# Patient Record
Sex: MALE | Race: BLACK OR AFRICAN AMERICAN | NOT HISPANIC OR LATINO | Employment: FULL TIME | URBAN - METROPOLITAN AREA
[De-identification: names, ages, dates, MRNs, and addresses within clinical notes are randomized per-mention and may not be internally consistent; named-entity substitution may affect disease eponyms.]

---

## 2014-11-20 LAB — CRC RECOMMENDATION EXT: NORMAL

## 2017-02-06 ENCOUNTER — OFFICE VISIT (OUTPATIENT)
Dept: OPHTHALMOLOGY | Facility: CLINIC | Age: 57
End: 2017-02-06
Payer: COMMERCIAL

## 2017-02-06 DIAGNOSIS — L02.01 FACIAL ABSCESS: ICD-10-CM

## 2017-02-06 PROCEDURE — 87077 CULTURE AEROBIC IDENTIFY: CPT

## 2017-02-06 PROCEDURE — 10060 I&D ABSCESS SIMPLE/SINGLE: CPT | Mod: S$GLB,,, | Performed by: OPHTHALMOLOGY

## 2017-02-06 PROCEDURE — 87070 CULTURE OTHR SPECIMN AEROBIC: CPT

## 2017-02-06 PROCEDURE — 87186 SC STD MICRODIL/AGAR DIL: CPT

## 2017-02-06 PROCEDURE — 92012 INTRM OPH EXAM EST PATIENT: CPT | Mod: 25,S$GLB,, | Performed by: OPHTHALMOLOGY

## 2017-02-06 PROCEDURE — 99999 PR PBB SHADOW E&M-EST. PATIENT-LVL I: CPT | Mod: PBBFAC,,, | Performed by: OPHTHALMOLOGY

## 2017-02-06 RX ORDER — SULFAMETHOXAZOLE AND TRIMETHOPRIM 800; 160 MG/1; MG/1
1 TABLET ORAL 2 TIMES DAILY
Qty: 20 TABLET | Refills: 2 | Status: SHIPPED | OUTPATIENT
Start: 2017-02-06 | End: 2017-02-16

## 2017-02-06 RX ORDER — RIFAMPIN 300 MG/1
300 CAPSULE ORAL EVERY 12 HOURS
Qty: 20 CAPSULE | Refills: 3 | Status: SHIPPED | OUTPATIENT
Start: 2017-02-06 | End: 2017-02-16

## 2017-02-06 NOTE — PROGRESS NOTES
"SUBJECTIVE:   Bradley Petty Jr. is a 56 y.o. male   Uncorrected distance visual acuity was 20/30 -1 in the right eye and 20/30 in the left eye.   Chief Complaint   Patient presents with    Eye Problem        HPI:  HPI     OD temporal area red, swollen,  since 2-4-17, had tried to pop a pimple.  Pain Scale 7    [facility]    1. COAG Suspect (?FHx vs suspect mother) variable IOP's 18-25  Borderline cups and GOCT- very long dscussion and pt opts to wait  and observe and do SLT if any changes  2. Myopic Astigmatism  ___________________________________________  Last visit 2/18/13 with Dr. Tobin, but canceled SLT  COAG  History of non compliance  /505 (+3)  Gonio OU normal  VF 10/9/12 OD bjerrum, OS "ok"  Tmax 25/26 post serial tonometry  Increased c:d .7/.8  +family history of glaucoma  Primary Sjogren's syndrome        Last edited by Nish Tobin MD on 2/6/2017  5:27 PM.     Assessment /Plan :  1. Facial abscess   PROCEDURE NOTE: INCISION OF Dacrocystitis Abscess     56 y.o.  symptomatic patient with non-resolving abscess of right malar eminence    PROCEDURE: After a thorough explanation of the risks, benefits and alternatives to  Incision and drainage facial abscess including scarring, pain, infection, change in vision, and likelihood of need for additional surgery with frequent recurrences the patient signed the consent. Ak-taine solution was placed in both eyes and EMLA applied to area. A 30 gauge needle on a Tb syringe was used to inject 5 cc of lidocaine with epi. An #11 blade was used to I and D the abscess with seropurulent discharge presenting and sample collect with a culturette. A curette was used to express the discharge along with digital compression. Pt tolerated the procedure well.    IMPRESSION: Incision and Drainage of Facial abscess  well tolerated    PLAN:  1. Continue oral antibiotics bactrim ds and rifampin  2. Warm compresses and antibiotic oint   3. RTC prn  4. " Culture sent

## 2017-02-09 LAB — BACTERIA SPEC AEROBE CULT: NORMAL

## 2017-02-20 ENCOUNTER — NURSE TRIAGE (OUTPATIENT)
Dept: ADMINISTRATIVE | Facility: CLINIC | Age: 57
End: 2017-02-20

## 2017-02-20 ENCOUNTER — TELEPHONE (OUTPATIENT)
Dept: OPHTHALMOLOGY | Facility: CLINIC | Age: 57
End: 2017-02-20

## 2017-02-20 NOTE — TELEPHONE ENCOUNTER
----- Message from Gema Bullard sent at 2/20/2017  7:18 AM CST -----  Contact: Patient  Patient called to speak with the nurse; he stated he was on antibiotics. He did take them all but he went and got the refill. He wants to know if he was supposed to take another round of the antibiotics. Please call him at [phone].    Thanks,  Gema

## 2017-02-20 NOTE — TELEPHONE ENCOUNTER
Called pt and told him Dr. Tobin said that when he feels he is done with it, take it for 3 more days.

## 2017-02-20 NOTE — TELEPHONE ENCOUNTER
Reason for Disposition   Caller has NON-URGENT medication question about med that PCP prescribed and triager unable to answer question    Protocols used: ST MEDICATION QUESTION CALL-A-AH    Bradley is calling to report he finished first round of antibiotics and did refill them.  Bradley is not sure if he was supposed to refill and continue to take them.  Also reports some nausea with antibiotics.  Please contact Bradley to advise.

## 2017-02-21 ENCOUNTER — OFFICE VISIT (OUTPATIENT)
Dept: INTERNAL MEDICINE | Facility: CLINIC | Age: 57
End: 2017-02-21
Payer: COMMERCIAL

## 2017-02-21 ENCOUNTER — HOSPITAL ENCOUNTER (OUTPATIENT)
Dept: RADIOLOGY | Facility: HOSPITAL | Age: 57
Discharge: HOME | End: 2017-02-21
Attending: INTERNAL MEDICINE
Payer: COMMERCIAL

## 2017-02-21 ENCOUNTER — TELEPHONE (OUTPATIENT)
Dept: INTERNAL MEDICINE | Facility: CLINIC | Age: 57
End: 2017-02-21

## 2017-02-21 VITALS
HEIGHT: 72 IN | DIASTOLIC BLOOD PRESSURE: 80 MMHG | HEART RATE: 97 BPM | WEIGHT: 217.63 LBS | BODY MASS INDEX: 29.48 KG/M2 | TEMPERATURE: 97 F | OXYGEN SATURATION: 98 % | SYSTOLIC BLOOD PRESSURE: 112 MMHG

## 2017-02-21 DIAGNOSIS — D70.9 SEVERE NEUTROPENIA: Primary | ICD-10-CM

## 2017-02-21 DIAGNOSIS — R53.83 FATIGUE, UNSPECIFIED TYPE: ICD-10-CM

## 2017-02-21 DIAGNOSIS — L02.01 FACIAL ABSCESS: ICD-10-CM

## 2017-02-21 DIAGNOSIS — R06.00 DYSPNEA, UNSPECIFIED TYPE: ICD-10-CM

## 2017-02-21 DIAGNOSIS — R11.0 NAUSEA: Primary | ICD-10-CM

## 2017-02-21 PROCEDURE — 99214 OFFICE O/P EST MOD 30 MIN: CPT | Mod: S$GLB,,, | Performed by: INTERNAL MEDICINE

## 2017-02-21 PROCEDURE — 71020 XR CHEST PA AND LATERAL: CPT | Mod: TC,PO

## 2017-02-21 PROCEDURE — 3074F SYST BP LT 130 MM HG: CPT | Mod: S$GLB,,, | Performed by: INTERNAL MEDICINE

## 2017-02-21 PROCEDURE — 3079F DIAST BP 80-89 MM HG: CPT | Mod: S$GLB,,, | Performed by: INTERNAL MEDICINE

## 2017-02-21 PROCEDURE — 1160F RVW MEDS BY RX/DR IN RCRD: CPT | Mod: S$GLB,,, | Performed by: INTERNAL MEDICINE

## 2017-02-21 PROCEDURE — 99999 PR PBB SHADOW E&M-EST. PATIENT-LVL IV: CPT | Mod: PBBFAC,,, | Performed by: INTERNAL MEDICINE

## 2017-02-21 PROCEDURE — 71020 XR CHEST PA AND LATERAL: CPT | Mod: 26,,, | Performed by: RADIOLOGY

## 2017-02-21 RX ORDER — ONDANSETRON 8 MG/1
8 TABLET, ORALLY DISINTEGRATING ORAL EVERY 8 HOURS
Qty: 30 TABLET | Refills: 0 | Status: SHIPPED | OUTPATIENT
Start: 2017-02-21 | End: 2018-06-26

## 2017-02-21 NOTE — MR AVS SNAPSHOT
[facility]  [address] [place] [address]  Phone: [phone]  Fax: [phone]                  Bradley Petty JrYue   2017 10:40 AM   Office Visit    Description:  Male : 1960   Provider:  Jesse Barnes MD   Department:  [facility] Medicine           Reason for Visit     Weakness     Chills           Diagnoses this Visit        Comments    Nausea    -  Primary     Fatigue, unspecified type         Dyspnea, unspecified type         Facial abscess                To Do List           Future Appointments        Provider Department Dept Phone    2017 12:00 PM LAB, SAME DAY [facility] [phone]    2017 12:30 PM [facility] XR2 [facility] [phone]    2017 11:00 AM Jesse Barnes MD [facility] [phone]      Goals (5 Years of Data)     None      Follow-Up and Disposition     Return in about 3 months (around 2017), or if symptoms worsen or fail to improve.       These Medications        Disp Refills Start End    ondansetron (ZOFRAN-ODT) 8 MG TbDL 30 tablet 0 2017     Take 1 tablet (8 mg total) by mouth every 8 (eight) hours. Prn n/v - Oral    Pharmacy: [facility]/pharmacy #[id number] - [place] - [address] Ph #: [phone]         [facility]     [facility] On [facility] Nurse Care Line -  Assistance  Registered nurses in the [facility] Center provide clinical advisement, health education, appointment booking, and other advisory services.  Call for this free service at [phone].             Medications           Message regarding Medications     Verify the changes and/or additions to your medication regime listed below are the same as discussed with your clinician today.  If any of these changes or additions are incorrect, please notify your healthcare provider.        START taking these NEW medications        Refills     ondansetron (ZOFRAN-ODT) 8 MG TbDL 0    Sig: Take 1 tablet (8 mg total) by mouth every 8 (eight) hours. Prn n/v    Class: Normal    Route: Oral      STOP taking these medications     doxycycline (MONODOX) 100 MG capsule TAKE 1 CAPSULE ONCE DAILY WITH FOOD, MAY CAUSE UPSET STOMACH           Verify that the below list of medications is an accurate representation of the medications you are currently taking.  If none reported, the list may be blank. If incorrect, please contact your healthcare provider. Carry this list with you in case of emergency.           Current Medications     acetaminophen (TYLENOL) 500 mg Cap Take 1,000 mg by mouth once daily.    adapalene-benzoyl peroxide (EPIDUO) 0.1-2.5 % topical gel Apply topically.    b complex vitamins (VITAMIN B COMPLEX) tablet Take 1 tablet by mouth Daily.    benzoyl peroxide (BENZAC W WASH) 10 % external wash Use once daily after shaving.    cholecalciferol, vitamin D3, 5,000 unit capsule Take 5,000 Units by mouth Daily.    CIALIS 20 mg Tab TAKE 1 TABLET BY MOUTH ONCE DAILY AS NEEDED    clobetasol 0.05% (TEMOVATE) 0.05 % Oint AAA hands bid prn    fish oil-omega-3 fatty acids 300-1,000 mg capsule Take 1 g by mouth once daily.    fluticasone (FLONASE) 50 mcg/actuation nasal spray 1 spray by Nasal route once daily.     HERBAL DRUGS (COLON HERBAL CLEANSER ORAL) Take by mouth once daily.     hydroquinone, bulk, 98 % Powd Please compound Hydroquinone 8% cream. Apply to affected area twice a day x 3 months then skip a month    multivitamin capsule Take 1 capsule by mouth Daily.    MYRBETRIQ 25 mg Tb24 25 mg once daily.     ondansetron (ZOFRAN-ODT) 8 MG TbDL Take 1 tablet (8 mg total) by mouth every 8 (eight) hours. Prn n/v    pantoprazole (PROTONIX) 40 MG tablet Take 40 mg by mouth.     vitamin E 1000 UNIT capsule Take 1,000 Units by mouth once daily.           Clinical Reference Information           Your Vitals Were     BP Pulse Temp Height Weight SpO2    112/80 (BP  Location: Right arm, Patient Position: Sitting) 97 97.2 °F (36.2 °C) (Tympanic) 6' (1.829 m) 98.7 kg (217 lb 9.5 oz) 98%    BMI                29.51 kg/m2          Blood Pressure          Most Recent Value    BP  112/80      Allergies as of 2/21/2017     No Known Drug Allergies      Immunizations Administered on Date of Encounter - 2/21/2017     None      Orders Placed During Today's Visit     Future Labs/Procedures Expected by Expires    CBC auto differential  2/21/2017 4/22/2018    Comprehensive metabolic panel  2/21/2017 2/21/2018    HIV-1 and HIV-2 antibodies  2/21/2017 4/22/2018    TSH  2/21/2017 4/22/2018    X-Ray Chest PA And Lateral  2/21/2017 2/21/2018      Language Assistance Services     ATTENTION: Language assistance services are available, free of charge. Please call [phone].      ATENCIÓN: Si habla español, tiene a sanders disposición servicios gratuitos de asistencia lingüística. Llame al [phone].     CHÚ Ý: N?u b?n nói Ti?ng Vi?t, có các d?ch v? h? tr? ngôn ng? mi?n phí dành cho b?n. G?i s? [phone].         [facility] - Internal Medicine complies with applicable Federal civil rights laws and does not discriminate on the basis of race, color, national origin, age, disability, or sex.

## 2017-02-22 ENCOUNTER — INITIAL CONSULT (OUTPATIENT)
Dept: HEMATOLOGY/ONCOLOGY | Facility: CLINIC | Age: 57
End: 2017-02-22
Payer: COMMERCIAL

## 2017-02-22 VITALS
HEART RATE: 100 BPM | OXYGEN SATURATION: 98 % | WEIGHT: 215.63 LBS | BODY MASS INDEX: 29.21 KG/M2 | SYSTOLIC BLOOD PRESSURE: 102 MMHG | HEIGHT: 72 IN | DIASTOLIC BLOOD PRESSURE: 70 MMHG | TEMPERATURE: 99 F

## 2017-02-22 DIAGNOSIS — D72.819 LEUKOCYTOPENIA, UNSPECIFIED: Primary | ICD-10-CM

## 2017-02-22 PROCEDURE — 99244 OFF/OP CNSLTJ NEW/EST MOD 40: CPT | Mod: S$GLB,,, | Performed by: INTERNAL MEDICINE

## 2017-02-22 PROCEDURE — 99999 PR PBB SHADOW E&M-EST. PATIENT-LVL III: CPT | Mod: PBBFAC,,, | Performed by: INTERNAL MEDICINE

## 2017-02-22 NOTE — TELEPHONE ENCOUNTER
I spoke to patient at approximately 7:20 PM on 2/21/17..  Explained finding of severe neutropenia to patient and potential for recent antibiotics to be the culprit.  Patient reported that he is feeling okay and thinks he is getting better.  Nausea medicine helped.  He again denied any fever, diarrhea, pain.  I explained to him that if he has any worsening of condition or any rising temperature above 99.5 or 100.0 he should present to the emergency room immediately for further evaluation.  Please contact the patient this morning with a hematology appointment for February 22, 2017.  Referral has been placed.  Please let me know if you have any difficulty scheduling same day appointment for patient.

## 2017-02-22 NOTE — TELEPHONE ENCOUNTER
Spoke to Dr. Frey's nurse, Gianna, she stated pt can be worked in today at 1 pm. Notified pt that Dr. Frey has an opening today at 1 pm and he is on the 3rd floor at [facility]. Pt verbalized understanding and stated he will be there.

## 2017-02-22 NOTE — LETTER
February 24, 2017      Jesse Barnes MD  [address] [place] [address]           [facility]ot[facility]  [address] [place] [address]  Phone: [phone]  Fax: [phone]          Patient: Bradley Petty Jr.   MR Number: [id number]   YOB: 1960   Date of Visit: 2/22/2017       Dear Dr. Jesse Barnes:    Thank you for referring Bradely Petty to me for evaluation. Attached you will find relevant portions of my assessment and plan of care.    If you have questions, please do not hesitate to call me. I look forward to following Bradley Petty along with you.    Sincerely,    Myron Frey MD    Enclosure  CC:  No Recipients    If you would like to receive this communication electronically, please contact [email or web address] or [phone] to request more information on [employer] Link access.    For providers and/or their staff who would like to refer a patient to [facility], please contact us through our one-stop-shop provider referral line, [facility], at [phone].    If you feel you have received this communication in error or would no longer like to receive these types of communications, please e-mail [email or web address]

## 2017-02-24 NOTE — PROGRESS NOTES
Provider requesting consultation: Dr. Jesse Barnes with internal medicine  Reason for Consult: Leukopenia    HPI:   The patient is a 56-year-old  male who presents to the hematology oncology clinic today in consultation to discuss further evaluation and management recommendations for leukopenia.  The patient is been referred to me by Dr. Jesse Barnes with internal medicine.  I have reviewed all of the patient's clinical history available in the medical record and have utilized this in my evaluation and management recommendations today.  Today the patient reports that he has recently recovered from a right facial abscess for which he underwent I&D by Dr. Tobin.  He reports that he was treated with Bactrim DS and rifampin for this.  This was stopped just a few days ago.  Today the patient reports that overall he feels that his energy levels are slowly improving.  He had been having a lot of fatigue when he had his infection.  He denies any chest pain or shortness of breath.  He denies any melena, hematochezia, hemoptysis, hemoptysis or hematuria.  He denies any prior history of recurrent infections or hospitalizations for IV antibiotics to treat infections.  He denies any nausea, vomiting or abdominal pain.    PAST MEDICAL HISTORY:   1.  MRSA facial abscess  2.  Hypertension  3.  Dyslipidemia  4.  GERD    SURGICAL HISTORY:   1.  Back surgery    FAMILY HISTORY: He reports that his maternal grandmother had cancer.  He denies any other immediate family members with cancer or bleeding/clotting disorders.    SOCIAL HISTORY: He denies smoking cigarettes.  He does not think alcohol.  He has never used any recreational drugs.  He works as a  at [employer].  He lives in [place].    ALLERGIES: [NKDA]    MEDICATIONS: [Medcard has been reviewed and/or reconciled.]    REVIEW OF SYSTEMS:   GENERAL: [No fevers, chills or sweats. Reports fatigue. Denies weight loss or loss of  appetite.]  HEENT: [No blurred vision, tinnitus, nasal discharge, sorethroat or dysphagia.]  HEART: [No chest pain, palpitations or shortness of breath.]    LUNGS: [No cough, hemoptysis or breathing problems.]  ABDOMEN: [No abdominal pain, nausea, vomiting, diarrhea, constipation or melena.]  GENITOURINARY: [No dysuria, bleeding or malodorous discharge.]  NEURO: [No headache, dizziness or vertigo.]  HEMATOLOGY: [No easy bruising, spontaneous bleeding or blood clots in the past].  MUSCULOSKELETAL: [No arthralgias, myalgias or bone pains.]  SKIN: [No rashes or skin lesions.]  PSYCHIATRY: [No depression or anxiety.]    PHYSICAL EXAMINATION:   VS: Reviewed on nurse's notes.  APPEARANCE: The patient is a well-developed, well-nourished and well-groomed -American male who appears in no acute distress.  He was accompanied to this clinic visit by his mother.  HEENT: No scleral icterus. Both external auditory canals clear. No oral ulcers, lesions. Throat clear  HEAD: No sinus tenderness.  NECK: Supple. No palpable lymphadenopathy. Thyroid non-tender, no palpable masses  CHEST: Breath sounds clear bilaterally. No rales. No rhonchi. Unlabored respirations.  CARDIOVASCULAR: Normal S1, S2. Normal rate. Regular rhythm.  ABDOMEN: Bowel sounds normal. No tenderness. No abdominal distention. No hepatomegaly. No splenomegaly.  LYMPHATIC: No palpable supraclavicular, axillary nodes  EXTREMITIES: No clubbing, cyanosis, edema  SKIN: No lesions. No petechiae. No ecchymoses. No induration or nodules  NEUROLOGIC: No focal findings. Alert & Oriented x 3. Mood appropriate to affect    LABS:   Reviewed    IMAGING:  Reviewed    IMPRESSION:  1.  Acute leukopenia    PLAN:  1.  I had a detailed discussion with the patient today with regard to his current clinical situation.  Specifically we discussed about the differential diagnosis for his acute leukopenia.  In the patient's case the most likely etiology appears to be his recent treatment  with Bactrim/rifampin for his MRSA facial abscess/cellulitis.  I have discussed with the patient that I would expect this to slowly resolve over the next 1-2 weeks as he has completed/stopped taking these antibiotics at this time with resolution of his infection.  We did discuss that the differential diagnoses included benign versus malignant causes.  2.  I have discussed with him that I will go ahead and perform a bone marrow aspiration and biopsy if his WBC count does not return to baseline as expected in the next 1-2 weeks.  The patient will follow-up with me in about 10 days with repeat labs and I will proceed with additional evaluation as needed based on the results.  3.  Neutropenic precautions were discussed in detail and he expressed understanding.  He knows to seek immediate medical attention for any signs/symptoms of infection.    Follow-up as above.  He knows to call sooner as above if any new problems or questions.    Myron Frey MD

## 2017-03-02 ENCOUNTER — LAB VISIT (OUTPATIENT)
Dept: LAB | Facility: HOSPITAL | Age: 57
End: 2017-03-02
Attending: INTERNAL MEDICINE
Payer: COMMERCIAL

## 2017-03-02 ENCOUNTER — OFFICE VISIT (OUTPATIENT)
Dept: HEMATOLOGY/ONCOLOGY | Facility: CLINIC | Age: 57
End: 2017-03-02
Payer: COMMERCIAL

## 2017-03-02 VITALS
BODY MASS INDEX: 29.44 KG/M2 | SYSTOLIC BLOOD PRESSURE: 124 MMHG | WEIGHT: 217.38 LBS | OXYGEN SATURATION: 98 % | DIASTOLIC BLOOD PRESSURE: 80 MMHG | TEMPERATURE: 98 F | HEIGHT: 72 IN | HEART RATE: 80 BPM

## 2017-03-02 DIAGNOSIS — Z22.322 MRSA COLONIZATION: ICD-10-CM

## 2017-03-02 DIAGNOSIS — D72.819 LEUKOPENIA, UNSPECIFIED TYPE: Primary | ICD-10-CM

## 2017-03-02 DIAGNOSIS — D72.819 LEUKOCYTOPENIA, UNSPECIFIED: ICD-10-CM

## 2017-03-02 LAB
ALBUMIN SERPL BCP-MCNC: 3.4 G/DL
ALP SERPL-CCNC: 75 U/L
ALT SERPL W/O P-5'-P-CCNC: 39 U/L
ANION GAP SERPL CALC-SCNC: 6 MMOL/L
AST SERPL-CCNC: 29 U/L
BASOPHILS # BLD AUTO: 0.02 K/UL
BASOPHILS NFR BLD: 0.5 %
BILIRUB SERPL-MCNC: 0.3 MG/DL
BUN SERPL-MCNC: 18 MG/DL
CALCIUM SERPL-MCNC: 8.8 MG/DL
CHLORIDE SERPL-SCNC: 106 MMOL/L
CO2 SERPL-SCNC: 27 MMOL/L
CREAT SERPL-MCNC: 1.3 MG/DL
DIFFERENTIAL METHOD: ABNORMAL
EOSINOPHIL # BLD AUTO: 0.1 K/UL
EOSINOPHIL NFR BLD: 2.1 %
ERYTHROCYTE [DISTWIDTH] IN BLOOD BY AUTOMATED COUNT: 12.5 %
EST. GFR  (AFRICAN AMERICAN): >60 ML/MIN/1.73 M^2
EST. GFR  (NON AFRICAN AMERICAN): >60 ML/MIN/1.73 M^2
GLUCOSE SERPL-MCNC: 97 MG/DL
HCT VFR BLD AUTO: 40.6 %
HGB BLD-MCNC: 13.3 G/DL
LYMPHOCYTES # BLD AUTO: 2 K/UL
LYMPHOCYTES NFR BLD: 52.6 %
MCH RBC QN AUTO: 30 PG
MCHC RBC AUTO-ENTMCNC: 32.8 %
MCV RBC AUTO: 91 FL
MONOCYTES # BLD AUTO: 0.6 K/UL
MONOCYTES NFR BLD: 15.9 %
NEUTROPHILS # BLD AUTO: 1.1 K/UL
NEUTROPHILS NFR BLD: 28.9 %
PLATELET # BLD AUTO: 321 K/UL
PMV BLD AUTO: 8.3 FL
POTASSIUM SERPL-SCNC: 4.5 MMOL/L
PROT SERPL-MCNC: 7.4 G/DL
RBC # BLD AUTO: 4.44 M/UL
SODIUM SERPL-SCNC: 139 MMOL/L
WBC # BLD AUTO: 3.84 K/UL

## 2017-03-02 PROCEDURE — 3079F DIAST BP 80-89 MM HG: CPT | Mod: S$GLB,,, | Performed by: INTERNAL MEDICINE

## 2017-03-02 PROCEDURE — 1160F RVW MEDS BY RX/DR IN RCRD: CPT | Mod: S$GLB,,, | Performed by: INTERNAL MEDICINE

## 2017-03-02 PROCEDURE — 3074F SYST BP LT 130 MM HG: CPT | Mod: S$GLB,,, | Performed by: INTERNAL MEDICINE

## 2017-03-02 PROCEDURE — 80053 COMPREHEN METABOLIC PANEL: CPT

## 2017-03-02 PROCEDURE — 36415 COLL VENOUS BLD VENIPUNCTURE: CPT

## 2017-03-02 PROCEDURE — 99214 OFFICE O/P EST MOD 30 MIN: CPT | Mod: S$GLB,,, | Performed by: INTERNAL MEDICINE

## 2017-03-02 PROCEDURE — 99999 PR PBB SHADOW E&M-EST. PATIENT-LVL III: CPT | Mod: PBBFAC,,, | Performed by: INTERNAL MEDICINE

## 2017-03-02 PROCEDURE — 85025 COMPLETE CBC W/AUTO DIFF WBC: CPT

## 2017-03-02 RX ORDER — CHLORHEXIDINE GLUCONATE 40 MG/ML
SOLUTION TOPICAL DAILY PRN
Qty: 946 ML | Refills: 0 | Status: SHIPPED | OUTPATIENT
Start: 2017-03-02 | End: 2018-02-08

## 2017-03-02 NOTE — PROGRESS NOTES
Reason for visit: Leukopenia    HPI:   The patient is a 56-year-old  male who presents to the hematology oncology clinic today to discuss further evaluation and management recommendations for leukopenia.  I have reviewed all of the patient's clinical history available in the medical record and have utilized this in my evaluation and management recommendations today.  Today the patient reports that he has recently recovered from a right facial abscess for which he underwent I&D by Dr. Tobin.  He reports that he was treated with Bactrim DS and rifampin for this.  This was stopped after leukopenia was noted.   Today the patient reports that overall he feels that his energy levels are back to normal.  He denies any chest pain or shortness of breath.  He denies any melena, hematochezia, hemoptysis, hemoptysis or hematuria.  He denies any prior history of recurrent infections or hospitalizations for IV antibiotics to treat infections.  He denies any nausea, vomiting or abdominal pain.    PAST MEDICAL HISTORY:   1.  MRSA facial abscess  2.  Hypertension  3.  Dyslipidemia  4.  GERD    SURGICAL HISTORY:   1.  Back surgery    FAMILY HISTORY: He reports that his maternal grandmother had cancer.  He denies any other immediate family members with cancer or bleeding/clotting disorders.    SOCIAL HISTORY: He denies smoking cigarettes.  He does not think alcohol.  He has never used any recreational drugs.  He works as a  at [employer].  He lives in [place].    ALLERGIES: [NKDA]    MEDICATIONS: [Medcard has been reviewed and/or reconciled.]    REVIEW OF SYSTEMS:   GENERAL: [No fevers, chills or sweats. Reports fatigue. Denies weight loss or loss of appetite.]  HEENT: [No blurred vision, tinnitus, nasal discharge, sorethroat or dysphagia.]  HEART: [No chest pain, palpitations or shortness of breath.]    LUNGS: [No cough, hemoptysis or breathing problems.]  ABDOMEN: [No abdominal  pain, nausea, vomiting, diarrhea, constipation or melena.]  GENITOURINARY: [No dysuria, bleeding or malodorous discharge.]  NEURO: [No headache, dizziness or vertigo.]  HEMATOLOGY: [No easy bruising, spontaneous bleeding or blood clots in the past].  MUSCULOSKELETAL: [No arthralgias, myalgias or bone pains.]  SKIN: [No rashes or skin lesions.]  PSYCHIATRY: [No depression or anxiety.]    PHYSICAL EXAMINATION:   VS: Reviewed on nurse's notes.  APPEARANCE: The patient is a well-developed, well-nourished and well-groomed -American male who appears in no acute distress.  He was accompanied to this clinic visit by his mother.  HEENT: No scleral icterus. Both external auditory canals clear. No oral ulcers, lesions. Throat clear  HEAD: No sinus tenderness.  NECK: Supple. No palpable lymphadenopathy. Thyroid non-tender, no palpable masses  CHEST: Breath sounds clear bilaterally. No rales. No rhonchi. Unlabored respirations.  CARDIOVASCULAR: Normal S1, S2. Normal rate. Regular rhythm.  ABDOMEN: Bowel sounds normal. No tenderness. No abdominal distention. No hepatomegaly. No splenomegaly.  LYMPHATIC: No palpable supraclavicular, axillary nodes  EXTREMITIES: No clubbing, cyanosis, edema  SKIN: No lesions. No petechiae. No ecchymoses. No induration or nodules  NEUROLOGIC: No focal findings. Alert & Oriented x 3. Mood appropriate to affect    LABS:   Reviewed    IMAGING:  Reviewed    IMPRESSION:  1.  Acute leukopenia  2.  MRSA colonization    PLAN:  1.  I had a detailed discussion with the patient today with regard to his current clinical situation.  CBC from today shows return to baseline values for all his blood count. He does have relative lymphocytosis which appears to be chronic.  2.  I have discussed with him that I will go ahead and perform a bone marrow aspiration and biopsy in the near future if indicated based on continued follow up of his blood counts.  3.  Neutropenic precautions were discussed in detail and he  expressed understanding.  He knows to seek immediate medical attention for any signs/symptoms of infection.    Follow-up in 1 month with CBC.  He knows to call sooner as above if any new problems or questions.    Myron Frey MD

## 2017-03-02 NOTE — LETTER
March 2, 2017      Jesse Barnes MD  [address] [address]           [facility]  [address] [place] [address]  Phone: [phone]  Fax: [phone]          Patient: Bradley Petty Jr.   MR Number: [id number]   YOB: 1960   Date of Visit: 3/2/2017       Dear Dr. Jesse Barnes:    Thank you for referring Bradley Petty to me for evaluation. Attached you will find relevant portions of my assessment and plan of care.    If you have questions, please do not hesitate to call me. I look forward to following Bradley Petty along with you.    Sincerely,    Myron Frey MD    Enclosure  CC:  No Recipients    If you would like to receive this communication electronically, please contact [email or web address] or [phone] to request more information on [employer] Link access.    For providers and/or their staff who would like to refer a patient to [facility], please contact us through our one-stop-shop provider referral line, [facility], at [phone].    If you feel you have received this communication in error or would no longer like to receive these types of communications, please e-mail [email or web address]

## 2017-07-06 ENCOUNTER — LAB VISIT (OUTPATIENT)
Dept: LAB | Facility: HOSPITAL | Age: 57
End: 2017-07-06
Attending: INTERNAL MEDICINE
Payer: COMMERCIAL

## 2017-07-06 ENCOUNTER — OFFICE VISIT (OUTPATIENT)
Dept: INTERNAL MEDICINE | Facility: CLINIC | Age: 57
End: 2017-07-06
Payer: COMMERCIAL

## 2017-07-06 VITALS
HEIGHT: 72 IN | HEART RATE: 84 BPM | DIASTOLIC BLOOD PRESSURE: 90 MMHG | OXYGEN SATURATION: 98 % | WEIGHT: 233 LBS | BODY MASS INDEX: 31.56 KG/M2 | SYSTOLIC BLOOD PRESSURE: 130 MMHG | TEMPERATURE: 97 F

## 2017-07-06 DIAGNOSIS — N40.0 BENIGN PROSTATIC HYPERPLASIA, PRESENCE OF LOWER URINARY TRACT SYMPTOMS UNSPECIFIED, UNSPECIFIED MORPHOLOGY: ICD-10-CM

## 2017-07-06 DIAGNOSIS — N52.9 ERECTILE DYSFUNCTION, UNSPECIFIED ERECTILE DYSFUNCTION TYPE: ICD-10-CM

## 2017-07-06 DIAGNOSIS — G47.33 OSA (OBSTRUCTIVE SLEEP APNEA): ICD-10-CM

## 2017-07-06 DIAGNOSIS — Z00.00 ROUTINE GENERAL MEDICAL EXAMINATION AT A HEALTH CARE FACILITY: ICD-10-CM

## 2017-07-06 DIAGNOSIS — M35.00 PRIMARY SJOGREN'S SYNDROME: Chronic | ICD-10-CM

## 2017-07-06 DIAGNOSIS — Z00.00 ROUTINE GENERAL MEDICAL EXAMINATION AT A HEALTH CARE FACILITY: Primary | ICD-10-CM

## 2017-07-06 DIAGNOSIS — I10 ESSENTIAL HYPERTENSION: ICD-10-CM

## 2017-07-06 PROBLEM — L02.01 FACIAL ABSCESS: Status: RESOLVED | Noted: 2017-02-06 | Resolved: 2017-07-06

## 2017-07-06 LAB
25(OH)D3+25(OH)D2 SERPL-MCNC: 59 NG/ML
ALBUMIN SERPL BCP-MCNC: 3.7 G/DL
ALP SERPL-CCNC: 67 U/L
ALT SERPL W/O P-5'-P-CCNC: 19 U/L
ANION GAP SERPL CALC-SCNC: 7 MMOL/L
AST SERPL-CCNC: 23 U/L
BASOPHILS # BLD AUTO: 0.02 K/UL
BASOPHILS NFR BLD: 0.5 %
BILIRUB SERPL-MCNC: 0.5 MG/DL
BUN SERPL-MCNC: 18 MG/DL
CALCIUM SERPL-MCNC: 9.3 MG/DL
CHLORIDE SERPL-SCNC: 105 MMOL/L
CHOLEST/HDLC SERPL: 3.7 {RATIO}
CO2 SERPL-SCNC: 29 MMOL/L
COMPLEXED PSA SERPL-MCNC: 2.1 NG/ML
CREAT SERPL-MCNC: 1.3 MG/DL
DIFFERENTIAL METHOD: ABNORMAL
EOSINOPHIL # BLD AUTO: 0.1 K/UL
EOSINOPHIL NFR BLD: 1.8 %
ERYTHROCYTE [DISTWIDTH] IN BLOOD BY AUTOMATED COUNT: 12.8 %
EST. GFR  (AFRICAN AMERICAN): >60 ML/MIN/1.73 M^2
EST. GFR  (NON AFRICAN AMERICAN): >60 ML/MIN/1.73 M^2
GLUCOSE SERPL-MCNC: 94 MG/DL
HCT VFR BLD AUTO: 42.2 %
HDL/CHOLESTEROL RATIO: 26.7 %
HDLC SERPL-MCNC: 217 MG/DL
HDLC SERPL-MCNC: 58 MG/DL
HGB BLD-MCNC: 13.9 G/DL
LDLC SERPL CALC-MCNC: 146.8 MG/DL
LYMPHOCYTES # BLD AUTO: 1.4 K/UL
LYMPHOCYTES NFR BLD: 38 %
MCH RBC QN AUTO: 30.6 PG
MCHC RBC AUTO-ENTMCNC: 32.9 %
MCV RBC AUTO: 93 FL
MONOCYTES # BLD AUTO: 0.3 K/UL
MONOCYTES NFR BLD: 6.9 %
NEUTROPHILS # BLD AUTO: 2 K/UL
NEUTROPHILS NFR BLD: 52.8 %
NONHDLC SERPL-MCNC: 159 MG/DL
PLATELET # BLD AUTO: 212 K/UL
PMV BLD AUTO: 9.7 FL
POTASSIUM SERPL-SCNC: 4.3 MMOL/L
PROT SERPL-MCNC: 7.8 G/DL
RBC # BLD AUTO: 4.54 M/UL
SODIUM SERPL-SCNC: 141 MMOL/L
TRIGL SERPL-MCNC: 61 MG/DL
TSH SERPL DL<=0.005 MIU/L-ACNC: 2.52 UIU/ML
WBC # BLD AUTO: 3.79 K/UL

## 2017-07-06 PROCEDURE — 84443 ASSAY THYROID STIM HORMONE: CPT

## 2017-07-06 PROCEDURE — 85025 COMPLETE CBC W/AUTO DIFF WBC: CPT

## 2017-07-06 PROCEDURE — 99999 PR PBB SHADOW E&M-EST. PATIENT-LVL IV: CPT | Mod: PBBFAC,,, | Performed by: INTERNAL MEDICINE

## 2017-07-06 PROCEDURE — 80061 LIPID PANEL: CPT

## 2017-07-06 PROCEDURE — 80053 COMPREHEN METABOLIC PANEL: CPT

## 2017-07-06 PROCEDURE — 82306 VITAMIN D 25 HYDROXY: CPT

## 2017-07-06 PROCEDURE — 84153 ASSAY OF PSA TOTAL: CPT

## 2017-07-06 PROCEDURE — 99396 PREV VISIT EST AGE 40-64: CPT | Mod: S$GLB,,, | Performed by: INTERNAL MEDICINE

## 2017-07-06 PROCEDURE — 83036 HEMOGLOBIN GLYCOSYLATED A1C: CPT

## 2017-07-06 PROCEDURE — 36415 COLL VENOUS BLD VENIPUNCTURE: CPT | Mod: PO

## 2017-07-06 RX ORDER — TAMSULOSIN HYDROCHLORIDE 0.4 MG/1
0.4 CAPSULE ORAL DAILY
Qty: 30 CAPSULE | Refills: 0 | Status: SHIPPED | OUTPATIENT
Start: 2017-07-06 | End: 2017-08-10 | Stop reason: SDUPTHER

## 2017-07-06 NOTE — PROGRESS NOTES
Subjective:      Patient ID: Bradley Petty Jr. is a 56 y.o. male.    Chief Complaint: Follow-up    57 yo with Patient Active Problem List:     Leukocytopenia, unspecified     Lumbago     Unspecified polyarthropathy or polyarthritis, site unspecified     Primary Sjogren's syndrome     Fibromyalgia     Depressed     HTN (hypertension)     Pain in left shoulder     Cervical radiculitis     Primary open-angle glaucoma     Family history of glaucoma     Refractive error    Here today for annual exam. Never smoked. Pt reports nocturia x 6 -7 x years.  Has some urgency. Decreased stream. Rare interruption of stream. Has seen urology told he had bph. and placed on myrbetriq and flomax.  Has not taken in years. Thinks may have been too expensive. Also interested in .  He reports diagnosis with sleep apnea years ago.  Improved with weight loss but still gets tired in the afternoons.  Admits to snoring.  Also reports erectile dysfunction for years.  No significant worsening.  Cialis worked in the past but was too expensive.  Denies decreased libido.      Review of Systems   Constitutional: Negative for chills and fever.   HENT: Negative for ear pain and sore throat.    Respiratory: Negative for cough.    Cardiovascular: Negative for chest pain.   Gastrointestinal: Negative for abdominal pain and blood in stool.   Genitourinary: Negative for dysuria and hematuria.   Neurological: Negative for seizures and syncope.     Objective:   BP (!) 130/90 (BP Location: Right arm, Patient Position: Sitting)   Pulse 84   Temp 96.5 °F (35.8 °C) (Tympanic)   Ht 6' (1.829 m)   Wt 105.7 kg (233 lb 0.4 oz)   SpO2 98%   BMI 31.60 kg/m²     Physical Exam   Constitutional: He is oriented to person, place, and time. He appears well-developed and well-nourished. No distress.   HENT:   Head: Normocephalic and atraumatic.   Mouth/Throat: Oropharynx is clear and moist.   Eyes: EOM are normal. Pupils are equal, round, and reactive to light.    Neck: Neck supple. No thyromegaly present.   Cardiovascular: Normal rate and regular rhythm.    Pulmonary/Chest: Breath sounds normal. He has no wheezes. He has no rales.   Abdominal: Soft. Bowel sounds are normal. There is no tenderness.   Musculoskeletal: He exhibits no edema.   Lymphadenopathy:     He has no cervical adenopathy.   Neurological: He is alert and oriented to person, place, and time.   Skin: Skin is warm and dry.   Psychiatric: He has a normal mood and affect. His behavior is normal.       Assessment:     1. Routine general medical examination at a health care facility    2. Primary Sjogren's syndrome    3. Essential hypertension    4. Benign prostatic hyperplasia, presence of lower urinary tract symptoms unspecified, unspecified morphology    5. DA (obstructive sleep apnea)    6. Erectile dysfunction, unspecified erectile dysfunction type      Plan:   Routine general medical examination at a health care facility  -     CBC auto differential; Future; Expected date: 07/06/2017  -     Comprehensive metabolic panel; Future; Expected date: 07/06/2017  -     Hemoglobin A1c; Future; Expected date: 07/06/2017  -     TSH; Future; Expected date: 07/06/2017  -     PSA, Screening; Future; Expected date: 07/06/2017  -     Lipid panel; Future; Expected date: 07/06/2017  -     Vitamin D; Future; Expected date: 07/06/2017    Primary Sjogren's syndrome  Comments:  Asymptomatic    Essential hypertension  Comments:  Improved after weight loss, consider medications if continue elevated    Benign prostatic hyperplasia, presence of lower urinary tract symptoms unspecified, unspecified morphology  Comments:  notify me of response to flomax after one month.   Orders:  -     tamsulosin (FLOMAX) 0.4 mg Cp24; Take 1 capsule (0.4 mg total) by mouth once daily. Every evening  Dispense: 30 capsule; Refill: 0    DA (obstructive sleep apnea)  -     Ambulatory referral to Pulmonology    Erectile dysfunction, unspecified  erectile dysfunction type    Check with your insurance company regarding cost of cialis, viagra, levitra, staxyn, stendra      Need bp check nurse visit in 2-3weeks.  Needs MD appt if BP over 140/90.      Return if symptoms worsen or fail to improve.

## 2017-07-07 LAB
ESTIMATED AVG GLUCOSE: 111 MG/DL
HBA1C MFR BLD HPLC: 5.5 %

## 2017-07-20 ENCOUNTER — OFFICE VISIT (OUTPATIENT)
Dept: PULMONOLOGY | Facility: CLINIC | Age: 57
End: 2017-07-20
Payer: COMMERCIAL

## 2017-07-20 ENCOUNTER — CLINICAL SUPPORT (OUTPATIENT)
Dept: INTERNAL MEDICINE | Facility: CLINIC | Age: 57
End: 2017-07-20
Payer: COMMERCIAL

## 2017-07-20 VITALS
HEIGHT: 72 IN | RESPIRATION RATE: 20 BRPM | SYSTOLIC BLOOD PRESSURE: 120 MMHG | DIASTOLIC BLOOD PRESSURE: 78 MMHG | HEART RATE: 94 BPM | BODY MASS INDEX: 31.3 KG/M2 | OXYGEN SATURATION: 98 % | WEIGHT: 231.06 LBS

## 2017-07-20 VITALS — SYSTOLIC BLOOD PRESSURE: 122 MMHG | HEART RATE: 84 BPM | DIASTOLIC BLOOD PRESSURE: 84 MMHG

## 2017-07-20 DIAGNOSIS — E66.9 OBESITY (BMI 30-39.9): Primary | ICD-10-CM

## 2017-07-20 DIAGNOSIS — G47.33 OBSTRUCTIVE SLEEP APNEA SYNDROME: ICD-10-CM

## 2017-07-20 PROCEDURE — 99999 PR PBB SHADOW E&M-EST. PATIENT-LVL IV: CPT | Mod: PBBFAC,,, | Performed by: NURSE PRACTITIONER

## 2017-07-20 PROCEDURE — 99204 OFFICE O/P NEW MOD 45 MIN: CPT | Mod: S$GLB,,, | Performed by: NURSE PRACTITIONER

## 2017-07-20 PROCEDURE — 99999 PR PBB SHADOW E&M-EST. PATIENT-LVL III: CPT | Mod: PBBFAC,,,
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
## 2017-07-20 NOTE — PATIENT INSTRUCTIONS
Monitoring Your Sleep: Home Sleep Study  A home sleep study tracks and records body functions while youre asleep in your own bed. The results of the study will help diagnose your sleep problem and plan your treatment.    How a home sleep study works  During a sleep study, sensors attached to your body measure your breathing, oxygen level, and other body functions. You will be shown how to attach the sensors to your body. You may also have help from a technician. At bedtime you plug the sensors into a small computer and turn it on. In the morning, you will remove the sensors and return the computer so the results can be studied.  Tips  Youll be given instructions for how to set up the sensors and the computer. Doing so will be simple. For best results:  · Go through the instructions during the day so youll be ready to use the equipment at bedtime.  · Stick to your normal routine. Ask your healthcare provider if you should do anything differently the night of the study.  · If you get up during the night, reconnect the sensors to the computer or to yourself correctly.  · Get as many hours of sleep as you can.  Getting the results  The results of your sleep study need to be scored and interpreted. Once this is done, your healthcare provider will discuss the findings with you. The sleep study results will show whether you have apnea. This is when your breathing stops temporarily many times during the night, awakening you briefly.  It can also tell how severe the apnea is. The findings help your healthcare provider know which treatment or treatments may be the right ones for you.  Date Last Reviewed: 8/9/2015  © 2506-7232 [employer]. [address] [place] [address], [place] [address]. All rights reserved. This information is not intended as a substitute for professional medical care. Always follow your healthcare professional's instructions.        Continuous Positive Air Pressure (CPAP)  Continuous positive  air pressure (CPAP) uses gentle air pressure to hold the airway open. CPAP is often the most effective treatment for sleep apnea and severe snoring. It works very well for many people. But keep in mind that it can take several adjustments before the setup is right for you.    How CPAP works  The CPAPmachine  is a small portable pump beside the bed. The pump sends air through a hose, which is held over your nose and mouth by a mask. Mild air pressure is gently pushed through your airway. The air pressure nudges sagging tissues aside. This widens the airway so you can breathe better. CPAP may be combined with other kinds of therapy for sleep apnea.     A mask over the nose gently directs air into the throat to keep the airway open.   Types of air pressure treatments  There are different types of CPAP. Your doctor or CPAP technician will help you decide which type is best for you:  · Basic CPAP keeps the pressure constant all night long.  · A bilevel device (BiPAP) provides more pressure when you breathe in and less when you breathe out. A BiPAP machine also may be set to provide automatic breaths to maintain breathing if you stop breathing while sleeping.  · An autoCPAP device automatically adjusts pressure throughout the night and in response to changes such as body position, sleep stage, and snoring.  Date Last Reviewed: 8/10/2015  © 2953-1856 [employer]. [address] [place] [address], [place] [address]. All rights reserved. This information is not intended as a substitute for professional medical care. Always follow your healthcare professional's instructions.

## 2017-07-20 NOTE — LETTER
July 20, 2017      Jesse Barnes MD  [address] [facility] [place] [address]           [facility]  [address] [place] [address]  Phone: [phone]  Fax: [phone]          Patient: Bradley Petty Jr.   MR Number: [id number]   YOB: 1960   Date of Visit: 7/20/2017       Dear Dr. Jesse Barnes:    Thank you for referring Bradley Petty to me for evaluation. Attached you will find relevant portions of my assessment and plan of care.    If you have questions, please do not hesitate to call me. I look forward to following Bradley Petty along with you.    Sincerely,    Dahlia Almeida, NP    Enclosure  CC:  No Recipients    If you would like to receive this communication electronically, please contact [email or web address] or [phone] to request more information on [employer] access.    For providers and/or their staff who would like to refer a patient to [facility], please contact us through our one-stop-shop provider referral line, [facility], at [phone].    If you feel you have received this communication in error or would no longer like to receive these types of communications, please e-mail [email or web address]

## 2017-07-20 NOTE — ASSESSMENT & PLAN NOTE
Patient has gained 30 lbs since August 2016.  Has goal for weight loss. Has lost a 100 lbs in past years to normal weight.  Encouraged calorie reduction and 30 minutes of exercise daily. Discussed impact of obesity on general health and sleep apnea.

## 2017-07-27 ENCOUNTER — TELEPHONE (OUTPATIENT)
Dept: PULMONOLOGY | Facility: CLINIC | Age: 57
End: 2017-07-27

## 2017-07-28 NOTE — TELEPHONE ENCOUNTER
----- Message from Nicola Tavares sent at 7/27/2017  2:47 PM CDT -----  Contact: Jesus  I've contacted Mr Petty regarding his deductible/copay for his AutoPap and he declined set-up. Any questions, please call me @ [phone]. Thanks!

## 2017-08-10 DIAGNOSIS — N40.0 BENIGN PROSTATIC HYPERPLASIA: ICD-10-CM

## 2017-08-11 RX ORDER — TAMSULOSIN HYDROCHLORIDE 0.4 MG/1
CAPSULE ORAL
Qty: 30 CAPSULE | Refills: 0 | Status: SHIPPED | OUTPATIENT
Start: 2017-08-11 | End: 2017-09-20 | Stop reason: SDUPTHER

## 2017-08-18 ENCOUNTER — DOCUMENTATION ONLY (OUTPATIENT)
Dept: PULMONOLOGY | Facility: CLINIC | Age: 57
End: 2017-08-18

## 2017-08-18 DIAGNOSIS — G47.33 SEVERE OBSTRUCTIVE SLEEP APNEA: Primary | ICD-10-CM
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
## 2017-09-20 DIAGNOSIS — N40.0 BENIGN PROSTATIC HYPERPLASIA: ICD-10-CM

## 2017-09-20 RX ORDER — TAMSULOSIN HYDROCHLORIDE 0.4 MG/1
CAPSULE ORAL
Qty: 90 CAPSULE | Refills: 0 | Status: SHIPPED | OUTPATIENT
Start: 2017-09-20 | End: 2018-06-26

## 2017-12-13 NOTE — TELEPHONE ENCOUNTER
On call physician:  Was contacted by lab in regards to very low WBC. I attempted to call patient but no answer. I left voicemail informing him to call Dr. Barnes tomorrow to follow up but if he feels bad overnight he should report to the ED.    Complex Repair And Double M Plasty Text: The defect edges were debeveled with a #15 scalpel blade.  The primary defect was closed partially with a complex linear closure.  Given the location of the remaining defect, shape of the defect and the proximity to free margins a double M plasty was deemed most appropriate for complete closure of the defect.  Using a sterile surgical marker, an appropriate advancement flap was drawn incorporating the defect and placing the expected incisions within the relaxed skin tension lines where possible.    The area thus outlined was incised deep to adipose tissue with a #15 scalpel blade.  The skin margins were undermined to an appropriate distance in all directions utilizing iris scissors.

## 2018-02-08 ENCOUNTER — OFFICE VISIT (OUTPATIENT)
Dept: INTERNAL MEDICINE | Facility: CLINIC | Age: 58
End: 2018-02-08
Payer: COMMERCIAL

## 2018-02-08 VITALS
TEMPERATURE: 102 F | SYSTOLIC BLOOD PRESSURE: 144 MMHG | DIASTOLIC BLOOD PRESSURE: 92 MMHG | OXYGEN SATURATION: 98 % | BODY MASS INDEX: 32.49 KG/M2 | HEART RATE: 104 BPM | HEIGHT: 72 IN | WEIGHT: 239.88 LBS

## 2018-02-08 DIAGNOSIS — R50.9 FEVER, UNSPECIFIED FEVER CAUSE: Primary | ICD-10-CM

## 2018-02-08 DIAGNOSIS — B35.3 TINEA PEDIS OF BOTH FEET: ICD-10-CM

## 2018-02-08 DIAGNOSIS — B35.1 ONYCHOMYCOSIS: ICD-10-CM

## 2018-02-08 DIAGNOSIS — R05.9 COUGH: ICD-10-CM

## 2018-02-08 LAB
FLUAV AG SPEC QL IA: NEGATIVE
FLUBV AG SPEC QL IA: NEGATIVE
SPECIMEN SOURCE: NORMAL

## 2018-02-08 PROCEDURE — 99214 OFFICE O/P EST MOD 30 MIN: CPT | Mod: PO | Performed by: INTERNAL MEDICINE

## 2018-02-08 PROCEDURE — 87400 INFLUENZA A/B EACH AG IA: CPT | Mod: PO

## 2018-02-08 PROCEDURE — 99999 PR PBB SHADOW E&M-EST. PATIENT-LVL IV: CPT | Mod: PBBFAC,,, | Performed by: INTERNAL MEDICINE

## 2018-02-08 PROCEDURE — 3008F BODY MASS INDEX DOCD: CPT | Mod: S$GLB,,, | Performed by: INTERNAL MEDICINE

## 2018-02-08 PROCEDURE — 99214 OFFICE O/P EST MOD 30 MIN: CPT | Mod: S$GLB,,, | Performed by: INTERNAL MEDICINE

## 2018-02-08 RX ORDER — MAGNESIUM HYDROXIDE 400 MG/5ML
1 SUSPENSION, ORAL (FINAL DOSE FORM) ORAL DAILY
COMMUNITY

## 2018-02-08 RX ORDER — OSELTAMIVIR PHOSPHATE 75 MG/1
75 CAPSULE ORAL 2 TIMES DAILY
Qty: 10 CAPSULE | Refills: 0 | Status: SHIPPED | OUTPATIENT
Start: 2018-02-08 | End: 2018-02-13

## 2018-02-08 RX ORDER — FLUTICASONE PROPIONATE 50 MCG
2 SPRAY, SUSPENSION (ML) NASAL DAILY
Qty: 16 G | Refills: 1 | Status: SHIPPED | OUTPATIENT
Start: 2018-02-08 | End: 2018-03-10

## 2018-02-08 RX ORDER — METHYLPREDNISOLONE 4 MG/1
TABLET ORAL
Qty: 1 PACKAGE | Refills: 0 | Status: SHIPPED | OUTPATIENT
Start: 2018-02-08 | End: 2018-06-26

## 2018-02-08 RX ORDER — BENZONATATE 200 MG/1
200 CAPSULE ORAL 3 TIMES DAILY PRN
Qty: 30 CAPSULE | Refills: 0 | Status: SHIPPED | OUTPATIENT
Start: 2018-02-08 | End: 2018-02-18

## 2018-02-08 RX ORDER — PROMETHAZINE HYDROCHLORIDE AND DEXTROMETHORPHAN HYDROBROMIDE 6.25; 15 MG/5ML; MG/5ML
5 SYRUP ORAL NIGHTLY PRN
Qty: 180 ML | Refills: 0 | Status: SHIPPED | OUTPATIENT
Start: 2018-02-08 | End: 2018-06-26

## 2018-02-08 RX ORDER — CLOTRIMAZOLE 1 %
CREAM (GRAM) TOPICAL 2 TIMES DAILY
Qty: 60 G | Refills: 1 | Status: SHIPPED | OUTPATIENT
Start: 2018-02-08 | End: 2018-06-26

## 2018-02-08 RX ORDER — IBUPROFEN 800 MG/1
800 TABLET ORAL EVERY 8 HOURS
Qty: 30 TABLET | Refills: 0 | Status: SHIPPED | OUTPATIENT
Start: 2018-02-08 | End: 2018-02-18

## 2018-02-08 NOTE — PATIENT INSTRUCTIONS
Delsym as needed for cough    phenylephrine for nasal congestion    Allegra or zyrtec for allergies, post nasal drip, runny nose, watery eyes.    cepacol throat lozenges for sore throat    mucinex for chest congestion.     Alternate Tylenol 500-1000 mg every 8 hours as needed for fever and ibuprofen 800 mg every 8 hours as needed for fever.

## 2018-02-08 NOTE — PROGRESS NOTES
Subjective:      Patient ID: Bradley Petty Jr. is a 57 y.o. male.    Chief Complaint: Flu-like symptoms (fever, chills, bodyaches, diarrhea x 1 week; worse x 1 day)    58 yo with with Patient Active Problem List:     Leukocytopenia, unspecified     Lumbago     Unspecified polyarthropathy or polyarthritis, site unspecified     Primary Sjogren's syndrome     Fibromyalgia     Depressed     Pain in left shoulder     Cervical radiculitis     Primary open-angle glaucoma(365.11)     Family history of glaucoma     Refractive error     Obstructive sleep apnea syndrome     Obesity (BMI 30-39.9)    Past Medical History:  No date: Acid reflux  No date: Arthralgia  No date: Glaucoma  No date: Hypertension  No date: Sjogren's syndrome    Here today c/o cough. Chills/fever starting yesterday. Exposed to flu by coworker 2 to 4 days ago. Pt also reports thicken discolored toenails for years. Has tried no treatment. No improvement. No pain.       Cough   This is a new problem. The current episode started in the past 7 days. The problem has been rapidly worsening. The problem occurs every few minutes. The cough is non-productive. Associated symptoms include chest pain (with cough), chills, a fever, nasal congestion, postnasal drip and rhinorrhea. Pertinent negatives include no ear congestion, ear pain, headaches, hemoptysis, myalgias, sore throat, shortness of breath, sweats or wheezing. Nothing aggravates the symptoms. He has tried nothing for the symptoms. The treatment provided no relief. There is no history of asthma or COPD.     Review of Systems   Constitutional: Positive for chills and fever.   HENT: Positive for postnasal drip and rhinorrhea. Negative for ear pain and sore throat.    Respiratory: Positive for cough. Negative for hemoptysis, shortness of breath and wheezing.    Cardiovascular: Positive for chest pain (with cough).   Genitourinary: Negative for dysuria.   Musculoskeletal: Negative for myalgias.   Skin:  Negative for wound.   Neurological: Negative for weakness and headaches.     Objective:   BP (!) 144/92 (BP Location: Right arm, Patient Position: Sitting)   Pulse 104   Temp (!) 102.4 °F (39.1 °C) (Tympanic)   Ht 6' (1.829 m)   Wt 108.8 kg (239 lb 13.8 oz)   SpO2 98%   BMI 32.53 kg/m²     Physical Exam   Constitutional: He appears well-developed and well-nourished. No distress.   HENT:   Right Ear: Tympanic membrane normal.   Left Ear: Tympanic membrane normal.   Nose: Rhinorrhea present. No mucosal edema. Right sinus exhibits no maxillary sinus tenderness and no frontal sinus tenderness. Left sinus exhibits no maxillary sinus tenderness and no frontal sinus tenderness.   Mouth/Throat: Posterior oropharyngeal erythema present. No oropharyngeal exudate or posterior oropharyngeal edema.   Cardiovascular: Normal rate.    tachy   Pulmonary/Chest: Effort normal and breath sounds normal.   Musculoskeletal: He exhibits no edema.   Lymphadenopathy:     He has no cervical adenopathy.   Skin: Skin is warm and dry.   Thickened yellow toenails. Peeling white rash to web spaces of jemma feet.    Psychiatric: He has a normal mood and affect. His behavior is normal.       Assessment:     1. Fever, unspecified fever cause    2. Cough    3. Tinea pedis of both feet    4. Onychomycosis      Plan:   Fever, unspecified fever cause  -     Influenza antigen Nasopharyngeal Swab  -     oseltamivir (TAMIFLU) 75 MG capsule; Take 1 capsule (75 mg total) by mouth 2 (two) times daily.  Dispense: 10 capsule; Refill: 0    Cough  -     oseltamivir (TAMIFLU) 75 MG capsule; Take 1 capsule (75 mg total) by mouth 2 (two) times daily.  Dispense: 10 capsule; Refill: 0    Tinea pedis of both feet    Onychomycosis  -     Ambulatory referral to Podiatry    Other orders  -     benzonatate (TESSALON) 200 MG capsule; Take 1 capsule (200 mg total) by mouth 3 (three) times daily as needed for Cough.  Dispense: 30 capsule; Refill: 0  -      promethazine-dextromethorphan (PROMETHAZINE-DM) 6.25-15 mg/5 mL Syrp; Take 5 mLs by mouth nightly as needed (cough).  Dispense: 180 mL; Refill: 0  -     fluticasone (FLONASE) 50 mcg/actuation nasal spray; 2 sprays (100 mcg total) by Each Nare route once daily. For nasal congestion or runny nose  Dispense: 16 g; Refill: 1  -     methylPREDNISolone (MEDROL DOSEPACK) 4 mg tablet; use as directed  Dispense: 1 Package; Refill: 0  -     ibuprofen (ADVIL,MOTRIN) 800 MG tablet; Take 1 tablet (800 mg total) by mouth every 8 (eight) hours. Prn temperature above 100.2  Dispense: 30 tablet; Refill: 0  -     clotrimazole (LOTRIMIN) 1 % cream; Apply topically 2 (two) times daily.  Dispense: 60 g; Refill: 1    Delsym as needed for cough    phenylephrine for nasal congestion    Allegra or zyrtec for allergies, post nasal drip, runny nose, watery eyes.    cepacol throat lozenges for sore throat    mucinex for chest congestion.     Alternate Tylenol 500-1000 mg every 8 hours as needed for fever and ibuprofen 800 mg every 8 hours as needed for fever.      Problem List Items Addressed This Visit     None      Visit Diagnoses     Fever, unspecified fever cause    -  Primary    Relevant Medications    oseltamivir (TAMIFLU) 75 MG capsule    Other Relevant Orders    Influenza antigen Nasopharyngeal Swab (Completed)    Cough        Relevant Medications    oseltamivir (TAMIFLU) 75 MG capsule    Tinea pedis of both feet        Onychomycosis        Relevant Orders    Ambulatory referral to Podiatry          Follow-up if symptoms worsen or fail to improve.

## 2018-06-26 ENCOUNTER — OFFICE VISIT (OUTPATIENT)
Dept: PULMONOLOGY | Facility: CLINIC | Age: 58
End: 2018-06-26
Payer: COMMERCIAL

## 2018-06-26 ENCOUNTER — OFFICE VISIT (OUTPATIENT)
Dept: INTERNAL MEDICINE | Facility: CLINIC | Age: 58
End: 2018-06-26
Payer: COMMERCIAL

## 2018-06-26 VITALS
DIASTOLIC BLOOD PRESSURE: 86 MMHG | WEIGHT: 238.75 LBS | HEART RATE: 95 BPM | RESPIRATION RATE: 17 BRPM | OXYGEN SATURATION: 98 % | SYSTOLIC BLOOD PRESSURE: 128 MMHG | BODY MASS INDEX: 32.34 KG/M2 | HEIGHT: 72 IN

## 2018-06-26 VITALS
WEIGHT: 238.31 LBS | TEMPERATURE: 97 F | HEART RATE: 90 BPM | DIASTOLIC BLOOD PRESSURE: 80 MMHG | SYSTOLIC BLOOD PRESSURE: 130 MMHG | BODY MASS INDEX: 32.32 KG/M2 | OXYGEN SATURATION: 98 %

## 2018-06-26 DIAGNOSIS — G47.33 OBSTRUCTIVE SLEEP APNEA: Primary | ICD-10-CM

## 2018-06-26 DIAGNOSIS — G47.33 OBSTRUCTIVE SLEEP APNEA SYNDROME: ICD-10-CM

## 2018-06-26 DIAGNOSIS — E66.9 OBESITY (BMI 30-39.9): ICD-10-CM

## 2018-06-26 DIAGNOSIS — L60.8 DISCOLORED NAILS: ICD-10-CM

## 2018-06-26 DIAGNOSIS — N52.9 ERECTILE DYSFUNCTION, UNSPECIFIED ERECTILE DYSFUNCTION TYPE: ICD-10-CM

## 2018-06-26 DIAGNOSIS — Z00.00 ROUTINE GENERAL MEDICAL EXAMINATION AT A HEALTH CARE FACILITY: Primary | ICD-10-CM

## 2018-06-26 DIAGNOSIS — R68.82 DECREASED LIBIDO: ICD-10-CM

## 2018-06-26 PROCEDURE — 99396 PREV VISIT EST AGE 40-64: CPT | Mod: S$GLB,,, | Performed by: INTERNAL MEDICINE

## 2018-06-26 PROCEDURE — 99999 PR PBB SHADOW E&M-EST. PATIENT-LVL III: CPT | Mod: PBBFAC,,, | Performed by: NURSE PRACTITIONER

## 2018-06-26 PROCEDURE — 99999 PR PBB SHADOW E&M-EST. PATIENT-LVL III: CPT | Mod: PBBFAC,,, | Performed by: INTERNAL MEDICINE

## 2018-06-26 PROCEDURE — 99213 OFFICE O/P EST LOW 20 MIN: CPT | Mod: S$GLB,,, | Performed by: NURSE PRACTITIONER

## 2018-06-26 RX ORDER — SILDENAFIL CITRATE 20 MG/1
TABLET ORAL
Qty: 60 TABLET | Refills: 1 | Status: SHIPPED | OUTPATIENT
Start: 2018-06-26 | End: 2022-01-28
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
## 2018-06-26 NOTE — LETTER
June 26, 2018      Jesse Barnes MD  [address] [facility] [place] [address]           [facility] - Pulmonary Services  [address] [place] [address]  Phone: [phone]  Fax: [phone]          Patient: Bradley Petty Jr.   MR Number: [id number]   YOB: 1960   Date of Visit: 6/26/2018       Dear Dr. Jesse Barnes:    Thank you for referring Bradley Petty to me for evaluation. Attached you will find relevant portions of my assessment and plan of care.    If you have questions, please do not hesitate to call me. I look forward to following Bradley Petty along with you.    Sincerely,    Dahlia Almeida, NP    Enclosure  CC:  No Recipients    If you would like to receive this communication electronically, please contact [email or web address] or [phone] to request more information on [employer] access.    For providers and/or their staff who would like to refer a patient to [facility], please contact us through our one-stop-shop provider referral line, [facility], at [phone].    If you feel you have received this communication in error or would no longer like to receive these types of communications, please e-mail [email or web address]

## 2018-06-26 NOTE — Clinical Note
Fyi, he had declined set in 2017 r/t high copay. Ready to reconsider, new order placed. Thanks for sending him back

## 2018-06-26 NOTE — ASSESSMENT & PLAN NOTE
Never obtained when ordered in July 2017 related to high copay.  Ready to begin CPAP   New order auto CPAP 6-16.  [facility] Date of study 5/28/2010.  Follow up in 7-8 weeks for dnload
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
## 2018-06-26 NOTE — PROGRESS NOTES
Subjective:      Patient ID: Bradley Petty Jr. is a 57 y.o. male.    Chief Complaint: No chief complaint on file.    56 yo with Patient Active Problem List:     Leukocytopenia, unspecified     Lumbago     Unspecified polyarthropathy or polyarthritis, site unspecified     Primary Sjogren's syndrome     Fibromyalgia     Depressed     Pain in left shoulder     Cervical radiculitis     Primary open-angle glaucoma(365.11)     Family history of glaucoma     Refractive error     Obstructive sleep apnea syndrome     Obesity (BMI 30-39.9)    Past Medical History:  No date: Acid reflux  No date: Arthralgia  No date: Glaucoma  No date: Hypertension  No date: Sjogren's syndrome    Here today for annual prev exam.  Feeling well over all. Non smoker. Has upcoming podiatry appt for discolored nails. Denies myofacial pain and arthralgias. Continues with ed. cialis too expensive in past. Some decreased libido. Requests std testing due to girlfriends infidelity. Girlfriend and pt without symptoms.       Review of Systems   Constitutional: Negative for chills and fever.   HENT: Negative for ear pain and sore throat.    Respiratory: Negative for cough, shortness of breath and wheezing.    Cardiovascular: Negative for chest pain.   Gastrointestinal: Negative for abdominal pain and blood in stool.   Genitourinary: Negative for dysuria and hematuria.   Skin: Negative for wound.   Neurological: Negative for seizures and syncope.     Objective:   /80 (BP Location: Right arm, Patient Position: Sitting, BP Method: Medium (Manual))   Pulse 90   Temp 97.1 °F (36.2 °C)   Wt 108.1 kg (238 lb 5.1 oz)   SpO2 98%   BMI 32.32 kg/m²     Physical Exam   Constitutional: He is oriented to person, place, and time. He appears well-developed and well-nourished. No distress.   HENT:   Head: Normocephalic and atraumatic.   Mouth/Throat: Oropharynx is clear and moist.   Eyes: EOM are normal. Pupils are equal, round, and reactive to light.   Neck:  Neck supple. No thyromegaly present.   Cardiovascular: Normal rate and regular rhythm.    Pulmonary/Chest: Breath sounds normal. He has no wheezes. He has no rales.   Abdominal: Soft. Bowel sounds are normal. There is no tenderness.   Musculoskeletal: He exhibits no edema.   Lymphadenopathy:     He has no cervical adenopathy.   Neurological: He is alert and oriented to person, place, and time.   Skin: Skin is warm and dry.   Psychiatric: He has a normal mood and affect. His behavior is normal.   black area to toenail of right great toe.    Assessment:     1. Routine general medical examination at a health care facility    2. Obstructive sleep apnea syndrome    3. Discolored nails    4. Erectile dysfunction, unspecified erectile dysfunction type    5. Decreased libido      Plan:   Routine general medical examination at a health care facility  -     Cancel: Comprehensive metabolic panel; Future; Expected date: 06/26/2018  -     Cancel: CBC auto differential; Future; Expected date: 06/26/2018  -     Cancel: TSH; Future; Expected date: 06/26/2018  -     Cancel: Lipid panel; Future; Expected date: 06/26/2018  -     Cancel: PSA, Screening; Future; Expected date: 06/26/2018  -     Cancel: Hemoglobin A1c; Future; Expected date: 06/26/2018  -     Cancel: Comprehensive metabolic panel; Future; Expected date: 06/26/2018  -     Cancel: CBC auto differential; Future; Expected date: 06/26/2018  -     Cancel: TSH; Future; Expected date: 06/26/2018  -     Cancel: Lipid panel; Future; Expected date: 06/26/2018  -     Cancel: PSA, Screening; Future; Expected date: 06/26/2018  -     Cancel: Hemoglobin A1c; Future; Expected date: 06/26/2018  -     Cancel: Hepatitis panel, acute; Future; Expected date: 06/26/2018  -     Cancel: HIV-1 and HIV-2 antibodies; Future; Expected date: 06/26/2018  -     Cancel: RPR; Future; Expected date: 06/26/2018  -     Cancel: C. trachomatis/N. gonorrhoeae by AMP DNA Urine; Future; Expected date:  06/26/2018  -     Comprehensive metabolic panel; Future; Expected date: 06/26/2018  -     CBC auto differential; Future; Expected date: 06/26/2018  -     TSH; Future; Expected date: 06/26/2018  -     Lipid panel; Future; Expected date: 06/26/2018  -     PSA, Screening; Future; Expected date: 06/26/2018  -     Hemoglobin A1c; Future; Expected date: 06/26/2018  -     Hepatitis panel, acute; Future; Expected date: 06/26/2018  -     HIV-1 and HIV-2 antibodies; Future; Expected date: 06/26/2018  -     RPR; Future; Expected date: 06/26/2018  -     C. trachomatis/N. gonorrhoeae by AMP DNA Urine; Future; Expected date: 06/26/2018    Obstructive sleep apnea syndrome  Comments:  out of cpap since 2016.   Orders:  -     Ambulatory referral to Pulmonology    Discolored nails  Stressed importance of podiatry eval    Erectile dysfunction, unspecified erectile dysfunction type  -     sildenafil (REVATIO) 20 mg Tab; One to 4 tablets daily prn sexual activity. Take 30 to 45 min prior to sexual activity.  Dispense: 60 tablet; Refill: 1    Decreased libido  --     Testosterone; Future; Expected date: 06/26/2018      Check with pharmacy regarding new shingles.     Heart healthy diet and reg exercise   reviewed    Problem List Items Addressed This Visit        Other    Obstructive sleep apnea syndrome    Relevant Orders    Ambulatory referral to Pulmonology      Other Visit Diagnoses     Routine general medical examination at a health care facility    -  Primary    Relevant Orders    Comprehensive metabolic panel    CBC auto differential    TSH    Lipid panel    PSA, Screening    Hemoglobin A1c    Hepatitis panel, acute    HIV-1 and HIV-2 antibodies    RPR    C. trachomatis/N. gonorrhoeae by AMP DNA Urine    Discolored nails        Erectile dysfunction, unspecified erectile dysfunction type        Relevant Medications    sildenafil (REVATIO) 20 mg Tab    Decreased libido        Relevant Orders    Testosterone          Follow-up in  about 1 year (around 6/26/2019).

## 2018-07-05 ENCOUNTER — TELEPHONE (OUTPATIENT)
Dept: INTERNAL MEDICINE | Facility: CLINIC | Age: 58
End: 2018-07-05

## 2018-07-05 NOTE — TELEPHONE ENCOUNTER
Notified pt that lab results came in on Tuesday evening and Dr. Barnes will not be back in the clinic until Monday and will be able to review results then.  Pt verbalized understanding.

## 2018-07-05 NOTE — TELEPHONE ENCOUNTER
----- Message from Jenise France sent at 7/5/2018  3:14 PM CDT -----  6/26 results needed.....[phone] ([place])

## 2018-07-11 ENCOUNTER — OFFICE VISIT (OUTPATIENT)
Dept: PODIATRY | Facility: CLINIC | Age: 58
End: 2018-07-11
Payer: COMMERCIAL

## 2018-07-11 VITALS
SYSTOLIC BLOOD PRESSURE: 119 MMHG | HEIGHT: 72 IN | WEIGHT: 243.81 LBS | DIASTOLIC BLOOD PRESSURE: 86 MMHG | HEART RATE: 94 BPM | BODY MASS INDEX: 33.02 KG/M2

## 2018-07-11 DIAGNOSIS — L60.9 DISEASE OF NAIL: ICD-10-CM

## 2018-07-11 DIAGNOSIS — B35.3 TINEA PEDIS OF BOTH FEET: Primary | ICD-10-CM

## 2018-07-11 PROCEDURE — 99204 OFFICE O/P NEW MOD 45 MIN: CPT | Mod: S$GLB,,, | Performed by: PODIATRIST

## 2018-07-11 PROCEDURE — 88302 TISSUE EXAM BY PATHOLOGIST: CPT | Mod: 26,,, | Performed by: PATHOLOGY

## 2018-07-11 PROCEDURE — 88312 SPECIAL STAINS GROUP 1: CPT | Mod: 26,,, | Performed by: PATHOLOGY

## 2018-07-11 PROCEDURE — 99999 PR PBB SHADOW E&M-EST. PATIENT-LVL IV: CPT | Mod: PBBFAC,,, | Performed by: PODIATRIST

## 2018-07-11 PROCEDURE — 88302 TISSUE EXAM BY PATHOLOGIST: CPT | Performed by: PATHOLOGY

## 2018-07-11 RX ORDER — CLOTRIMAZOLE AND BETAMETHASONE DIPROPIONATE 10; .5 MG/ML; MG/ML
LOTION TOPICAL 2 TIMES DAILY
Qty: 30 ML | Refills: 0 | Status: SHIPPED | OUTPATIENT
Start: 2018-07-11 | End: 2022-01-28

## 2018-07-11 NOTE — LETTER
July 11, 2018      Jesse Barnes MD  [address] [place] [address]           [facility] - Podiatry  [address] [place] [address]  Phone: [phone]  Fax: [phone]          Patient: Bradley Petty Jr.   MR Number: [id number]   YOB: 1960   Date of Visit: 7/11/2018       Dear Dr. Jesse Barnes:    Thank you for referring Bradley Petty to me for evaluation. Attached you will find relevant portions of my assessment and plan of care.    If you have questions, please do not hesitate to call me. I look forward to following Bradley Petty along with you.    Sincerely,    Femi Cain DPM    Enclosure  CC:  No Recipients    If you would like to receive this communication electronically, please contact [email or web address] or [phone] to request more information on [employer] Link access.    For providers and/or their staff who would like to refer a patient to [facility], please contact us through our one-stop-shop provider referral line, [facility], at [phone].    If you feel you have received this communication in error or would no longer like to receive these types of communications, please e-mail [email or web address]

## 2018-07-11 NOTE — PROGRESS NOTES
Subjective:     Patient ID: Bradley Petty Jr. is a 57 y.o. male.    Chief Complaint: Nail Problem (toenail discoloration, possible toenail fungus )    Bradley is a 57 y.o. male who presents to the clinic complaining of thick and discolored toenails on both feet. Bradley is inquiring about treatment options.      Patient Active Problem List   Diagnosis    Leukocytopenia, unspecified    Lumbago    Unspecified polyarthropathy or polyarthritis, site unspecified    Primary Sjogren's syndrome    Fibromyalgia    Depressed    Pain in left shoulder    Cervical radiculitis    Primary open-angle glaucoma(365.11)    Family history of glaucoma    Refractive error    Obstructive sleep apnea syndrome    Obesity (BMI 30-39.9)       Medication List with Changes/Refills   New Medications    CLOTRIMAZOLE-BETAMETHASONE (LOTRISONE) LOTION    Apply topically 2 (two) times daily.   Current Medications    ACETAMINOPHEN (TYLENOL) 500 MG CAP    Take 1,000 mg by mouth once daily.    B COMPLEX VITAMINS (VITAMIN B COMPLEX) TABLET    Take 1 tablet by mouth Daily.    CHOLECALCIFEROL, VITAMIN D3, 5,000 UNIT CAPSULE    Take 5,000 Units by mouth Daily.    CYANOCOBALAMIN, VITAMIN B-12, 5,000 MCG TBDL    Take 1 tablet by mouth once daily.    HERBAL DRUGS (COLON HERBAL CLEANSER ORAL)    Take by mouth once daily.     HYDROQUINONE, BULK, 98 % POWD    Please compound Hydroquinone 8% cream. Apply to affected area twice a day x 3 months then skip a month    MULTIVITAMIN CAPSULE    Take 1 capsule by mouth Daily.    SILDENAFIL (REVATIO) 20 MG TAB    One to 4 tablets daily prn sexual activity. Take 30 to 45 min prior to sexual activity.       Review of patient's allergies indicates:   Allergen Reactions    Bactrim [sulfamethoxazole-trimethoprim]      Neutropenia         Past Surgical History:   Procedure Laterality Date    BACK SURGERY         Family History   Problem Relation Age of Onset    Diabetes Mellitus Maternal Grandmother     Rheum  arthritis Maternal Grandmother     Cancer Maternal Grandmother     Glaucoma Maternal Grandmother     Cancer Mother         kidney left    Melanoma Neg Hx     Psoriasis Neg Hx     Lupus Neg Hx     Acne Neg Hx        Social History     Social History    Marital status: Single     Spouse name: N/A    Number of children: N/A    Years of education: N/A     Occupational History     [employer]     Social History Main Topics    Smoking status: Never Smoker    Smokeless tobacco: Never Used    Alcohol use No    Drug use: No    Sexual activity: Yes     Partners: Female     Other Topics Concern    Not on file     Social History Narrative    No narrative on file       Vitals:    07/11/18 1534 07/11/18 1538   BP: (!) 142/93 119/86   Pulse: 90 94   Weight: 110.6 kg (243 lb 13.3 oz)    Height: 6' (1.829 m)    PainSc: 0-No pain        Hemoglobin A1C   Date Value Ref Range Status   07/06/2017 5.5 4.0 - 5.6 % Final     Comment:     According to ADA guidelines, hemoglobin A1c <7.0% represents  optimal control in non-pregnant diabetic patients. Different  metrics may apply to specific patient populations.   Standards of Medical Care in Diabetes-2016.  For the purpose of screening for the presence of diabetes:  <5.7%     Consistent with the absence of diabetes  5.7-6.4%  Consistent with increasing risk for diabetes   (prediabetes)  >or=6.5%  Consistent with diabetes  Currently, no consensus exists for use of hemoglobin A1c  for diagnosis of diabetes for children.  This Hemoglobin A1c assay has significant interference with fetal   hemoglobin   (HbF). The results are invalid for patients with abnormal amounts of   HbF,   including those with known Hereditary Persistence   of Fetal Hemoglobin. Heterozygous hemoglobin variants (HbAS, HbAC,   HbAD, HbAE, HbA2) do not significantly interfere with this assay;   however, presence of multiple variants in a sample may impact the %   interference.     08/26/2015 5.7 4.5 - 6.2 %  Final       Review of Systems   Constitutional: Negative for chills and fever.   Respiratory: Negative for shortness of breath.    Cardiovascular: Negative for chest pain, palpitations, orthopnea, claudication and leg swelling.   Gastrointestinal: Negative for diarrhea, nausea and vomiting.   Musculoskeletal: Negative for joint pain.   Skin: Negative for rash.   Neurological: Negative for dizziness, tingling, sensory change, focal weakness and weakness.   Psychiatric/Behavioral: Negative.              Objective:      PHYSICAL EXAM: Apperance: Alert and orient in no distress,well developed, and with good attention to grooming and body habits  LOWER EXTREMITY EXAM:  VASCULAR: Dorsalis pedis pulses 2/4 bilateral and Posterior Tibial pulses 2/4 bilateral. Capillary fill time <4 seconds bilateral. No edema observed bilateral. Varicosities absent bilateral. Skin temperature of the lower extremities is warm to warm, proximal to distal. Hair growth WNL bilateral.  DERMATOLOGICAL: No skin rashes, subcutaneous nodules, lesions, or ulcers observed bilateral. Nails 1,2 right and 1,2,3,4,5 left elongated, thickened, and discolored with subungual debris. Webspaces 4 bilateral maceration.  NEUROLOGICAL: Light touch, sharp-dull, proprioception all present and equal bilaterally.  Dry and scaly skin noted plantarly bilateral in moccasin distribution.   MUSCULOSKELETAL: Muscle strength is 5/5 for foot inverters, everters, plantarflexors, and dorsiflexors. Muscle tone is normal. Negative pain on palpation of nails bilateral.            Assessment:       Encounter Diagnoses   Name Primary?    Tinea pedis of both feet Yes    Disease of nail          Plan:   Tinea pedis of both feet  -     clotrimazole-betamethasone (LOTRISONE) lotion; Apply topically 2 (two) times daily.  Dispense: 30 mL; Refill: 0    Disease of nail  -     Tissue Specimen To Pathology, Podiatry      I counseled the patient on his conditions, regarding findings of my  examination, my impressions, and usual treatment plan.   Patient instructed to spray all shoes with Lysol disinfectant spray and let dry before wearing. Patient instructed to wash all socks in hot water and bleach.  Discuss treatment options for nail fungus.  I explained that fungus lives in a warm dark moist environment and therefore patient should make every attempt to keep feet clean and dry.  We discussed drying feet thoroughly after shower particularly between the toes and then applying powder between the toes and in the shoes.    For fungal toenails I prescribed topical medication to be used daily for up to a year.  We also discussed oral Lamisil but I did not recommend it as a first line of treatment since it is an internal medicine that may potentially have side effects, including liver problems. Prescription written for Lotrisone to be applied to the bottom of both feet twice daily.  Patient instructed to apply Betadine(Iodine) with q-tip in between affected webspaces once night and then apply cotton ball in the webspace.   With patient's permission, nail clippings obtained for fungal nail culture.   Patient to return pending nail culture results.                                 Femi Cain DPM  [facility]

## 2018-07-11 NOTE — PATIENT INSTRUCTIONS
Patient instructed to spray all shoes with Lysol disinfectant spray and let dry before wearing.   Patient instructed to wash all socks in hot water and bleach.   Patient instructed to apply Betadine(Iodine) with q-tip in between affected webspaces once night and then apply cotton ball in the webspace.

## 2018-07-12 ENCOUNTER — TELEPHONE (OUTPATIENT)
Dept: INTERNAL MEDICINE | Facility: CLINIC | Age: 58
End: 2018-07-12

## 2018-07-12 NOTE — TELEPHONE ENCOUNTER
Pt requesting prescription for Truvada.  Stated he discussed it with you when he was in clinic today.  Notified pt that message will be sent to doctor for approval.  Pt verbalized understanding.

## 2018-07-12 NOTE — TELEPHONE ENCOUNTER
Spoke with pt, notified him that Dr. Barnes reviewed his labs from [facility] and everything is normal including STD testing. Pt verbalized understanding. Copy will be mailed to patient, address verified.

## 2018-07-12 NOTE — TELEPHONE ENCOUNTER
Please notify pt that I have received his labs from [facility] and they are normal. STD testing is negative. Please provide pt a copy of his labs.

## 2018-07-16 NOTE — TELEPHONE ENCOUNTER
We discussed this medication at his last visit and decided against it after reviewing indications and risk factors. Has something changed since last visit?

## 2018-07-16 NOTE — TELEPHONE ENCOUNTER
Pt stated he did find out more information regarding his partner, but stated that he knew Dr. Barnes had said he would not order Truvada but wanted to try asking again.  Advised pt that if he has any further questions re: Truvada, to schedule an appt with an infectious disease doctor.  Pt verbalized understanding.

## 2018-07-23 ENCOUNTER — OFFICE VISIT (OUTPATIENT)
Dept: INTERNAL MEDICINE | Facility: CLINIC | Age: 58
End: 2018-07-23
Payer: COMMERCIAL

## 2018-07-23 VITALS
HEIGHT: 72 IN | BODY MASS INDEX: 31.2 KG/M2 | TEMPERATURE: 98 F | WEIGHT: 230.38 LBS | OXYGEN SATURATION: 98 % | DIASTOLIC BLOOD PRESSURE: 94 MMHG | SYSTOLIC BLOOD PRESSURE: 142 MMHG | HEART RATE: 109 BPM

## 2018-07-23 DIAGNOSIS — R19.8 CHANGE IN BOWEL MOVEMENT: ICD-10-CM

## 2018-07-23 DIAGNOSIS — R06.02 SOB (SHORTNESS OF BREATH): ICD-10-CM

## 2018-07-23 DIAGNOSIS — R03.0 ELEVATED BP WITHOUT DIAGNOSIS OF HYPERTENSION: ICD-10-CM

## 2018-07-23 DIAGNOSIS — M79.10 MYALGIA: ICD-10-CM

## 2018-07-23 DIAGNOSIS — R11.2 NAUSEA AND VOMITING, INTRACTABILITY OF VOMITING NOT SPECIFIED, UNSPECIFIED VOMITING TYPE: ICD-10-CM

## 2018-07-23 DIAGNOSIS — R53.83 FATIGUE, UNSPECIFIED TYPE: Primary | ICD-10-CM

## 2018-07-23 PROCEDURE — 99999 PR PBB SHADOW E&M-EST. PATIENT-LVL IV: CPT | Mod: PBBFAC,,, | Performed by: PHYSICIAN ASSISTANT

## 2018-07-23 PROCEDURE — 99213 OFFICE O/P EST LOW 20 MIN: CPT | Mod: S$GLB,,, | Performed by: PHYSICIAN ASSISTANT

## 2018-07-23 NOTE — PROGRESS NOTES
Subjective:       Patient ID: Bradley Petty Jr. is a 57 y.o. male.    Chief Complaint: Fatigue    57 year old male c/o fatigue and intermittent nausea X almost 2 weeks. Pt is new to me. He reports developing mylagia and intermittent subj fever with sweats recently. He reports vomiting on 2 separate occasions since last week. He reports having a possible sore throat recently, but that seems to have improved. He reports having rhinorrhea last week, but that resolved. He reports having intermittent SOB and stool has been softer than usual. He reports no watery diarrhea, abd pain, blood in stool, recent foreign travel, ear pain, rash, edema, urinary sxs, or other medical complaints. He has blood work by PCP last month - normal, including TSH and A1C. Pt reports he is UTD with his cardiologist Dr. Loomis and refuses EKG at this time. Pt reports working a lot recently and flying to different locations in the . He feels he may be overworked and need to rest. Pt saw rheum in the past for possible fibromyalgia and Sjogren's - pt reports talking to the rheumatologist and is was determined he did not have these conditions.    PCP: Dr. Barnes    Patient Active Problem List:     Leukocytopenia, unspecified     Lumbago     Unspecified polyarthropathy or polyarthritis, site unspecified     Primary Sjogren's syndrome     Fibromyalgia     Depressed     Pain in left shoulder     Cervical radiculitis     Primary open-angle glaucoma(365.11)     Family history of glaucoma     Refractive error     Obstructive sleep apnea syndrome     Obesity (BMI 30-39.9)      Review of Systems   Constitutional: Positive for fatigue and fever (subj with sweats).   HENT: Positive for rhinorrhea and sore throat. Negative for congestion, ear pain and trouble swallowing.    Eyes: Negative for visual disturbance.   Respiratory: Positive for shortness of breath. Negative for cough.    Cardiovascular: Negative for chest pain, palpitations and leg swelling.    Gastrointestinal: Positive for nausea and vomiting. Negative for abdominal pain, blood in stool and constipation.   Endocrine: Negative for polydipsia and polyuria.   Genitourinary: Negative for difficulty urinating, dysuria, frequency and urgency.   Musculoskeletal: Positive for arthralgias. Negative for joint swelling.   Skin: Negative for rash.   Neurological: Negative for dizziness, weakness, numbness and headaches.   Psychiatric/Behavioral: Negative for confusion.       Objective:       Vitals:    07/23/18 1226   BP: (!) 142/94   BP Location: Right arm   Patient Position: Sitting   BP Method: Large (Manual)   Pulse: 109   Temp: 98.2 °F (36.8 °C)   TempSrc: Tympanic   SpO2: 98%   Weight: 104.5 kg (230 lb 6.1 oz)   Height: 6' (1.829 m)     Physical Exam   Constitutional: He is oriented to person, place, and time. He appears well-developed and well-nourished. No distress.   HENT:   Head: Normocephalic and atraumatic.   Right Ear: Tympanic membrane and ear canal normal.   Left Ear: Tympanic membrane and ear canal normal.   Mouth/Throat: Oropharynx is clear and moist. No oropharyngeal exudate.   Eyes: Conjunctivae and EOM are normal. No scleral icterus.   Neck: Neck supple.   Cardiovascular: Normal rate and regular rhythm.    Pulmonary/Chest: Effort normal and breath sounds normal. No respiratory distress. He has no decreased breath sounds. He has no wheezes. He has no rhonchi. He has no rales.   Abdominal: Soft. Bowel sounds are normal. He exhibits no distension and no mass. There is no tenderness. There is no rigidity, no rebound, no guarding, no CVA tenderness, no tenderness at McBurney's point and negative Lozano's sign.   Musculoskeletal: Normal range of motion. He exhibits no edema.   Lymphadenopathy:        Head (right side): Tonsillar (minimal TTP without swelling) adenopathy present. No submandibular adenopathy present.        Head (left side): Tonsillar (minimal TTP without swelling) adenopathy present.  No submandibular adenopathy present.   Neurological: He is alert and oriented to person, place, and time. No cranial nerve deficit.   Skin: Skin is dry. No rash noted. He is not diaphoretic.   Psychiatric: He has a normal mood and affect. His speech is normal and behavior is normal. Thought content normal.       Assessment:       1. Fatigue, unspecified type    2. Nausea and vomiting, intractability of vomiting not specified, unspecified vomiting type    3. Change in bowel movement    4. Myalgia    5. SOB (shortness of breath)    6. Elevated BP without diagnosis of hypertension        Plan:         Bradley was seen today for fatigue.    Diagnoses and all orders for this visit:    Fatigue, Nausea and vomiting, Change in bowel movement, Myalgia, SOB (shortness of breath)  -     CBC auto differential; Future  -     Comprehensive metabolic panel; Future  -     Sedimentation rate; Future  -     C-reactive protein; Future  -     X-Ray Chest PA And Lateral; Future  Pt to get blood work at [facility] due to insurance coverage. Pt refuses EKG / cardiac workup. Monitor sxs and stay hydrated. Monitor BP. Consider stool studies if sxs persist/worsen. ER if severe sxs or severely elevated BP occur.    Elevated BP without diagnosis of hypertension  Monitor BP.    Follow-up with your PCP as recommended for health management.

## 2018-08-02 ENCOUNTER — TELEPHONE (OUTPATIENT)
Dept: INTERNAL MEDICINE | Facility: CLINIC | Age: 58
End: 2018-08-02

## 2018-08-02 NOTE — TELEPHONE ENCOUNTER
Prior authorization for sildenafil 20 mg denied due to pt not having a diagnosis of pulmonary hypertension.

## 2018-09-05 ENCOUNTER — OFFICE VISIT (OUTPATIENT)
Dept: INTERNAL MEDICINE | Facility: CLINIC | Age: 58
End: 2018-09-05
Payer: COMMERCIAL

## 2018-09-05 ENCOUNTER — HOSPITAL ENCOUNTER (OUTPATIENT)
Dept: RADIOLOGY | Facility: HOSPITAL | Age: 58
Discharge: HOME | End: 2018-09-05
Attending: INTERNAL MEDICINE
Payer: COMMERCIAL

## 2018-09-05 VITALS
HEIGHT: 72 IN | DIASTOLIC BLOOD PRESSURE: 80 MMHG | OXYGEN SATURATION: 99 % | HEART RATE: 80 BPM | WEIGHT: 220.88 LBS | TEMPERATURE: 98 F | SYSTOLIC BLOOD PRESSURE: 118 MMHG | BODY MASS INDEX: 29.92 KG/M2

## 2018-09-05 DIAGNOSIS — M54.40 ACUTE RIGHT-SIDED LOW BACK PAIN WITH SCIATICA, SCIATICA LATERALITY UNSPECIFIED: ICD-10-CM
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
## 2018-09-05 DIAGNOSIS — M54.2 NECK PAIN: ICD-10-CM

## 2018-09-05 DIAGNOSIS — M54.40 ACUTE RIGHT-SIDED LOW BACK PAIN WITH SCIATICA, SCIATICA LATERALITY UNSPECIFIED: Primary | ICD-10-CM

## 2018-09-05 DIAGNOSIS — T14.8XXA MUSCLE STRAIN: ICD-10-CM

## 2018-09-05 PROCEDURE — 72040 X-RAY EXAM NECK SPINE 2-3 VW: CPT | Mod: TC,FY,PO

## 2018-09-05 PROCEDURE — 99214 OFFICE O/P EST MOD 30 MIN: CPT | Mod: S$GLB,,, | Performed by: INTERNAL MEDICINE

## 2018-09-05 PROCEDURE — 99999 PR PBB SHADOW E&M-EST. PATIENT-LVL IV: CPT | Mod: PBBFAC,,, | Performed by: INTERNAL MEDICINE

## 2018-09-05 PROCEDURE — 72100 X-RAY EXAM L-S SPINE 2/3 VWS: CPT | Mod: 26,,, | Performed by: RADIOLOGY

## 2018-09-05 PROCEDURE — 72100 X-RAY EXAM L-S SPINE 2/3 VWS: CPT | Mod: TC,FY,PO

## 2018-09-05 PROCEDURE — 72040 X-RAY EXAM NECK SPINE 2-3 VW: CPT | Mod: 26,,, | Performed by: RADIOLOGY

## 2018-09-05 RX ORDER — CYCLOBENZAPRINE HCL 10 MG
TABLET ORAL
Qty: 30 TABLET | Refills: 0 | Status: SHIPPED | OUTPATIENT
Start: 2018-09-05 | End: 2018-10-06 | Stop reason: SDUPTHER

## 2018-09-05 RX ORDER — METHYLPREDNISOLONE 4 MG/1
TABLET ORAL
Qty: 1 PACKAGE | Refills: 0 | Status: SHIPPED | OUTPATIENT
Start: 2018-09-05 | End: 2018-09-21 | Stop reason: ALTCHOICE

## 2018-09-05 RX ORDER — GABAPENTIN 300 MG/1
300 CAPSULE ORAL NIGHTLY
Qty: 90 CAPSULE | Refills: 0 | Status: SHIPPED | OUTPATIENT
Start: 2018-09-05 | End: 2022-01-28

## 2018-09-05 RX ORDER — EMTRICITABINE AND TENOFOVIR DISOPROXIL FUMARATE 200; 300 MG/1; MG/1
1 TABLET, FILM COATED ORAL DAILY
COMMUNITY
Start: 2018-08-07 | End: 2019-04-16 | Stop reason: SDUPTHER

## 2018-09-05 NOTE — PROGRESS NOTES
Subjective:      Patient ID: Bradley Petty Jr. is a 57 y.o. male.    Chief Complaint: Back Pain (lower, right side down to right leg ) and Neck Pain    56 yo with Patient Active Problem List:     Leukocytopenia, unspecified     Lumbago     Unspecified polyarthropathy or polyarthritis, site unspecified     Primary Sjogren's syndrome     Fibromyalgia     Depressed     Pain in left shoulder     Cervical radiculitis     Primary open-angle glaucoma(365.11)     Family history of glaucoma     Refractive error     Obstructive sleep apnea syndrome     Obesity (BMI 30-39.9)    Past Medical History:  No date: Acid reflux  No date: Arthralgia  No date: Glaucoma  No date: Hypertension  No date: Sjogren's syndrome    Here today c/o lower back pain and neck pain starting after recent fall from a bench approx 6 days ago.        Back Pain   This is a new problem. The current episode started in the past 7 days. The problem occurs constantly. The problem has been waxing and waning since onset. The pain is present in the lumbar spine. Radiates to: right leg > left leg. Pain scale: up to 9/10. The symptoms are aggravated by sitting, bending and twisting. Pertinent negatives include no abdominal pain, bladder incontinence, bowel incontinence, chest pain, dysuria, fever, headaches, numbness, paresis or pelvic pain. Risk factors: recent fall. Treatments tried: ibu 600, 400, 600. The treatment provided moderate relief.   Neck Pain    This is a new problem. The current episode started in the past 7 days. The problem occurs constantly. The problem has been waxing and waning. The pain is associated with a fall. The pain is present in the midline and right side (posterior). The quality of the pain is described as aching (stiffness). Pain scale: 5-7/10. The symptoms are aggravated by twisting. The pain is same all the time. Stiffness is present in the morning. Pertinent negatives include no chest pain, fever, headaches, numbness, pain with  swallowing, paresis, syncope, trouble swallowing or visual change. He has tried NSAIDs for the symptoms. The treatment provided moderate relief.     Review of Systems   Constitutional: Negative for chills and fever.   HENT: Negative for ear pain, sore throat and trouble swallowing.    Respiratory: Negative for cough.    Cardiovascular: Negative for chest pain and syncope.   Gastrointestinal: Negative for abdominal pain, blood in stool and bowel incontinence.   Genitourinary: Negative for bladder incontinence, dysuria, hematuria and pelvic pain.   Musculoskeletal: Positive for back pain and neck pain.   Neurological: Negative for seizures, syncope, numbness and headaches.     Objective:   /80 (BP Location: Right arm, Patient Position: Sitting)   Pulse 80   Temp 97.5 °F (36.4 °C) (Tympanic)   Ht 6' (1.829 m)   Wt 100.2 kg (220 lb 14.4 oz)   SpO2 99%   BMI 29.96 kg/m²     Physical Exam   Constitutional: He is oriented to person, place, and time. He appears well-developed and well-nourished. No distress.   Neck: Spinous process tenderness and muscular tenderness present. No neck rigidity. Normal range of motion present.       Cardiovascular: Normal rate and regular rhythm.   Pulmonary/Chest: Effort normal and breath sounds normal.   Musculoskeletal: He exhibits no edema.        Lumbar back: He exhibits decreased range of motion (due to pulling pain to left lumbar area. ). He exhibits no tenderness, no bony tenderness and no swelling.   Neurological: He is alert and oriented to person, place, and time. He displays normal reflexes. He exhibits normal muscle tone.   Skin: Skin is warm and dry.   Psychiatric: He has a normal mood and affect. His behavior is normal.   slt neg jemma    Assessment:     1. Acute right-sided low back pain with sciatica, sciatica laterality unspecified    2. Neck pain    3. Muscle strain    4. Fall, initial encounter      Plan:   Acute right-sided low back pain with sciatica, sciatica  laterality unspecified  -     methylPREDNISolone (MEDROL, LINA,) 4 mg tablet; use as directed  Dispense: 1 Package; Refill: 0  -     gabapentin (NEURONTIN) 300 MG capsule; Take 1 capsule (300 mg total) by mouth every evening.  Dispense: 90 capsule; Refill: 0  -     Ambulatory Referral to Physiatry  -     X-Ray Lumbar Spine AP And Lateral; Future; Expected date: 09/05/2018    Neck pain  -     cyclobenzaprine (FLEXERIL) 10 MG tablet; 1/2 to one tab po qhs prn muscle spasm  Dispense: 30 tablet; Refill: 0  -     methylPREDNISolone (MEDROL, LINA,) 4 mg tablet; use as directed  Dispense: 1 Package; Refill: 0  -     Ambulatory Referral to Physiatry  -     X-Ray Cervical Spine AP And Lateral; Future; Expected date: 09/05/2018    Muscle strain  -     cyclobenzaprine (FLEXERIL) 10 MG tablet; 1/2 to one tab po qhs prn muscle spasm  Dispense: 30 tablet; Refill: 0  -     Ambulatory Referral to Physiatry    Fall, initial encounter  -     X-Ray Lumbar Spine AP And Lateral; Future; Expected date: 09/05/2018        Lab Frequency Next Occurrence   X-Ray Chest PA And Lateral Once 07/23/2018       Problem List Items Addressed This Visit        Orthopedic    Lumbago - Primary    Relevant Medications    methylPREDNISolone (MEDROL, LINA,) 4 mg tablet    gabapentin (NEURONTIN) 300 MG capsule    Other Relevant Orders    Ambulatory Referral to Physiatry    X-Ray Lumbar Spine AP And Lateral (Completed)      Other Visit Diagnoses     Neck pain        Relevant Medications    cyclobenzaprine (FLEXERIL) 10 MG tablet    methylPREDNISolone (MEDROL, LINA,) 4 mg tablet    Other Relevant Orders    Ambulatory Referral to Physiatry    X-Ray Cervical Spine AP And Lateral (Completed)    Muscle strain        Relevant Medications    cyclobenzaprine (FLEXERIL) 10 MG tablet    Other Relevant Orders    Ambulatory Referral to Physiatry    Fall, initial encounter        Relevant Orders    X-Ray Lumbar Spine AP And Lateral (Completed)          Follow-up if symptoms  worsen or fail to improve.

## 2018-09-21 ENCOUNTER — INITIAL CONSULT (OUTPATIENT)
Dept: PHYSICAL MEDICINE AND REHAB | Facility: CLINIC | Age: 58
End: 2018-09-21
Payer: COMMERCIAL

## 2018-09-21 VITALS
WEIGHT: 220 LBS | DIASTOLIC BLOOD PRESSURE: 83 MMHG | HEART RATE: 83 BPM | HEIGHT: 72 IN | SYSTOLIC BLOOD PRESSURE: 125 MMHG | BODY MASS INDEX: 29.8 KG/M2

## 2018-09-21 DIAGNOSIS — M47.26 OSTEOARTHRITIS OF SPINE WITH RADICULOPATHY, LUMBAR REGION: ICD-10-CM

## 2018-09-21 DIAGNOSIS — M47.812 SPONDYLOSIS OF CERVICAL REGION WITHOUT MYELOPATHY OR RADICULOPATHY: ICD-10-CM

## 2018-09-21 DIAGNOSIS — M51.36 DDD (DEGENERATIVE DISC DISEASE), LUMBAR: ICD-10-CM

## 2018-09-21 DIAGNOSIS — S16.1XXS CERVICAL MUSCLE STRAIN, SEQUELA: ICD-10-CM

## 2018-09-21 DIAGNOSIS — M46.1 SACROILIITIS, NOT ELSEWHERE CLASSIFIED: Primary | ICD-10-CM

## 2018-09-21 PROCEDURE — 99244 OFF/OP CNSLTJ NEW/EST MOD 40: CPT | Mod: S$GLB,,, | Performed by: PHYSICAL MEDICINE & REHABILITATION

## 2018-09-21 PROCEDURE — 99999 PR PBB SHADOW E&M-EST. PATIENT-LVL III: CPT | Mod: PBBFAC,,, | Performed by: PHYSICAL MEDICINE & REHABILITATION

## 2018-09-21 NOTE — LETTER
September 21, 2018      Jesse Barnes MD  [address] [place] [address]           [facility]  [address] [facility] [address] [facility] [place] [address]  Phone: [phone]  Fax: [phone]          Patient: Bradley Petty Jr.   MR Number: [id number]   YOB: 1960   Date of Visit: 9/21/2018       Dear Dr. Jesse Barnes:    Thank you for referring Bradley Petty to me for evaluation. Attached you will find relevant portions of my assessment and plan of care.    If you have questions, please do not hesitate to call me. I look forward to following Bradley Petty along with you.    Sincerely,    Ngozi Cabrera MD    Enclosure  CC:  No Recipients    If you would like to receive this communication electronically, please contact [email or web address] or [phone] to request more information on hereO Link access.    For providers and/or their staff who would like to refer a patient to [facility], please contact us through our one-stop-shop provider referral line, [facility], at [phone].    If you feel you have received this communication in error or would no longer like to receive these types of communications, please e-mail [email or web address]

## 2018-09-21 NOTE — PATIENT INSTRUCTIONS
Understanding Cervical Strain    There are 7 bones (vertebrae) in the neck that are part of the spine. These are called the cervical spine. Cervical strain is a medical term for neck pain. The neck has several layers of muscles. These are connected with tendons to the cervical spine and other bones. Neck pain is often the result of injury to these muscles and tendons.  Causes of cervical strain  Different types of stress on the neck can damage muscles and tendons (soft tissues) and cause cervical strain. Cervical tissues can be damaged by:  · The neck being forced past its normal range of motion, such as in a car accident or sports injury  · Constant, low-level stress, such as from poor posture or a poorly set-up workspace  Symptoms of cervical strain  These may include:  · Neck pain or stiffness  · Pain in the shoulders or upper back  · Muscle spasms  · Headache, often starting at the base of the neck  · Irritability, difficulty concentrating, or sleeplessness  Treatment for cervical strain  This problem often gets better on its own. Treatments aim to reduce pain and inflammation and increase the range of motion of the neck. Possible treatments include:  · Over-the-counter or prescription pain medicine. These help relieve pain and inflammation.  · Stretching exercises to decrease neck stiffness.  · Massage to decrease neck stiffness.  · Cold or heat pack. These help reduce pain and swelling.  Call 911  Call emergency services right away if you have any of these:  · Face drooping or numbness  · Numbness or weakness, especially in the arms or on one side  · Slurred speech or difficulty speaking  · Blurred vision   When to call your healthcare provider  Call your healthcare provider right away if you have any of these:  · Fever of 100.4°F (38°C) or higher, or as directed  · Pain or stiffness that gets worse  · Symptoms that dont get better, or get worse  · Numbness, tingling, weakness or shooting pains into the  arms or legs  · New symptoms  Date Last Reviewed: 3/10/2016  © 1211-6343 [employer]. [address] [place] [address]. All rights reserved. This information is not intended as a substitute for professional medical care. Always follow your healthcare professional's instructions.        Head Tilt / Upper Trapezius Stretch (Flexibility)    1. Sit up straight in a chair with your head and neck in a neutral position, ears in line with shoulders. Hold the edge of your chair seat with your right hand. Tuck your chin in slightly.  2. Tilt your head to the left, while looking straight ahead.  3. Put your left hand on the right side of your head. Gently pull your head to the left. Hold for 30 to 60 seconds. Use gentle pressure to increase the stretch. Dont force your head into position.  4. Return your head and neck to the neutral position.  5. Repeat this exercise 2 times, or as instructed.  6. Switch sides and repeat 2 times, or as instructed.  Challenge yourself  Tuck one end of a towel under your left arm. Then bring the other end over your right shoulder. Pull the towel down on your right shoulder with both hands as you side-bend your head to the left. Repeat with the other side.   Date Last Reviewed: 3/10/2016  © 8531-0410 [employer]. [address] [place] [address]. All rights reserved. This information is not intended as a substitute for professional medical care. Always follow your healthcare professional's instructions.        Neck Clock (Flexibility)    7. Lie on your back on the floor, with your knees bent and your feet flat on the floor. Place a rolled-up towel or neck roll under your neck.  8. Close your eyes and imagine a clock face. With your nose, slowly trace the outer edge of the clock in a clockwise direction. Move your neck smoothly. Dont force your head or neck.  9. Repeat 5 times, or as instructed.  10. Then switch to a counterclockwise direction, and  repeat the exercise 5 times, or as instructed.     Challenge yourself  You can also do this exercise while sitting at your work desk. Sit up straight with your back supported firmly against your chair. You can do the exercise several times throughout your day.   Date Last Reviewed: 3/10/2016  © 9634-2758 [employer]. [address] [place] [address], [place] [address]. All rights reserved. This information is not intended as a substitute for professional medical care. Always follow your healthcare professional's instructions.        Understanding Neck Problems       If you suffer from neck pain, youre not alone. Many people have neck pain at some point in their lives. Problems such as poor posture, injury, and wear and tear can lead to neck pain. Your healthcare provider will work with you to find the treatment thats best for your neck.  Types of neck problems    The following problems can cause pain or injury in your neck:  · Strains and sprains: Strains (stretched or torn muscles) and sprains (stretched or torn ligaments) can cause neck pain. Strains and sprains can occur during an accident, or when you overuse your neck through repetitive motion. They can also cause your muscles and ligaments to become inflamed (swollen and painful).  · Whiplash and other injuries: Whiplash can result when an impact throws your head, forcing your neck too far forward, then too far backward. When combined, the two motions can cause a painful injury to different parts of your neck, such as muscles, ligaments, or joints. The most common cause of whiplash is a car accident. But it can also happen during a fall or sports injury.  · Weakened disks: A simple action, such as a sneeze or a cough, can cause one of your disks to bulge or rupture (herniate). A herniated disk can put pressure on your nerve and cause pain. Over time, disks can also thin out (degenerate). Flattened disks dont cushion vertebrae well and can cause  vertebrae to rub together. Also, there is less space for the nerves. This can pinch nerves and cause pain.  · Weakened joints: Aging and injury can cause joints to slowly degenerate. Thinned joints can also cause vertebrae to rub together. This can cause abnormal growths of bone (bone spurs) to form on vertebrae. Bone spurs put pressure on nerves, causing pain.  Common symptoms  If you have a neck problem, you may have one or more of the following symptoms:  · Muscle tension and spasm: You may not be able to move your neck, arms, or shoulders comfortably if you have muscle tension or stiffness in your neck. If your symptoms arent relieved, you may experience muscle spasms, or knots of contracted tissue (trigger points) in areas of your neck and shoulders.  · Aches and pains: Dull aches in your head or neck, sharp pains, and swelling of the soft tissue of your neck and shoulders are common symptoms. If theres pressure on the nerves in your neck, you may feel pain in your arms or hands.  · Numbness or weakness: If you injure the nerves in your neck, you may have numbness, tingling, or weakness in your shoulders, arms, or hands. These symptoms arise when disks or bone spurs press on the nerves in your neck. Severe disease can also affect your legs.  Date Last Reviewed: 8/23/2015 © 2000-2017 [employer]. [address] [place] [address]. All rights reserved. This information is not intended as a substitute for professional medical care. Always follow your healthcare professional's instructions.        Myofascial Pain Syndrome: Fibrositis  Your pain is caused by a state of chronic muscle tension. This condition is called by various names: myofascial pain, fibrositis and trigger point pain. This can also be due to mechanical stress (such as working at a computer terminal for long periods; or work that requires repetitive motions of the arms or hands) or emotional stress (such as problems on the  job or in your personal life). Sometimes there is no obvious cause. The pain can occur in the area of the muscle spasm or at a site distant to it. For example, spasm of a neck muscle can cause headache. Spasm of the muscle near the shoulder blade can cause pain shooting down the arm.  Home Care:  · Try to identify the factors that may be causing your problem and change them:  ¨ If you feel that emotional stress is a cause of your pain, learn methods to deal more effectively with the stress in your life. These may include regular exercise, muscle relaxation techniques, meditation or simply taking time out for yourself. Consult your doctor or go to a local bookstore and review the many books and tapes available on the subject of stress reduction.  ¨ If you feel that physical stress is a cause for your pain, try to modify any poor work habits.  · You may use acetaminophen (Tylenol) or ibuprofen (Motrin, Advil) to control pain, unless another medicine was prescribed. [NOTE: If you have chronic liver or kidney disease or ever had a stomach ulcer or GI bleeding, talk with your doctor before using these medicines.]  · The use of heat to the muscle (hot compress or heating pad) will be helpful to reduce muscle spasm. Some persons get relief with ice packs. Apply an ice pack (crushed or cubed ice in a plastic bag, wrapped in a towel) for 20 minutes at a time as needed. Use the method that feels best to you.  · Massaging the trigger point and stretching out the muscle are an important parts of prevention and treatment. Trigger point massage can be done by first applying heat to the area to warm and prepare the muscle. Have someone apply steady thumb pressure directly on the knot in the muscle (the most tender point) for 30 seconds. Release the pressure, then massage the surrounding muscle. Repeat the process, applying more pressure to the trigger point each time. Do this up to the limit of pain. With each treatment, the  trigger point should become less tender and the pain should decrease. You can apply local pressure to trigger points in the back by lying on the floor with a tennis ball under the trigger point.  Follow Up  with your doctor as advised or if not improving within the next week. It may be necessary for you to receive physical therapy if you do not respond to home treatment alone.  Get Prompt Medical Attention  if any of the following occur:  · If your trigger point is in the chest muscles, observe for pain that becomes more severe, lasts longer, or spreads into your shoulder/arm, neck or back; you develop trouble breathing, sweating, nausea or vomiting in association with chest pain  · If you develop weakness or numbness in an extremity  · If your pain worsens, regardless of its location  © 0537-8621 [employer]. [address] [place] [address]. All rights reserved. This information is not intended as a substitute for professional medical care. Always follow your healthcare professional's instructions.          Trigger Point Injection  The cause of your muscle pain or spasms may be one or more trigger points. Your health care provider may decide to inject the painful spots to relax the muscle. This can help relieve your pain. Relaxing the muscle can also make movement easier. You may then be able to exercise to strengthen the muscle and help it heal.    What is a trigger point?  A trigger point is a tight, painful knot of muscle fiber. It can form where a muscle is strained or injured. The knot can sometimes be felt under the skin. A trigger point is very tender to the touch. Pain may also spread to other parts of the affected muscle. Muscles around a knee, shoulder blade, or other bones are prone to trigger points. This is because these muscles are more likely to be injured.    About the injections  Any muscle in the body can have one or more trigger points. Several injections may be needed  in each trigger point to best relieve pain. These injections may be given in sessions about 2 weeks apart, depending on the preference of your health care provider. In some cases, you may not feel much change in your symptoms until after the third injection.     © 9726-6130 [employer]. [address] [place] [address]. All rights reserved. This information is not intended as a substitute for professional medical care. Always follow your healthcare professional's instructions.            Your Neck Muscles  The muscles in the neck and shoulders need to be strong to hold the neck and head in place. These muscles also help move the neck and shoulders. Your health care provider can recommend exercises to help stretch and strengthen your neck muscles.    © 4394-4454 [employer]. [address] [place] [address]. All rights reserved. This information is not intended as a substitute for professional medical care. Always follow your healthcare professional's instructions.          Neck Problems: Relieving Your Symptoms  The first goal of treatment is to relieve your symptoms. Your health care provider may recommend self-care treatments. These include resting, applying ice and heat, taking medication, and doing exercises. Your health care provider may also recommend that you see a physical therapist, who can teach you ways to care for and strengthen your neck.    Self-Care Treatments  Pain can end quickly or last awhile. Either way, youll want relief as soon as possible. Your health care provider can tell you which treatments to do at home to help relieve your pain.  · Lying down for a short time takes pressure from the head off the neck.  · Ice and heat can help reduce pain. To bring down swelling, rest an ice pack wrapped in a thin towel on your neck for 15 minutes. To relax sore muscles, apply a warm, wet towel to the area. Or take a warm bath or shower.  · Over-the-counter  medications, such as ibuprofen, naproxen, and aspirin, can help reduce pain and swelling. Acetaminophen can help relieve pain. Use these only as directed.  · Exercises can relax muscles and prevent stiffness. To prepare, drape a warm, wet towel around your neck and shoulders for 5 minutes. Remove the towel. Then do any exercises recommended to you by your health care provider.  Physical Therapy  If self-care treatments arent helping relieve neck pain, your health care provider may suggest one or more sessions of physical therapy. Physical therapy is performed by a specialist trained to treat injuries. Your physical therapist (PT) will teach you how to strengthen muscles, improve the spines alignment, and help you move properly. Treatment methods used in physical therapy may include:  · Heat. A special heating pad called a neck pack may be applied to your neck.  · Exercises. Your PT will teach you exercises to help strengthen your neck and improve its range of motion.  · Joint mobilization. The PT gently moves your vertebrae to help restore motion in your neck joints and reduce neck pain.  · Soft tissue mobilization. The PT massages and stretches the muscles in your neck and shoulders.  · Electrical stimulation. Electrical impulses are sent into your neck. This helps reduce soreness and inflammation.  · Education in body mechanics. The PT shows you ways to position and move your body that protect the neck.  Other Treatments  If physical therapy doesnt relieve your neck pain, your health care provider may suggest other treatments. For example, medications or injections can help relieve pain and swelling. In some cases, surgery may be needed to treat neck problems.  © 7234-0264 [employer]. [address] [place] [address], [place] [address]. All rights reserved. This information is not intended as a substitute for professional medical care. Always follow your healthcare professional's  instructions.          Understanding Neck Problems       If you suffer from neck pain, youre not alone. Many people have neck pain at some point in their lives. Problems such as poor posture, injury, and wear and tear can lead to neck pain. Your health care provider will work with you to find the treatment thats best for your neck.  Types of Neck Problems  The following problems can cause pain or injury in your neck:  · Strains and sprains: Strains (stretched or torn muscles) and sprains (stretched or torn ligaments) can cause neck pain. Strains and sprains can occur during an accident, or when you overuse your neck through repetitive motion. They can also cause your muscles and ligaments to become inflamed (swollen and painful).  · Whiplash and other injuries: Whiplash can result when an impact throws your head, forcing your neck too far forward (hyperflexion), then too far backward (hyperextension). When combined, the two motions can cause a painful injury to different parts of your neck, such as muscles, ligaments, or joints. The most common cause of whiplash is a car accident. But it can also happen during a fall or sports injury.  · Weakened disks: A simple action, such as a sneeze or a cough, can cause one of your disks to bulge (herniate). A herniated disk can put pressure on your nerve and cause pain. Over time, disks can also thin out (degenerate). Flattened disks dont cushion vertebrae well and can cause vertebrae to rub together. Rubbing vertebrae can pinch nerves and cause pain.  · Weakened joints: Aging and injury can cause joints to slowly degenerate. Thinned joints can also cause vertebrae to rub together. This can cause abnormal growths of bone (bone spurs) to form on vertebrae. Bone spurs put pressure on nerves, causing pain.  Common Symptoms  If you have a neck problem, you may have one or more of the following symptoms:  · Muscle tension and spasm: You may not be able to move your neck, arms,  or shoulders comfortably if you have muscle tension or stiffness in your neck. If your symptoms arent relieved, you may experience muscle spasms, or knots of contracted tissue (trigger points) in areas of your neck and shoulders.  · Aches and pains: Dull aches in your head or neck, sharp pains, and swelling of the soft tissue of your neck and shoulders are common symptoms. If theres pressure on the nerves in your neck, you may feel pain in your arms or hands (referred pain).  · Numbness or weakness: If you injure the nerves in your neck, you may experience numbness, tingling, or weakness in your shoulders, arms, or hands. These symptoms arise when disks or bone spurs press on the nerves in your neck.  © 9340-7715 [employer]. [address] [place] [address], [place] [address]. All rights reserved. This information is not intended as a substitute for professional medical care. Always follow your healthcare professional's instructions.          Neck Spasm [No Trauma]    Spasm of the neck muscles can occur after a sudden awkward neck movement. Sleeping with your neck in a crooked position can also cause spasm. Some persons respond to emotional stress by tensing the muscles of their neck, shoulders and upper back. If neck spasm lasts long enough, it can cause headache.  The treatment described below will usually help the pain to go away in 5-7 days. Pain that continues may require further evaluation or other types of treatment such as physical therapy.  Home Care:  11. Rest and relax the muscles. Use a comfortable pillow that supports the head and keeps the spine in a neutral position. The position of the head should not be tilted forward or backward. A rolled up towel may help for a custom fit.  12. Some persons find relief with heat (hot shower, hot bath or heating pad) and massage, while others prefer cold packs (crushed or cubed ice in a plastic bag, wrapped in a towel). Try both and use the method that feels  best for 20 minutes several times a day.  13. You may use acetaminophen (Tylenol) or ibuprofen (Motrin, Advil) to control pain, unless another medicine was prescribed. [NOTE: If you have chronic liver or kidney disease or ever had a stomach ulcer or GI bleeding, talk with your doctor before using these medicines.]  Follow Up  with your doctor or this facility if your symptoms do not show signs of improvement after one week. Physical therapy or further evaluation may be needed.  [NOTE: If x-rays were taken, they will be reviewed by a radiologist. You will be notified of any new findings that may affect your care.]  Return Promptly  or contact your doctor if any of the following occurs:  · Pain becomes worse or spreads into one or both arms  · Weakness or numbness in one or both arms  · Increasing headache with nausea or vomiting  · Fever over 100.4ºF (38.0ºC)  © 1040-1431 [employer]. [address] [place] [address]. All rights reserved. This information is not intended as a substitute for professional medical care. Always follow your healthcare professional's instructions.          Know Your Neck: The Cervical Spine  By learning about the parts of the neck, you can better understand your neck problem. The bones of the neck are called cervical vertebrae, commonly identified as C1 through C7. Together, they form a bony column called the spine. Vertebrae also protect the spinal cord, a pathway for messages to reach the brain. Surrounding the spine are soft tissues such as muscles, tendons, and nerves.        Flexibility Is Key  For the neck to function normally, it has to be flexible enough to move without discomfort. A healthy neck can move easily in six different directions.    © 1225-1157 [employer]. [address] [place] [address]. All rights reserved. This information is not intended as a substitute for professional medical care. Always follow your healthcare  professional's instructions.          Protecting Your Neck: Posture and Body Mechanics  Protecting your neck from injuries and pain involves practicing good posture and body mechanics. This may mean correcting bad habits you have related to the way you hold and move your body. The tips below can help you improve your posture and body mechanics.    What Is Posture and Why Does It Matter?  Posture is the way you hold your body. For many of us, this means hunching over, thrusting the chin forward, and slouching the shoulders. But this kind of poor posture keeps muscles from properly supporting the neck and puts stress on muscles, disks, ligaments, and joints in your neck. As a result, injury and pain can occur.  How Is Your Posture?  Use a full-length mirror to check your posture. To begin, stand normally. Then slowly back up against a wall. Is there space between your head and the wall? Do you slouch your shoulders? Is your chin pointing up or down? All these can cause neck pain and injury.  Improving Your Posture  Follow these steps to improve your posture:  · Pull your shoulders back.  · Think of the ears, shoulders, and hips as a series of dots. Now, adjust your body to connect the dots in a straight line.  · Keep your chin level.  What Are Body Mechanics and Why Do They Matter?  The way you move and position your body during daily activities is called body mechanics. Good body mechanics help protect the neck. This means learning the right ways to stand, sit, and even sleep. So do whats best for your neck and practice good body mechanics.  Standing   To protect your neck while standing:  · Carry objects close to your body.  · Keep your ears and shoulders in a line while standing or walking.  · To lower yourself, bend at the knees with a straight back. Do this instead of looking down and reaching for objects.  · Work at eye level. Dont reach above your head or tilt your head back.  Sitting   To protect your neck  while sitting:  · Set up your workstation so your monitor is at eye level. Also, use a document tinajero when viewing papers or books.  · Keep your knees at or slightly below the level of your hips.  · Sit up straight, with feet flat on the floor. If your feet dont touch the floor, use a footrest.  · Avoid sitting or driving for long periods. Take frequent breaks.  Sleeping   To protect your neck while sleeping:  · Sleep on your back with a pillow under your knees, or on your side with a pillow between bent knees. This helps align the spine.  · Avoid using pillows that are too high or too low. Instead, use a neck roll or pillow under your neck while you sleep to keep the neck straight.  · Sleep on a mattress that supports you, with a pillow under your neck.  © 1795-1790 [employer]. [address] [place] [address]. All rights reserved. This information is not intended as a substitute for professional medical care. Always follow your healthcare professional's instructions.          Exercises at Your Workstation: Eyes, Neck, and Head     Tired eyes? Stiff neck? A few easy moves can help prevent these kinds of problems. Take a few minutes during your day to do these exercises--right at your desk. They'll loosen up your muscles, keep you more alert, and make a big difference in how you work and feel.    For your eyes  Eye cup  · Lean forward with your elbows on your desk.  · Cup your hands and place them lightly over your closed eyes. Hold for a minute, while breathing deeply in and out.  · Slowly uncover and open your eyes. Repeat 2 times.  Eye roll  · Close your eyes. Slowly roll your eyeballs clockwise all the way around. Repeat 3 times.  · Now slowly roll them all the way around counterclockwise. Repeat 3 times.  Eye rest  · Every 20 minutes, look away from the computer screen. Focus on an object at least 20 feet away. Stay focused on this object for a full 20 seconds.    For your neck and  head  Warm-up  · Drop your head gently to your chest. While breathing in, slowly roll your head up to your left shoulder. While breathing out, slowly roll your head back to center. Repeat to the right.  · Repeat 3 times on each side.  Head tilt  · Sit up straight. Tuck in your chin.  · Slowly tip your head to the left. Return to the center. Then, tip your head to the right.  · Repeat 3 times on each side.    Head turn  · Sit up straight.  · Slowly turn your head and look over your left shoulder. Hold for a few seconds. Go back to the center, then repeat to your right.  · Repeat 3 times on each side.  © 8914-0135 [employer]. [address] [place] [address]. All rights reserved. This information is not intended as a substitute for professional medical care. Always follow your healthcare professional's instructions.          Reach and Hold Exercise    Do this exercise on your hands and knees. Keep your knees under your hips and your hands under your shoulders. Keep your spine in a neutral position (not arched or sagging). Keep your ears in line with your shoulders. Hold for a few seconds before starting the exercise:  14. Tighten your abdominal muscles and raise one arm straight in front of you, palm down. Hold for 5 seconds, then lower. Repeat 5 times.  15. Do the exercise again, this time lifting your arm to the side. Repeat 5 times.  16. Do the exercise again, this time lifting your arm backward, palm up. Repeat 5 times.  Switch sides and do each exercise with the other arm.  © 7420-0272 [employer]. [address] [place] [address]. All rights reserved. This information is not intended as a substitute for professional medical care. Always follow your healthcare professional's instructions.        Shoulder and Upper Back Stretch  To start, stand tall with your ears, shoulders, and hips in line. Your feet should be slightly apart, positioned just under your hips. Focus  your eyes directly in front of you.  this position for a few seconds before starting your exercise. This helps increase your awareness of proper posture.  Reach overhead and slightly back with both arms. Keep your shoulders and neck aligned and your elbows behind your shoulders:  · With your palms facing the ceiling, turn your fingers inward.  · Take a deep breath. Breathe out, and lower your elbows toward your buttocks. Hold for 5 seconds, then return to starting position.  · Repeat 3 times.    © 3089-0068 [employer]. [address] [place] [address]. All rights reserved. This information is not intended as a substitute for professional medical care. Always follow your healthcare professional's instructions.          Shoulder Clock Exercise  To start, stand tall with your ears, shoulders, and hips in line. Your feet should be slightly apart, positioned just under your hips. Focus your eyes directly in front of you.  this position for a few seconds before starting your exercise. This helps increase your awareness of proper posture.  · Imagine that your right shoulder is the center of a clock. With the outer point of your shoulder, roll it around to slowly trace the outer edge of the clock.  · Move clockwise first, then counterclockwise.  · Repeat 3 times. Switch shoulders.   © 3124-5991 [employer]. [address] [place] [address]. All rights reserved. This information is not intended as a substitute for professional medical care. Always follow your healthcare professional's instructions.          Shoulder Girdle Stretch     To start, sit in a chair with your feet flat on the floor. Your weight should be slightly forward so that youre balanced evenly on your buttocks. Relax your shoulders and keep your head level. Using a chair with arms may help you keep your balance:  · Place 1 hand on the outside elbow of the other arm.  · Pull the arm across your  body. Hold for 30 to 60 seconds. Repeat once.  · Switch sides.    © 8184-9413 [employer]. [address] [place] [address]. All rights reserved. This information is not intended as a substitute for professional medical care. Always follow your healthcare professional's instructions.          Shoulder Exercises      To start, sit in a chair with your feet flat on the floor. Your weight should be slightly forward so that youre balanced evenly on your buttocks. Relax your shoulders and keep your head level. Avoid arching your back or rounding your shoulders. Using a chair with arms may help you keep your balance.  · Raise your arms, elbows bent, to shoulder height.  · Slowly move your forearms together. Hold for 5 seconds.  · Return to starting position. Repeat 5 times.  © 0961-4844 [employer]. [address] [place] [address]. All rights reserved. This information is not intended as a substitute for professional medical care. Always follow your healthcare professional's instructions.        Shoulder Shrug Exercise  To start, sit in a chair with your feet flat on the floor. Shift your weight slightly forward to avoid rounding your back. Relax. Keep your ears, shoulders, and hips aligned:  · Raise both of your shoulders as high as you can, as if you were trying to touch them to your ears. Keep your head and neck still and relaxed.  · Hold for a count of 10. Release.  · Repeat 5 times.    © 1181-9702 [employer]. [address] [place] [address]. All rights reserved. This information is not intended as a substitute for professional medical care. Always follow your healthcare professional's instructions.          Shoulder Squeeze Exercise     To start, sit in a chair with your feet flat on the floor. Shift your weight slightly forward to avoid rounding your back. Relax. Keep your ears, shoulders, and hips aligned:  · Raise your arms to shoulder  height, elbows bent and palms forward.  · Move your arms back, squeezing your shoulder blades together.  · Hold for 10 seconds. Return to starting position.   · Repeat 5 times.     © 5549-3987 [employer]. [address] [place] [address], [place] [address]. All rights reserved. This information is not intended as a substitute for professional medical care. Always follow your healthcare professional's instructions.          Sacroiliitis  The sacrum is the triangle-shaped bone at the base of the spine. It is linked to the other pelvis bones by the sacroiliac joints, also called SI joints. Sacroiliitis is when one or both SI joints are hurt or inflamed. It can make small movements of the lower back and pelvis very painful.        This condition has been linked to other diseases. They include ankylosing spondylitis, rheumatoid arthritis, psoriasis, and Crohns disease or colitis. Symptoms may include pain or stiffness in the hips, lower back, thighs, or buttocks. Pain occurs most often in the morning or after sitting for long periods of time. The pain may get worse when you walk. The swinging motion of the hips strains the SI joints.  Sacroiliitis is caused by many factors such as:  · Heavy lifting, especially if not done the right way  · Severe injury, such as a fall or car accident  · Osteoarthritis  · Pregnancy  · Infection of the joint  This condition is hard to diagnose. It may be confused with other causes of low back pain. To confirm the diagnosis, you may be given a shot of numbing medicine in the SI joint. Treatment includes rest, physical therapy, and anti-inflammatory medicines. If another health problem is the cause, then that must also be treated. More testing may be needed if your symptoms dont get better.  Home care  · If your healthcare provider has prescribed medicines, take all of them as directed.  · You may use over-the-counter pain medicine to control pain, unless another medicine was  prescribed. If prednisone was prescribed, dont use NSAIDs, or nonsteroidal anti-inflammatory drugs, such as ibuprofen or naproxen. Talk with your provider before using this medicine if you have chronic liver or kidney disease or ever had a stomach ulcer or GI bleeding.  · If you were referred to physical therapy, make an appointment. Be sure to do any prescribed exercises.  · Dont smoke. Smoking reduces blood flow to the inflamed area. This makes it harder to treat.  Follow-up care  Follow up with your healthcare provider, or as advised.  If you had an X-ray or an MRI, you will be notified of any new findings that may affect your care.  When to seek medical advice  Contact your healthcare provider right away if any of these occur:  · Increasing low back pain  · Inflammation of the eyes  · Skin rash or redness  · Weakness or numbness in one or both legs  · Loss of bowel or bladder control  · Numbness in the groin area  Date Last Reviewed: 11/24/2015  © 7328-4436 [employer]. [address] [place] [address], [place] [address]. All rights reserved. This information is not intended as a substitute for professional medical care. Always follow your healthcare professional's instructions.        Exercises to Strengthen Your Lower Back  Strong lower back and abdominal muscles work together to support your spine. The exercises below will help strengthen the lower back. It is important that you begin exercising slowly and increase levels gradually.  Always begin any exercise program with stretching. If you feel pain while doing any of these exercises, stop and talk to your doctor about a more specific exercise program that better suits your condition.   Low back stretch  The point of stretching is to make you more flexible and increase your range of motion. Stretch only as much as you are able. Stretch slowly. Do not push your stretch to the limit. If at any point you feel pain while stretching, this is your  (temporary) limit.  · Lie on your back with your knees bent and both feet on the ground.  · Slowly raise your left knee to your chest as you flatten your lower back against the floor. Hold for 5 seconds.  · Relax and repeat the exercise with your right knee.  · Do 10 of these exercises for each leg.  · Repeat hugging both knees to your chest at the same time.  Building lower back strength  Start your exercise routine with 10 to 30 minutes a day, 1 to 3 times a day.  Initial exercises  Lying on your back:  1. Ankle pumps: Move your foot up and down, towards your head, and then away. Repeat 10 times with each foot.  2. Heel slides: Slowly bend your knee, drawing the heel of your foot towards you. Then slide your heel/foot from you, straightening your knee. Do not lift your foot off the floor (this is not a leg lift).  3. Abdominal contraction: Bend your knees and put your hands on your stomach. Tighten your stomach muscles. Hold for 5 seconds, then relax. Repeat 10 times.  4. Straight leg raise: Bend one leg at the knee and keep the other leg straight. Tighten your stomach muscles. Slowly lift your straight leg 6 to 12 inches off the floor and hold for up to 5 seconds. Repeat 10 times on each side.  Standin. Wall squats: Stand with your back against the wall. Move your feet about 12 inches away from the wall. Tighten your stomach muscles, and slowly bend your knees until they are at about a 45 degree angle. Do not go down too far. Hold about 5 seconds. Then slowly return to your starting position. Repeat 10 times.  2. Heel raises: Stand facing the wall. Slowly raise the heels of your feet up and down, while keeping your toes on the floor. If you have trouble balancing, you can touch the wall with your hands. Repeat 10 times.  More advanced exercises  When you feel comfortable enough, try these exercises.  1. Kneeling lumbar extension: Begin on your hands and knees. At the same time, raise and straighten your  right arm and left leg until they are parallel to the ground. Hold for 2 seconds and come back slowly to a starting position. Repeat with left arm and right leg, alternating 10 times.  2. Prone lumbar extension: Lie face down, arms extended overhead, palms on the floor. At the same time, raise your right arm and left leg as high as comfortably possible. Hold for 10 seconds and slowly return to start. Repeat with left arm and right leg, alternating 10 times. Gradually build up to 20 times. (Advanced: Repeat this exercise raising both arms and both legs a few inches off the floor at the same time. Hold for 5 seconds and release.)  3. Pelvic tilt: Lie on the floor on your back with your knees bent at 90 degrees. Your feet should be flat on the floor. Inhale, exhale, then slowly contract your abdominal muscles bringing your navel toward your spine. Let your pelvis rock back until your lower back is flat on the floor. Hold for 10 seconds while breathing smoothly.  4. Abdominal crunch: Perform a pelvic tilt (above) flattening your lower back against the floor. Holding the tension in your abdominal muscles, take another breath and raise your shoulder blades off the ground (this is not a full sit-up). Keep your head in line with your body (dont bend your neck forward). Hold for 2 seconds, then slowly lower.  Date Last Reviewed: 6/1/2016 © 2000-2017 [employer]. [address] [place] [address]. All rights reserved. This information is not intended as a substitute for professional medical care. Always follow your healthcare professional's instructions.        Back Exercises: Lower Back Rotation    To start, lie on your back with your knees bent and feet flat on the floor. Dont press your neck or lower back to the floor. Breathe deeply. You should feel comfortable and relaxed in this position.  · Drop both knees to one side. Turn your head to the other side. Keep your shoulders flat on the  floor.  · Do not push through pain.  · Hold for 20 seconds.  · Slowly switch sides.  · Repeat 2 to 5 times.  Date Last Reviewed: 10/11/2015  © 5063-9973 [employer]. [address] [place] [address]. All rights reserved. This information is not intended as a substitute for professional medical care. Always follow your healthcare professional's instructions.        Back Exercises: Lower Back Stretch    To start, sit in a chair with your feet flat on the floor. Shift your weight slightly forward. Relax, and keep your ears, shoulders, and hips aligned.  · Sit with your feet well apart.  · Bend forward and touch the floor with the backs of your hands. Relax and let your body drop.  · Hold for 20 seconds. Return to starting position.  · Repeat 2 times.   Date Last Reviewed: 8/16/2015  © 2020-2014 [employer]. [address] [place] [address]. All rights reserved. This information is not intended as a substitute for professional medical care. Always follow your healthcare professional's instructions.        Back Exercises: Seated Rotation    To start, sit in a chair with your feet flat on the floor. Shift your weight slightly forward to avoid rounding your back. Relax, and keep your ears, shoulders, and hips aligned:  · Fold your arms and elbows just below shoulder height.  · Turn from the waist with hips forward. Turn your head last. Do not push through the pain.  · Hold for a count of 10 to 30. Return to starting position.  · Repeat 3 to 5 times on one side. Then switch sides.  Date Last Reviewed: 10/11/2015  © 5810-7456 [employer]. [address] [place] [address]. All rights reserved. This information is not intended as a substitute for professional medical care. Always follow your healthcare professional's instructions.        Lumbar Extension (Flexibility)    17. Lie face down on your stomach, forehead on the floor. You can lie on a mat or  towel.  18. Bend your arms next to your body and lift your upper body up on your forearms. Your palms and forearms should be flat on the floor. Keep your stomach and hips on the floor.  19. Hold your upper body up with your forearms for 20 seconds. Slowly lower back down to the floor.  20. Repeat 2 times, or as instructed.  Date Last Reviewed: 3/10/2016  © 1579-8393 [employer]. [address] [place] [address]. All rights reserved. This information is not intended as a substitute for professional medical care. Always follow your healthcare professional's instructions.        Lumbar Flexion (Flexibility)    21. Lie on your back on the floor, with your knees bent and your feet flat on the floor.  22. Gently pull your knees up toward your chest. Put your hands under your thighs to help pull your knees up.  23. Press your lower back down to the floor. Hold for 20 seconds.  24. Lower your legs back down to the floor and relax.  25. Repeat 2 times, or as instructed.  Date Last Reviewed: 3/10/2016  © 1679-7161 [employer]. [address] [place] [address]. All rights reserved. This information is not intended as a substitute for professional medical care. Always follow your healthcare professional's instructions.        Back Care Tips    Caring for your back  These are things you can do to prevent a recurrence of acute back pain and to reduce symptoms from chronic back pain:  · Maintain a healthy weight. If you are overweight, losing weight will help most types of back pain.  · Exercise is an important part of recovery from most types of back pain. The muscles behind and in front of the spine support the back. This means strengthening both the back muscles and the abdominal muscles will provide better support for your spine.   · Swimming and brisk walking are good overall exercises to improve your fitness level.  · Practice safe lifting methods (below).  · Practice good  posture when sitting, standing and walking. Avoid prolonged sitting. This puts more stress on the lower back than standing or walking.  · Wear quality shoes with sufficient arch support. Foot and ankle alignment can affect back symptoms. Women should avoid wearing high heels.  · Therapeutic massage can help relax the back muscles without stretching them.  · During the first 24 to 72 hours after an acute injury or flare-up of chronic back pain, apply an ice pack to the painful area for 20 minutes and then remove it for 20 minutes, over a period of 60 to 90 minutes, or several times a day. As a safety precaution, do not use a heating pad at bedtime. Sleeping on a heating pad can lead to skin burns or tissue damage.  · You can alternate ice and heat therapies.  Medications  Talk to your healthcare provider before using medicines, especially if you have other medical problems or are taking other medicines.  · You may use acetaminophen or ibuprofen to control pain, unless your healthcare provider prescribed other pain medicine. If you have chronic conditions like diabetes, liver or kidney disease, stomach ulcers, or gastrointestinal bleeding, or are taking blood thinners, talk with your healthcare provider before taking any medicines.  · Be careful if you are given prescription pain medicines, narcotics, or medicine for muscle spasm. They can cause drowsiness, affect your coordination, reflexes, and judgment. Do not drive or operate heavy machinery while taking these types of medicines. Take prescription pain medicine only as prescribed by your healthcare provider.  Lumbar stretch  Here is a simple stretching exercise that will help relax muscle spasm and keep your back more limber. If exercise makes your back pain worse, dont do it.  · Lie on your back with your knees bent and both feet on the ground.  · Slowly raise your left knee to your chest as you flatten your lower back against the floor. Hold for 5  seconds.  · Relax and repeat the exercise with your right knee.  · Do 10 of these exercises for each leg.  Safe lifting method  · Dont bend over at the waist to lift an object off the floor.  Instead, bend your knees and hips in a squat.   · Keep your back and head upright  · Hold the object close to your body, directly in front of you.  · Straighten your legs to lift the object.   · Lower the object to the floor in the reverse fashion.  · If you must slide something across the floor, push it.  Posture tips  Sitting  Sit in chairs with straight backs or low-back support. Keep your knees lower than your hips, with your feet flat on the floor.  When driving, sit up straight. Adjust the seat forward so you are not leaning toward the steering wheel.  A small pillow or rolled towel behind your lower back may help if you are driving long distances.   Standing  When standing for long periods, shift most of your weight to one leg at a time. Alternate legs every few minutes.   Sleeping  The best way to sleep is on your side with your knees bent. Put a low pillow under your head to support your neck in a neutral spine position. Avoid thick pillows that bend your neck to one side. Put a pillow between your legs to further relax your lower back. If you sleep on your back, put pillows under your knees to support your legs in a slightly flexed position. Use a firm mattress. If your mattress sags, replace it, or use a 1/2-inch plywood board under the mattress to add support.  Follow-up care  Follow up with your healthcare provider, or as advised.  If X-rays, a CT scan or an MRI scan were taken, they will be reviewed by a radiologist. You will be notified of any new findings that may affect your care.  Call 911  Seek emergency medical care if any of the following occur:  · Trouble breathing  · Confusion  · Very drowsy  · Fainting or loss of consciousness  · Rapid or very slow heart rate  · Loss of  bowel or bladder control  When  to seek medical care  Call your healthcare provider if any of the following occur:  · Pain becomes worse or spreads to your arms or legs  · Weakness or numbness in one or both arms or legs  · Numbness in the groin area  Date Last Reviewed: 6/1/2016  © 1985-5867 [employer]. [address] [place] [address]. All rights reserved. This information is not intended as a substitute for professional medical care. Always follow your healthcare professional's instructions.        Relieving Tension in Your Back  Being relaxed helps keep your mind healthy and your back ready to move. Take short breaks often. Walk around. Stretch. Switch tasks. Also give the following a try.  Make time to relax. Start by setting aside 5 minutes daily.   Deep breathing    Deep breathing is a simple way to reduce stress. You can do it almost any time you need to relax.  · Inhale slowly through your nose. Let your lungs and stomach expand.  · Hold your breath for 2 to 3 seconds.  · Exhale slowly through your mouth until your lungs feel empty. Repeat 3 to 4 times.  Relieve tension  Muscle tension can create tender spots called trigger points. The tips below may help relieve muscle tension.  · Press the trigger point if you can reach it. If not, lie on a soft tennis ball, or ask a friend to press the spot. Use steady pressure for 10 to 15 seconds. Breathe deeply. Repeat a few times.  · Massage trigger points with ice for 2 to 5 minutes. Press lightly at first. Slowly increase firmness.  Date Last Reviewed: 10/18/2015  © 4306-4232 [employer]. [address] [place] [id number]. All rights reserved. This information is not intended as a substitute for professional medical care. Always follow your healthcare professional's instructions.        Back Safety: Getting Into and Out of Bed  Good posture protects your back when you sit, stand, and walk. It is also important while getting into and out of bed. Follow  the steps below to get out of bed. Reverse them to get into bed.  Safety tip: Sit at the side of the bed for a few seconds before standing up. Then, after you stand up, wait a moment before walking to be sure youre not dizzy.      Roll onto your side.   1. Roll onto your side  · Keep your knees together.  · Flatten your stomach muscles to keep your back from arching.  · Put your hands on the bed in front of you.     Raise your body.   2. Raise your body  · Push your upper body off the bed as you swing your legs to the floor.  · Keeping your back straight, move your whole body as one unit. Dont bend or twist at the waist.  · Let the weight of your legs help you move.     Stand up.   3. Stand up  · Lean forward from your hip and roll onto the balls of your feet.  · Flatten your stomach muscles to keep your back from arching.  · Using your arm and leg muscles, push yourself to a standing position.  Date Last Reviewed: 8/31/2015 © 2000-2017 [employer]. [address] [place] [address], [place] [address]. All rights reserved. This information is not intended as a substitute for professional medical care. Always follow your healthcare professional's instructions.

## 2018-09-21 NOTE — PROGRESS NOTES
PM&R NEW PATIENT HISTORY & PHYSICAL :    Referring Physician: Molly    Chief Complaint   Patient presents with    Back Pain     HPI: This is a 57 y.o.  male being seen in clinic today for evaluation of back pain that began after falling out of a chair a few.  He has a history of cervical DJD/DDD, but when he fell he aggravated his head/neck as well. He feels achy pain and tightness across his neck and shoulders and achy, sharp pain in his low back with radiation into his post/lateral right leg.  With prolonged activity his symptoms worsen.  He denies weakness but admits to mild numbness/tingling into the right legs.  Medications from his PCP are providing minimal relief.     History obtained from patient    Functional History:  Walking: Not limited  Transfers: Independent  Assistive devices: No  Power mobility: No  Falls: None   Directional preference: change of position  Employment status: [employer]    Needs help with:  Nothing - all ADLS normal    Cooking   Cleaning  Bathing   Dressing   Toileting     Past family, medical, social, and surgical history reviewed in chart    Review of Systems:     General- denies lethargy, weight change, fever, chills  Head/neck- denies swallowing difficulties  ENT- denies hearing changes  Cardiovascular-denies chest pain  Pulmonary- denies shortness of breath  GI- denies constipation or bowel incontinence  - denies bladder incontinence  Skin- denies wounds or rashes  Musculoskeletal- denies weakness, +pain  Neurologic- +/-numbness and tingling  Psychiatric- +h/o depression, denies anxiety  Lymphatic-denies swelling  Endocrine- denies hypoglycemic symptoms/DM history  All other pertinent systems negative     Physical Examination:  General: Well developed, well nourished male, NAD  HEENT:NCAT EOMI bilaterally   Pulmonary:Normal respirations    Spinal Examination: CERVICAL  Active ROM is within normal limits except mild limitation at lateral rotation .  Inspection: No deformity  of spinal alignment.  Palpation: No vertebral tenderness to percussion.  Tight and tender bands at bilateral trapezius, ttp at splenius capitus  Spurling test: neg    Spinal Examination: LUMBAR or THORACIC  Active ROM is within normal limits.  Inspection: No deformity of spinal alignment.  No palpable olisthesis.  Palpation: No vertebral tenderness to percussion.  ttp at right si joint, gluteus musculature, mild tightness at paraspinals   NYDIA: positive on right  +facet loading bilaterally  SLR Test (seated and supine):positive on right with tight hamstrings  Able to stand on heels and toes    Musculoskeletal Tests:    Elbow compression (ulnar): neg  Tinels at wrist: neg  Phalen: neg    Bilateral Upper and Lower Extremities:  Pulses are 2+ at radial,  bilaterally.  Shoulder/Elbow/Wrist/Hand ROM wnl except mild rotator cuff weakness-worse on right  Hip/Knee/Ankle ROM wnl  Bilateral Extremities show normal capillary refill.  No signs of cyanosis, rubor, edema, skin changes, or dysvascular changes of appendages.  Nails appear intact.    Neurological Exam:  Cranial Nerves:  II-XII grossly intact    Manual Muscle Testing: (Motor 5=normal)    RIGHT Upper extremity: Shoulder abduction 5/5, Biceps 5/5, Triceps 5/5, Wrist extension 5/5, Abductor pollicis brevis 5/5, Ulnar hand intrinsics 5/5,  LEFT Upper extremity: Shoulder abduction 5/5, Biceps 5/5, Triceps 5/5, Wrist extension 5/5, Abductor pollicis brevis 5/5, Ulnar hand intrinsics 5/5,  RIGHT Lower extremity: Hip flexion 5/5, Hip Abduction 4+/5, Hip Adduction 4+/5, Knee extension 5/5, Knee flexion 5/5, Ankle dorsiflexion 5/5, Extensor hallucis longus 5/5, Ankle plantarflexion 5/5  LEFT Lower extremity:  Hip flexion 5/5, Hip Abduction 4+/5,Hip Adduction 4+/5, Knee extension 5/5, Knee flexion 5/5, Ankle dorsiflexion 5/5, Extensor hallucis longus 5/5, Ankle plantarflexion 5/5    No focal atrophy is noted of either upper or lower extremity.    Bilateral Reflexes:1+bic tric  br patellar  Perez's response is absent bilaterally.  No clonus at knee or ankle.    Sensation: tested to light touch  - intact in all four limbs.    Gait: Narrow base and good arm swing.      IMPRESSION/PLAN: This is a 57 y.o.  male with myofascial pain/cervical strain, right sacroiliitis, underlying cervical and lumbar DJD/DDD with right leg radiculitis (probable S1)    1. Rx for PT- myofascial release, core and cuff strengthening, stretch, ROM, light traction, modalities   2. Cont NSAID and tylenol prn, cont flexeril prn and gabapentin  3. Try topical compound from PAP  4. Reviewed cervical and lumbar xrays  5. Handouts on back and neck care, exercise, stretch, etc provided  6. If not improving, consider referral to IPM for si joint and ALLIE ben Cabrera M.D.  [facility]

## 2018-10-06 DIAGNOSIS — T14.8XXA MUSCLE STRAIN: ICD-10-CM

## 2018-10-06 DIAGNOSIS — M54.2 NECK PAIN: ICD-10-CM

## 2018-10-06 RX ORDER — CYCLOBENZAPRINE HCL 10 MG
TABLET ORAL
Qty: 30 TABLET | Refills: 0 | Status: SHIPPED | OUTPATIENT
Start: 2018-10-06 | End: 2022-01-28

## 2018-11-01 ENCOUNTER — OFFICE VISIT (OUTPATIENT)
Dept: PULMONOLOGY | Facility: CLINIC | Age: 58
End: 2018-11-01
Payer: COMMERCIAL

## 2018-11-01 VITALS
HEART RATE: 76 BPM | RESPIRATION RATE: 16 BRPM | OXYGEN SATURATION: 97 % | SYSTOLIC BLOOD PRESSURE: 112 MMHG | HEIGHT: 72 IN | BODY MASS INDEX: 27.8 KG/M2 | DIASTOLIC BLOOD PRESSURE: 76 MMHG | WEIGHT: 205.25 LBS

## 2018-11-01 DIAGNOSIS — G47.33 OSA ON CPAP: Primary | ICD-10-CM

## 2018-11-01 DIAGNOSIS — G47.33 OBSTRUCTIVE SLEEP APNEA SYNDROME: ICD-10-CM

## 2018-11-01 DIAGNOSIS — M25.50 ARTHRALGIA, UNSPECIFIED JOINT: ICD-10-CM

## 2018-11-01 DIAGNOSIS — G47.10 HYPERSOMNOLENCE: ICD-10-CM

## 2018-11-01 PROCEDURE — 99999 PR PBB SHADOW E&M-EST. PATIENT-LVL III: CPT | Mod: PBBFAC,,, | Performed by: INTERNAL MEDICINE

## 2018-11-01 PROCEDURE — 99214 OFFICE O/P EST MOD 30 MIN: CPT | Mod: S$GLB,,, | Performed by: INTERNAL MEDICINE

## 2018-11-01 RX ORDER — NAPROXEN 500 MG/1
500 TABLET ORAL NIGHTLY
Qty: 30 TABLET | Refills: 11 | Status: SHIPPED | OUTPATIENT
Start: 2018-11-01 | End: 2019-05-13

## 2018-11-01 NOTE — PROGRESS NOTES
Subjective:       Patient ID: Bradley Petty Jr. is a 57 y.o. male.    Chief Complaint: He       Sleep Apnea    HPI     EPWORTH 14  Still c/o daytime hypersomnolence  Download  - large leak on present mask  Advised to change to FFM with firm inner lining  He will obtain mask and repeat download    Mild arthritic complaints  - may be keeping him up at night    Past Medical History:   Diagnosis Date    Acid reflux     Arthralgia     Glaucoma     Hypertension     Sjogren's syndrome      Past Surgical History:   Procedure Laterality Date    BACK SURGERY       Social History     Socioeconomic History    Marital status: Single     Spouse name: Not on file    Number of children: Not on file    Years of education: Not on file    Highest education level: Not on file   Social Needs    Financial resource strain: Not on file    Food insecurity - worry: Not on file    Food insecurity - inability: Not on file    Transportation needs - medical: Not on file    Transportation needs - non-medical: Not on file   Occupational History     Employer: [employer]   Tobacco Use    Smoking status: Never Smoker    Smokeless tobacco: Never Used   Substance and Sexual Activity    Alcohol use: No    Drug use: No    Sexual activity: Yes     Partners: Female   Other Topics Concern    Not on file   Social History Narrative    Not on file     Review of Systems   Constitutional: Negative for fever and fatigue.   HENT: Negative for postnasal drip and rhinorrhea.    Eyes: Negative for redness and itching.   Respiratory: Positive for apnea. Negative for cough, shortness of breath, wheezing, dyspnea on extertion and Paroxysmal Nocturnal Dyspnea.    Cardiovascular: Negative for chest pain.   Genitourinary: Negative for difficulty urinating and hematuria.   Endocrine: Negative for polyphagia, cold intolerance and heat intolerance.    Musculoskeletal: Positive for arthralgias, back pain and myalgias.   Skin: Negative for rash.    Gastrointestinal: Negative for nausea, vomiting, abdominal pain and abdominal distention.   Neurological: Negative for dizziness and headaches.   Hematological: Negative for adenopathy. Does not bruise/bleed easily and no excessive bruising.   Psychiatric/Behavioral: Positive for sleep disturbance. The patient is not nervous/anxious.        Objective:      Physical Exam   Constitutional: He is oriented to person, place, and time. He appears well-developed and well-nourished.   HENT:   Head: Normocephalic and atraumatic.   Eyes: Conjunctivae are normal. Pupils are equal, round, and reactive to light.   Neck: Neck supple. No JVD present. No tracheal deviation present. No thyromegaly present.   Cardiovascular: Normal rate, regular rhythm and normal heart sounds.   Pulmonary/Chest: Effort normal and breath sounds normal.   Abdominal: Soft.   Musculoskeletal: Normal range of motion.   Lymphadenopathy:     He has no cervical adenopathy.   Neurological: He is alert and oriented to person, place, and time.   Skin: Skin is warm and dry.   Nursing note and vitals reviewed.    Personal Diagnostic Review  PSG: significant for severe Obstructive Sleep Apnea   Prior polysomnogram from 2012  Office Spirometry Results:      9/21/2018 - 10/20/2018  YOB: 1960  Mask:  Compliance Summary  9/21/2018 - 10/20/2018 (30 days)  Days with Device Usage 18 days  Days without Device Usage 12 days  Percent Days with Device Usage 60.0%  Cumulative Usage 3 days 2 hrs. 9 mins. 50 secs.  Maximum Usage (1 Day) 6 hrs. 54 mins. 24 secs.  Average Usage (All Days) 2 hrs. 28 mins. 19 secs.  Average Usage (Days Used) 4 hrs. 7 mins. 12 secs.  Minimum Usage (1 Day) 2 hrs. 15 mins. 55 secs.  Percent of Days with Usage >= 4 Hours 23.3%  Percent of Days with Usage < 4 Hours 76.7%  Date Range  Total Blower Time 3 days 3 hrs. 2 mins. 27 secs.  Average AHI 11.5  Auto-CPAP Summary  Auto-CPAP Mean Pressure 7.9 cmH2O  Auto-CPAP Peak Average Pressure  10.4 cmH2O  Average Device Pressure <= 90% of Time 10.3 cmH2O  Average Time in Large Leak Per Day 1 hrs. 17 secs.    No flowsheet data found.  Pulmonary Studies Review 11/1/2018   SpO2 97   Height 72.000   Weight 3283.97   BMI (Calculated) 27.9   Predicted Distance 441.84   Predicted Distance Meters (Calculated) 630.58         Assessment:       1. DA on CPAP    2. Obstructive sleep apnea syndrome    3. Arthralgia, unspecified joint    4. Hypersomnolence        Outpatient Encounter Medications as of 11/1/2018   Medication Sig Dispense Refill    acetaminophen (TYLENOL) 500 mg Cap Take 1,000 mg by mouth once daily.      b complex vitamins (VITAMIN B COMPLEX) tablet Take 1 tablet by mouth Daily.      cholecalciferol, vitamin D3, 5,000 unit capsule Take 5,000 Units by mouth Daily.      clotrimazole-betamethasone (LOTRISONE) lotion Apply topically 2 (two) times daily. 30 mL 0    cyanocobalamin, vitamin B-12, 5,000 mcg TbDL Take 1 tablet by mouth once daily.      cyclobenzaprine (FLEXERIL) 10 MG tablet TAKE 1/2 TO 1 TABLET AT BEDTIME AS NEEDED FOR MUSCLE SPASM 30 tablet 0    gabapentin (NEURONTIN) 300 MG capsule Take 1 capsule (300 mg total) by mouth every evening. 90 capsule 0    HERBAL DRUGS (COLON HERBAL CLEANSER ORAL) Take by mouth once daily.       hydroquinone, bulk, 98 % Powd Please compound Hydroquinone 8% cream. Apply to affected area twice a day x 3 months then skip a month      multivitamin capsule Take 1 capsule by mouth Daily.      sildenafil (REVATIO) 20 mg Tab One to 4 tablets daily prn sexual activity. Take 30 to 45 min prior to sexual activity. 60 tablet 1    TRUVADA 200-300 mg Tab Take 1 tablet by mouth once daily.       naproxen (NAPROSYN) 500 MG tablet Take 1 tablet (500 mg total) by mouth every evening. 30 tablet 11     No facility-administered encounter medications on file as of 11/1/2018.      Orders Placed This Encounter   Procedures    CPAP/BIPAP SUPPLIES     Mirage Quattro Full  Face CPAP Mask with Headgear  or  ComfortGel Blue Full Face CPAP Mask with Headgear     Order Specific Question:   Type of mask:     Answer:   FFM     Order Specific Question:   Headgear?     Answer:   Yes     Order Specific Question:   Tubing?     Answer:   Yes     Order Specific Question:   Humidifier chamber?     Answer:   Yes     Order Specific Question:   Chin strap?     Answer:   Yes     Order Specific Question:   Filters?     Answer:   Yes     Order Specific Question:   Other supplies:     Answer:   Give 90 day supply with 4 refills     Order Specific Question:   Length of need (1-99 months):     Answer:   99     Plan:       Requested Prescriptions     Signed Prescriptions Disp Refills    naproxen (NAPROSYN) 500 MG tablet 30 tablet 11     Sig: Take 1 tablet (500 mg total) by mouth every evening.     DA on CPAP  -     CPAP/BIPAP SUPPLIES    Obstructive sleep apnea syndrome    Arthralgia, unspecified joint  -     naproxen (NAPROSYN) 500 MG tablet; Take 1 tablet (500 mg total) by mouth every evening.  Dispense: 30 tablet; Refill: 11    Hypersomnolence           Follow-up in about 5 weeks (around 12/6/2018).    MEDICAL DECISION MAKING: Moderate to high complexity.  Overall, the multiple problems listed are of moderate to high severity that may impact quality of life and activities of daily living. Side effects of medications, treatment plan as well as options and alternatives reviewed and discussed with patient. There was counseling of patient concerning these issues.    Total time spent in face to face counseling and coordination of care - 30  minutes over 50% of time was used in discussion of prognosis, risks, benefits of treatment, instructions and compliance with regimen . Discussion with other physicians or health care providers (DME, NP, pharmacy, respiratory therapy) occurred.

## 2018-11-01 NOTE — PATIENT INSTRUCTIONS
ComfortGel Blue Full Face CPAP Mask with Headgear    Mirage Quattro Full Face CPAP Mask with Headgear    Naproxen and naproxen sodium oral immediate-release tablets  What is this medicine?  NAPROXEN (na PROX en) is a non-steroidal anti-inflammatory drug (NSAID). It is used to reduce swelling and to treat pain. This medicine may be used for dental pain, headache, or painful monthly periods. It is also used for painful joint and muscular problems such as arthritis, tendinitis, bursitis, and gout.  How should I use this medicine?  Take this medicine by mouth with a glass of water. Follow the directions on the prescription label. Take it with food if your stomach gets upset. Try to not lie down for at least 10 minutes after you take it. Take your medicine at regular intervals. Do not take your medicine more often than directed. Long-term, continuous use may increase the risk of heart attack or stroke.  A special MedGuide will be given to you by the pharmacist with each prescription and refill. Be sure to read this information carefully each time.  Talk to your pediatrician regarding the use of this medicine in children. Special care may be needed.  What side effects may I notice from receiving this medicine?  Side effects that you should report to your doctor or health care professional as soon as possible:  · black or bloody stools, blood in the urine or vomit  · blurred vision  · chest pain  · difficulty breathing or wheezing  · nausea or vomiting  · severe stomach pain  · skin rash, skin redness, blistering or peeling skin, hives, or itching  · slurred speech or weakness on one side of the body  · swelling of eyelids, throat, lips  · unexplained weight gain or swelling  · unusually weak or tired  · yellowing of eyes or skin  Side effects that usually do not require medical attention (report to your doctor or health care professional if they continue or are bothersome):  · constipation  · headache  · heartburn  What  may interact with this medicine?  · alcohol  · aspirin  · cidofovir  · diuretics  · lithium  · methotrexate  · other drugs for inflammation like ketorolac or prednisone  · pemetrexed  · probenecid  · warfarin  What if I miss a dose?  If you miss a dose, take it as soon as you can. If it is almost time for your next dose, take only that dose. Do not take double or extra doses.  Where should I keep my medicine?  Keep out of the reach of children.  Store at room temperature between 15 and 30 degrees C (59 and 86 degrees F). Keep container tightly closed. Throw away any unused medicine after the expiration date.  What should I tell my health care provider before I take this medicine?  They need to know if you have any of these conditions:  · asthma  · cigarette smoker  · drink more than 3 alcohol containing drinks a day  · heart disease or circulation problems such as heart failure or leg edema (fluid retention)  · high blood pressure  · kidney disease  · liver disease  · stomach bleeding or ulcers  · an unusual or allergic reaction to naproxen, aspirin, other NSAIDs, other medicines, foods, dyes, or preservatives  · pregnant or trying to get pregnant  · breast-feeding  What should I watch for while using this medicine?  Tell your doctor or health care professional if your pain does not get better. Talk to your doctor before taking another medicine for pain. Do not treat yourself.  This medicine does not prevent heart attack or stroke. In fact, this medicine may increase the chance of a heart attack or stroke. The chance may increase with longer use of this medicine and in people who have heart disease. If you take aspirin to prevent heart attack or stroke, talk with your doctor or health care professional.  Do not take other medicines that contain aspirin, ibuprofen, or naproxen with this medicine. Side effects such as stomach upset, nausea, or ulcers may be more likely to occur. Many medicines available without a  prescription should not be taken with this medicine.  This medicine can cause ulcers and bleeding in the stomach and intestines at any time during treatment. Do not smoke cigarettes or drink alcohol. These increase irritation to your stomach and can make it more susceptible to damage from this medicine. Ulcers and bleeding can happen without warning symptoms and can cause death.  You may get drowsy or dizzy. Do not drive, use machinery, or do anything that needs mental alertness until you know how this medicine affects you. Do not stand or sit up quickly, especially if you are an older patient. This reduces the risk of dizzy or fainting spells.  This medicine can cause you to bleed more easily. Try to avoid damage to your teeth and gums when you brush or floss your teeth.  NOTE:This sheet is a summary. It may not cover all possible information. If you have questions about this medicine, talk to your doctor, pharmacist, or health care provider. Copyright© 2017 [employer]

## 2018-11-08 ENCOUNTER — IMMUNIZATION (OUTPATIENT)
Dept: INTERNAL MEDICINE | Facility: CLINIC | Age: 58
End: 2018-11-08
Payer: COMMERCIAL

## 2018-11-08 PROCEDURE — 99999 PR PBB SHADOW E&M-EST. PATIENT-LVL II: CPT | Mod: PBBFAC,,,

## 2018-11-08 PROCEDURE — 90471 IMMUNIZATION ADMIN: CPT | Mod: S$GLB,,, | Performed by: INTERNAL MEDICINE

## 2018-11-08 PROCEDURE — 90686 IIV4 VACC NO PRSV 0.5 ML IM: CPT | Mod: S$GLB,,, | Performed by: INTERNAL MEDICINE

## 2019-02-04 ENCOUNTER — OFFICE VISIT (OUTPATIENT)
Dept: INTERNAL MEDICINE | Facility: CLINIC | Age: 59
End: 2019-02-04
Payer: COMMERCIAL

## 2019-02-04 VITALS
OXYGEN SATURATION: 98 % | WEIGHT: 226.44 LBS | HEART RATE: 96 BPM | TEMPERATURE: 98 F | DIASTOLIC BLOOD PRESSURE: 74 MMHG | SYSTOLIC BLOOD PRESSURE: 120 MMHG | BODY MASS INDEX: 30.71 KG/M2

## 2019-02-04 DIAGNOSIS — J32.9 SINUSITIS, UNSPECIFIED CHRONICITY, UNSPECIFIED LOCATION: Primary | ICD-10-CM

## 2019-02-04 PROCEDURE — 99999 PR PBB SHADOW E&M-EST. PATIENT-LVL III: ICD-10-PCS | Mod: PBBFAC,,, | Performed by: INTERNAL MEDICINE

## 2019-02-04 PROCEDURE — 99214 OFFICE O/P EST MOD 30 MIN: CPT | Mod: S$GLB,,, | Performed by: INTERNAL MEDICINE

## 2019-02-04 PROCEDURE — 99999 PR PBB SHADOW E&M-EST. PATIENT-LVL III: CPT | Mod: PBBFAC,,, | Performed by: INTERNAL MEDICINE

## 2019-02-04 PROCEDURE — 99214 PR OFFICE/OUTPT VISIT, EST, LEVL IV, 30-39 MIN: ICD-10-PCS | Mod: S$GLB,,, | Performed by: INTERNAL MEDICINE

## 2019-02-04 RX ORDER — METHYLPREDNISOLONE 4 MG/1
TABLET ORAL
Qty: 1 PACKAGE | Refills: 0 | Status: SHIPPED | OUTPATIENT
Start: 2019-02-04 | End: 2019-05-13

## 2019-02-04 RX ORDER — AMOXICILLIN AND CLAVULANATE POTASSIUM 875; 125 MG/1; MG/1
1 TABLET, FILM COATED ORAL 2 TIMES DAILY
Qty: 20 TABLET | Refills: 0 | Status: SHIPPED | OUTPATIENT
Start: 2019-02-04 | End: 2019-02-14

## 2019-02-04 RX ORDER — FLUTICASONE PROPIONATE 50 MCG
2 SPRAY, SUSPENSION (ML) NASAL DAILY
Qty: 16 G | Refills: 1 | Status: SHIPPED | OUTPATIENT
Start: 2019-02-04 | End: 2019-03-06

## 2019-02-05 NOTE — PROGRESS NOTES
Subjective:      Patient ID: Bradley Petty Jr. is a 58 y.o. male.    Chief Complaint: Nasal Congestion    Sinus Problem   This is a new problem. The current episode started 1 to 4 weeks ago. The problem has been gradually worsening since onset. There has been no fever. The pain is mild. Associated symptoms include congestion and sinus pressure. Pertinent negatives include no chills, coughing, diaphoresis, ear pain, hoarse voice, neck pain, shortness of breath, sneezing, sore throat or swollen glands. Treatments tried: saline nasal spray. The treatment provided no relief.      57 yo with   Patient Active Problem List   Diagnosis    Leukocytopenia, unspecified    Lumbago    Unspecified polyarthropathy or polyarthritis, site unspecified    Primary Sjogren's syndrome    Fibromyalgia    Depressed    Pain in left shoulder    Cervical radiculitis    Primary open-angle glaucoma(365.11)    Family history of glaucoma    Refractive error    DA on CPAP    Obesity (BMI 30-39.9)    Postinflammatory hyperpigmentation    Hypersomnolence     Past Medical History:   Diagnosis Date    Acid reflux     Arthralgia     Glaucoma     Hypertension     Sjogren's syndrome        Here today c/o sinus problem    Review of Systems   Constitutional: Negative for chills, diaphoresis and fever.   HENT: Positive for congestion and sinus pressure. Negative for ear pain, hoarse voice, sneezing and sore throat.    Respiratory: Negative for cough and shortness of breath.    Cardiovascular: Negative for chest pain.   Gastrointestinal: Negative for abdominal pain and blood in stool.   Genitourinary: Negative for dysuria and hematuria.   Musculoskeletal: Negative for neck pain.   Neurological: Negative for seizures and syncope.     Objective:   /74 (BP Location: Right arm, Patient Position: Sitting, BP Method: Large (Manual))   Pulse 96   Temp 97.5 °F (36.4 °C) (Tympanic)   Wt 102.7 kg (226 lb 6.6 oz)   SpO2 98%   BMI  30.71 kg/m²     Physical Exam   Constitutional: He appears well-developed and well-nourished. No distress.   HENT:   Right Ear: Tympanic membrane normal.   Left Ear: Tympanic membrane normal.   Nose: Mucosal edema and rhinorrhea present. Right sinus exhibits no maxillary sinus tenderness and no frontal sinus tenderness. Left sinus exhibits no maxillary sinus tenderness and no frontal sinus tenderness.   Mouth/Throat: Posterior oropharyngeal erythema present. No oropharyngeal exudate or posterior oropharyngeal edema.   Cardiovascular: Normal rate.   Pulmonary/Chest: Effort normal and breath sounds normal.   Skin: Skin is warm and dry.   Psychiatric: He has a normal mood and affect. His behavior is normal.       Assessment:     1. Sinusitis, unspecified chronicity, unspecified location      Plan:   Sinusitis, unspecified chronicity, unspecified location  -     amoxicillin-clavulanate 875-125mg (AUGMENTIN) 875-125 mg per tablet; Take 1 tablet by mouth 2 (two) times daily. for 10 days  Dispense: 20 tablet; Refill: 0  -     fluticasone (FLONASE) 50 mcg/actuation nasal spray; 2 sprays (100 mcg total) by Each Nare route once daily. For nasal congestion or runny nose  Dispense: 16 g; Refill: 1  -     methylPREDNISolone (MEDROL DOSEPACK) 4 mg tablet; use as directed  Dispense: 1 Package; Refill: 0        Lab Frequency Next Occurrence   X-Ray Chest PA And Lateral Once 07/23/2018       Problem List Items Addressed This Visit     None      Visit Diagnoses     Sinusitis, unspecified chronicity, unspecified location    -  Primary    Relevant Medications    amoxicillin-clavulanate 875-125mg (AUGMENTIN) 875-125 mg per tablet    fluticasone (FLONASE) 50 mcg/actuation nasal spray    methylPREDNISolone (MEDROL DOSEPACK) 4 mg tablet          Follow-up if symptoms worsen or fail to improve.

## 2019-04-18 DIAGNOSIS — Z20.6 HIV EXPOSURE: Primary | ICD-10-CM

## 2019-05-13 ENCOUNTER — OFFICE VISIT (OUTPATIENT)
Dept: INTERNAL MEDICINE | Facility: CLINIC | Age: 59
End: 2019-05-13
Payer: COMMERCIAL

## 2019-05-13 ENCOUNTER — HOSPITAL ENCOUNTER (OUTPATIENT)
Dept: RADIOLOGY | Facility: HOSPITAL | Age: 59
Discharge: HOME | End: 2019-05-13
Attending: INTERNAL MEDICINE
Payer: COMMERCIAL

## 2019-05-13 VITALS
DIASTOLIC BLOOD PRESSURE: 86 MMHG | BODY MASS INDEX: 31.72 KG/M2 | WEIGHT: 233.94 LBS | OXYGEN SATURATION: 98 % | HEART RATE: 99 BPM | SYSTOLIC BLOOD PRESSURE: 120 MMHG | TEMPERATURE: 100 F

## 2019-05-13 DIAGNOSIS — R05.9 COUGH: ICD-10-CM

## 2019-05-13 DIAGNOSIS — R05.9 COUGH: Primary | ICD-10-CM

## 2019-05-13 DIAGNOSIS — R50.9 FEVER, UNSPECIFIED FEVER CAUSE: ICD-10-CM

## 2019-05-13 DIAGNOSIS — J32.9 SINUSITIS, UNSPECIFIED CHRONICITY, UNSPECIFIED LOCATION: ICD-10-CM

## 2019-05-13 LAB
INFLUENZA A, MOLECULAR: NEGATIVE
INFLUENZA B, MOLECULAR: NEGATIVE
SPECIMEN SOURCE: NORMAL

## 2019-05-13 PROCEDURE — 87502 INFLUENZA DNA AMP PROBE: CPT

## 2019-05-13 PROCEDURE — 71046 XR CHEST PA AND LATERAL: ICD-10-PCS | Mod: 26,,, | Performed by: RADIOLOGY

## 2019-05-13 PROCEDURE — 99214 OFFICE O/P EST MOD 30 MIN: CPT | Mod: S$GLB,,, | Performed by: INTERNAL MEDICINE

## 2019-05-13 PROCEDURE — 99999 PR PBB SHADOW E&M-EST. PATIENT-LVL III: CPT | Mod: PBBFAC,,, | Performed by: INTERNAL MEDICINE

## 2019-05-13 PROCEDURE — 99214 PR OFFICE/OUTPT VISIT, EST, LEVL IV, 30-39 MIN: ICD-10-PCS | Mod: S$GLB,,, | Performed by: INTERNAL MEDICINE

## 2019-05-13 PROCEDURE — 71046 X-RAY EXAM CHEST 2 VIEWS: CPT | Mod: TC

## 2019-05-13 PROCEDURE — 71046 X-RAY EXAM CHEST 2 VIEWS: CPT | Mod: 26,,, | Performed by: RADIOLOGY

## 2019-05-13 PROCEDURE — 99999 PR PBB SHADOW E&M-EST. PATIENT-LVL III: ICD-10-PCS | Mod: PBBFAC,,, | Performed by: INTERNAL MEDICINE

## 2019-05-13 RX ORDER — FLUTICASONE PROPIONATE 50 MCG
2 SPRAY, SUSPENSION (ML) NASAL DAILY
Qty: 16 G | Refills: 1 | Status: SHIPPED | OUTPATIENT
Start: 2019-05-13 | End: 2019-06-12

## 2019-05-13 RX ORDER — AMOXICILLIN AND CLAVULANATE POTASSIUM 875; 125 MG/1; MG/1
1 TABLET, FILM COATED ORAL 2 TIMES DAILY
Qty: 20 TABLET | Refills: 0 | Status: SHIPPED | OUTPATIENT
Start: 2019-05-13 | End: 2019-05-23

## 2019-05-13 RX ORDER — BENZONATATE 200 MG/1
200 CAPSULE ORAL 3 TIMES DAILY PRN
Qty: 30 CAPSULE | Refills: 0 | Status: SHIPPED | OUTPATIENT
Start: 2019-05-13 | End: 2019-05-23

## 2019-05-13 RX ORDER — PROMETHAZINE HYDROCHLORIDE AND DEXTROMETHORPHAN HYDROBROMIDE 6.25; 15 MG/5ML; MG/5ML
5 SYRUP ORAL NIGHTLY PRN
Qty: 180 ML | Refills: 0 | Status: SHIPPED | OUTPATIENT
Start: 2019-05-13 | End: 2022-01-28

## 2019-05-13 RX ORDER — METHYLPREDNISOLONE 4 MG/1
TABLET ORAL
Qty: 1 PACKAGE | Refills: 0 | Status: SHIPPED | OUTPATIENT
Start: 2019-05-13 | End: 2022-01-28

## 2019-05-13 RX ORDER — IBUPROFEN 800 MG/1
800 TABLET ORAL EVERY 8 HOURS
Qty: 30 TABLET | Refills: 0 | Status: SHIPPED | OUTPATIENT
Start: 2019-05-13 | End: 2022-01-28

## 2019-05-13 NOTE — PROGRESS NOTES
Subjective:      Patient ID: Bradley Petty Jr. is a 58 y.o. male.    Chief Complaint: Cough    HPI   57 yo with   Body aches, chills, fatigue x one week.   Coughing for about one week.   yellow mucous from throat.   Yellow nasal d/c  Has tried only tylenol. No improvement.     Dark watery diarrhea starting 2 days ago.  Stool thicker today but still loose and dark.   No foreign travel.   Nauseated but no emesis.     Mother with coughing symptoms. .      Review of Systems   Constitutional: Negative for chills and fever.   HENT: Positive for congestion, postnasal drip, rhinorrhea and sinus pressure. Negative for ear pain, nosebleeds and sore throat.    Respiratory: Positive for cough. Negative for wheezing.    Cardiovascular: Negative for chest pain, palpitations and leg swelling.   Gastrointestinal: Positive for diarrhea. Negative for abdominal distention, abdominal pain, anal bleeding, blood in stool, constipation and vomiting.   Genitourinary: Negative for dysuria and hematuria.   Musculoskeletal: Positive for arthralgias and myalgias.   Skin: Negative for rash and wound.   Neurological: Negative for seizures and syncope.     Objective:   /86 (BP Location: Right arm, Patient Position: Sitting, BP Method: Large (Manual))   Pulse 99   Temp (!) 100.4 °F (38 °C) (Tympanic)   Wt 106.1 kg (233 lb 14.5 oz)   SpO2 98%   BMI 31.72 kg/m²     Physical Exam   Constitutional: He is oriented to person, place, and time. He appears well-developed and well-nourished. No distress.   HENT:   Head: Normocephalic and atraumatic.   Right Ear: Tympanic membrane normal.   Left Ear: Tympanic membrane normal.   Nose: Mucosal edema and rhinorrhea present. Right sinus exhibits maxillary sinus tenderness and frontal sinus tenderness. Left sinus exhibits frontal sinus tenderness. Left sinus exhibits no maxillary sinus tenderness.   Mouth/Throat: Posterior oropharyngeal erythema present. No oropharyngeal exudate or posterior  oropharyngeal edema.   Eyes: Pupils are equal, round, and reactive to light. EOM are normal.   Neck: Neck supple.   Cardiovascular: Normal rate and regular rhythm.   Pulmonary/Chest: Breath sounds normal. He has no wheezes. He has no rales.   Abdominal: Soft. Bowel sounds are normal. There is no tenderness.   Musculoskeletal: He exhibits no edema.   Lymphadenopathy:     He has no cervical adenopathy.   Neurological: He is alert and oriented to person, place, and time.   Skin: Skin is warm and dry.   Psychiatric: He has a normal mood and affect. His behavior is normal.       Assessment:     1. Cough    2. Fever, unspecified fever cause    3. Sinusitis, unspecified chronicity, unspecified location      Plan:   Cough  -     Influenza A & B by Molecular  -     CBC auto differential; Future; Expected date: 05/13/2019  -     X-Ray Chest PA And Lateral; Future; Expected date: 05/13/2019  -     promethazine-dextromethorphan (PROMETHAZINE-DM) 6.25-15 mg/5 mL Syrp; Take 5 mLs by mouth nightly as needed (cough).  Dispense: 180 mL; Refill: 0  -     benzonatate (TESSALON) 200 MG capsule; Take 1 capsule (200 mg total) by mouth 3 (three) times daily as needed for Cough.  Dispense: 30 capsule; Refill: 0    Fever, unspecified fever cause  -     Influenza A & B by Molecular  -     CBC auto differential; Future; Expected date: 05/13/2019  -     X-Ray Chest PA And Lateral; Future; Expected date: 05/13/2019  -     Basic metabolic panel; Future; Expected date: 05/13/2019  -     ibuprofen (ADVIL,MOTRIN) 800 MG tablet; Take 1 tablet (800 mg total) by mouth every 8 (eight) hours. As needed for pain or fever above 100.4  Dispense: 30 tablet; Refill: 0    Sinusitis, unspecified chronicity, unspecified location  -     amoxicillin-clavulanate 875-125mg (AUGMENTIN) 875-125 mg per tablet; Take 1 tablet by mouth 2 (two) times daily. for 10 days  Dispense: 20 tablet; Refill: 0  -     methylPREDNISolone (MEDROL, LINA,) 4 mg tablet; use as directed   Dispense: 1 Package; Refill: 0  -     fluticasone propionate (FLONASE) 50 mcg/actuation nasal spray; 2 sprays (100 mcg total) by Each Nare route once daily. For nasal congestion or runny nose  Dispense: 16 g; Refill: 1    ER precautions discussed    Lab Frequency Next Occurrence   X-Ray Chest PA And Lateral Once 07/23/2018   COMPREHENSIVE METABOLIC PANEL Once 04/18/2019   CBC W/ AUTO DIFFERENTIAL Once 04/18/2019   HIV 1/2 Ag/Ab (4th Gen)         Problem List Items Addressed This Visit     None      Visit Diagnoses     Cough    -  Primary    Relevant Medications    promethazine-dextromethorphan (PROMETHAZINE-DM) 6.25-15 mg/5 mL Syrp    benzonatate (TESSALON) 200 MG capsule    Other Relevant Orders    Influenza A & B by Molecular (Completed)    CBC auto differential    X-Ray Chest PA And Lateral    Fever, unspecified fever cause        Relevant Medications    ibuprofen (ADVIL,MOTRIN) 800 MG tablet    Other Relevant Orders    Influenza A & B by Molecular (Completed)    CBC auto differential    X-Ray Chest PA And Lateral    Basic metabolic panel    Sinusitis, unspecified chronicity, unspecified location        Relevant Medications    amoxicillin-clavulanate 875-125mg (AUGMENTIN) 875-125 mg per tablet    methylPREDNISolone (MEDROL, LINA,) 4 mg tablet    fluticasone propionate (FLONASE) 50 mcg/actuation nasal spray          Follow up if symptoms worsen or fail to improve.

## 2019-05-13 NOTE — PATIENT INSTRUCTIONS
Tylenol 500 to 1000 mg every 8 hours as needed for fever or pain alternating with ibuprofen 800 mg every 8 hours as needed for fever or pain.

## 2019-05-14 ENCOUNTER — TELEPHONE (OUTPATIENT)
Dept: INTERNAL MEDICINE | Facility: CLINIC | Age: 59
End: 2019-05-14

## 2019-05-14 DIAGNOSIS — N28.9 RENAL INSUFFICIENCY: Primary | ICD-10-CM

## 2019-05-14 NOTE — TELEPHONE ENCOUNTER
Pt requesting lab results.  Notified pt that blood work is still in process but that flu test was negative and chest x-ray appears clear.  Notified pt that Dr. Barnes will send his blood work results to his [facility] account when the results come in.  Pt verbalized understanding and stated he feels much better than he did yesterday.

## 2019-05-14 NOTE — TELEPHONE ENCOUNTER
----- Message from Keesha Rojas sent at 5/14/2019  2:16 PM CDT -----  Contact: Patient   Type:  Test Results    Who Called: Bradley  Name of Test (Lab/Mammo/Etc): X-rays and labs  Date of Test: Monday 5/13/19  Ordering Provider: Dr. Barnes  Where the test was performed: [facility]  Would the patient rather a call back or a response via [facility]? Call back  Best Call Back Number: Please call him at [phone]  Additional Information:  n/a

## 2019-05-15 ENCOUNTER — TELEPHONE (OUTPATIENT)
Dept: INTERNAL MEDICINE | Facility: CLINIC | Age: 59
End: 2019-05-15

## 2019-05-15 NOTE — TELEPHONE ENCOUNTER
----- Message from Yudy Quinn sent at 5/15/2019  2:57 PM CDT -----  Contact: pt  .Type:  Patient Returning Call    Who Called: Pt  Who Left Message for Patient: Korina  Does the patient know what this is regarding?: return call   Would the patient rather a call back or a response via [facility]? Call   Best Call Back Number: [phone] (home)   Additional Information:

## 2019-05-15 NOTE — TELEPHONE ENCOUNTER
Please let patient know that there is a mild decrease in his kidney function.  Possibly due to dehydration related to current illness.  Make sure to continue to drink plenty of water.  Recommend recheck BMP in 1 week.

## 2019-05-15 NOTE — TELEPHONE ENCOUNTER
Conveyed recent test result and recommendation from Dr. Barnes.  Pt verbalized understanding and requested lab order be sent to [facility].  Lab order sent to [facility] electronically.

## 2020-03-03 ENCOUNTER — PATIENT OUTREACH (OUTPATIENT)
Dept: ADMINISTRATIVE | Facility: OTHER | Age: 60
End: 2020-03-03

## 2020-03-05 ENCOUNTER — OFFICE VISIT (OUTPATIENT)
Dept: INFECTIOUS DISEASES | Facility: CLINIC | Age: 60
End: 2020-03-05
Payer: COMMERCIAL

## 2020-03-05 VITALS
TEMPERATURE: 99 F | WEIGHT: 275.44 LBS | SYSTOLIC BLOOD PRESSURE: 142 MMHG | DIASTOLIC BLOOD PRESSURE: 92 MMHG | BODY MASS INDEX: 37.36 KG/M2 | HEART RATE: 80 BPM

## 2020-03-05 DIAGNOSIS — Z79.899 ON PRE-EXPOSURE PROPHYLAXIS FOR HIV: ICD-10-CM

## 2020-03-05 DIAGNOSIS — F32.A DEPRESSION, UNSPECIFIED DEPRESSION TYPE: ICD-10-CM

## 2020-03-05 PROCEDURE — 99999 PR PBB SHADOW E&M-EST. PATIENT-LVL III: CPT | Mod: PBBFAC,,, | Performed by: INTERNAL MEDICINE

## 2020-03-05 PROCEDURE — 99999 PR PBB SHADOW E&M-EST. PATIENT-LVL III: ICD-10-PCS | Mod: PBBFAC,,, | Performed by: INTERNAL MEDICINE

## 2020-03-05 PROCEDURE — 99203 PR OFFICE/OUTPT VISIT, NEW, LEVL III, 30-44 MIN: ICD-10-PCS | Mod: S$GLB,,, | Performed by: INTERNAL MEDICINE

## 2020-03-05 PROCEDURE — 99203 OFFICE O/P NEW LOW 30 MIN: CPT | Mod: S$GLB,,, | Performed by: INTERNAL MEDICINE

## 2020-03-05 RX ORDER — EMTRICITABINE AND TENOFOVIR DISOPROXIL FUMARATE 200; 300 MG/1; MG/1
1 TABLET, FILM COATED ORAL DAILY
Qty: 30 TABLET | Refills: 4 | Status: SHIPPED | OUTPATIENT
Start: 2020-03-05 | End: 2020-06-04

## 2020-03-05 NOTE — PROGRESS NOTES
Subjective:       Patient ID: Bradley Petty Jr. is a 59 y.o. male.    Chief Complaint: No chief complaint on file.    HPI   57 year old man who wants  HIV PREP - he was not able to talk to his MD about it .  He has male sexual orientation .He was started on Truvada during the last visit in 2018.      Review of Systems   Constitutional: Negative for activity change, appetite change, chills, diaphoresis and fatigue.   HENT: Negative for congestion, dental problem, ear discharge, ear pain and facial swelling.    Eyes: Negative for pain, discharge and itching.   Respiratory: Negative for apnea, cough, chest tightness and shortness of breath.    Cardiovascular: Negative for chest pain and leg swelling.   Gastrointestinal: Negative for abdominal distention and abdominal pain.   Endocrine: Negative for cold intolerance, heat intolerance and polydipsia.   Genitourinary: Negative for difficulty urinating, dysuria and enuresis.   Musculoskeletal: Negative for arthralgias and back pain.   Skin: Negative for color change and pallor.   Allergic/Immunologic: Negative for environmental allergies and food allergies.   Neurological: Negative for dizziness, facial asymmetry, light-headedness and headaches.   Hematological: Negative for adenopathy. Does not bruise/bleed easily.   Psychiatric/Behavioral: Negative for agitation and behavioral problems.       Objective:      Physical Exam   Constitutional: He is oriented to person, place, and time. He appears well-developed and well-nourished.   HENT:   Head: Normocephalic and atraumatic.   Nose: Nose normal.   Eyes: EOM are normal.   PERRL   Neck: Normal range of motion. Neck supple.   Cardiovascular: Normal rate, regular rhythm and normal heart sounds.   Pulmonary/Chest: Effort normal and breath sounds normal. No respiratory distress. He has no wheezes. He has no rales.   Abdominal: There is no tenderness.   Musculoskeletal: Normal range of motion. He exhibits no edema.    Neurological: He is alert and oriented to person, place, and time.   Skin: Skin is dry.   Nursing note and vitals reviewed.      Assessment:       1. On pre-exposure prophylaxis for HIV         Plan:       Problem List Items Addressed This Visit     Depressed     Advised to see someone for depression          On pre-exposure prophylaxis for HIV     Will continue Truvada .  HIV assay - 2/19/2020- negative.  Counseled on safe sexual practices.

## 2020-10-06 ENCOUNTER — PATIENT MESSAGE (OUTPATIENT)
Dept: ADMINISTRATIVE | Facility: HOSPITAL | Age: 60
End: 2020-10-06

## 2020-11-13 ENCOUNTER — TELEPHONE (OUTPATIENT)
Dept: INFECTIOUS DISEASES | Facility: CLINIC | Age: 60
End: 2020-11-13

## 2020-11-13 RX ORDER — EMTRICITABINE AND TENOFOVIR DISOPROXIL FUMARATE 200; 300 MG/1; MG/1
1 TABLET, FILM COATED ORAL DAILY
Qty: 90 TABLET | Refills: 1 | Status: SHIPPED | OUTPATIENT
Start: 2020-11-13 | End: 2022-01-19

## 2020-11-13 NOTE — TELEPHONE ENCOUNTER
Pt call for refill.  Needs 90 days supply and also needs to be brand name only so he can get it for free.  Pt has an appt on 11/30   Yes

## 2021-02-09 ENCOUNTER — IMMUNIZATION (OUTPATIENT)
Dept: FAMILY MEDICINE | Facility: CLINIC | Age: 61
End: 2021-02-09
Payer: COMMERCIAL

## 2021-02-09 DIAGNOSIS — Z23 NEED FOR VACCINATION: Primary | ICD-10-CM

## 2021-02-09 PROCEDURE — 91300 COVID-19, MRNA, LNP-S, PF, 30 MCG/0.3 ML DOSE VACCINE: CPT | Mod: PBBFAC | Performed by: FAMILY MEDICINE

## 2021-03-02 ENCOUNTER — IMMUNIZATION (OUTPATIENT)
Dept: FAMILY MEDICINE | Facility: CLINIC | Age: 61
End: 2021-03-02
Payer: COMMERCIAL

## 2021-03-02 DIAGNOSIS — Z23 NEED FOR VACCINATION: Primary | ICD-10-CM

## 2021-03-02 PROCEDURE — 91300 COVID-19, MRNA, LNP-S, PF, 30 MCG/0.3 ML DOSE VACCINE: CPT | Mod: PBBFAC | Performed by: NURSE PRACTITIONER

## 2021-03-02 PROCEDURE — 0002A COVID-19, MRNA, LNP-S, PF, 30 MCG/0.3 ML DOSE VACCINE: CPT | Mod: PBBFAC | Performed by: NURSE PRACTITIONER

## 2021-03-25 ENCOUNTER — PATIENT MESSAGE (OUTPATIENT)
Dept: ADMINISTRATIVE | Facility: HOSPITAL | Age: 61
End: 2021-03-25

## 2021-09-29 ENCOUNTER — IMMUNIZATION (OUTPATIENT)
Dept: FAMILY MEDICINE | Facility: CLINIC | Age: 61
End: 2021-09-29
Payer: COMMERCIAL

## 2021-09-29 DIAGNOSIS — Z23 NEED FOR VACCINATION: Primary | ICD-10-CM

## 2021-09-29 PROCEDURE — 0003A COVID-19, MRNA, LNP-S, PF, 30 MCG/0.3 ML DOSE VACCINE: CPT | Mod: PBBFAC | Performed by: FAMILY MEDICINE

## 2021-09-29 PROCEDURE — 91300 COVID-19, MRNA, LNP-S, PF, 30 MCG/0.3 ML DOSE VACCINE: CPT | Mod: PBBFAC | Performed by: FAMILY MEDICINE

## 2021-10-13 ENCOUNTER — IMMUNIZATION (OUTPATIENT)
Dept: INTERNAL MEDICINE | Facility: CLINIC | Age: 61
End: 2021-10-13
Payer: COMMERCIAL

## 2021-10-13 PROCEDURE — 90471 FLU VACCINE (QUAD) GREATER THAN OR EQUAL TO 3YO PRESERVATIVE FREE IM: ICD-10-PCS | Mod: S$GLB,,, | Performed by: PEDIATRICS

## 2021-10-13 PROCEDURE — 90471 IMMUNIZATION ADMIN: CPT | Mod: S$GLB,,, | Performed by: PEDIATRICS

## 2021-10-13 PROCEDURE — 90686 FLU VACCINE (QUAD) GREATER THAN OR EQUAL TO 3YO PRESERVATIVE FREE IM: ICD-10-PCS | Mod: S$GLB,,, | Performed by: PEDIATRICS

## 2021-10-13 PROCEDURE — 90686 IIV4 VACC NO PRSV 0.5 ML IM: CPT | Mod: S$GLB,,, | Performed by: PEDIATRICS

## 2021-12-14 RX ORDER — EMTRICITABINE AND TENOFOVIR DISOPROXIL FUMARATE 200; 300 MG/1; MG/1
1 TABLET, FILM COATED ORAL DAILY
Qty: 30 TABLET | Refills: 0 | Status: SHIPPED | OUTPATIENT
Start: 2021-12-14 | End: 2021-12-15 | Stop reason: SDUPTHER

## 2021-12-15 RX ORDER — EMTRICITABINE AND TENOFOVIR DISOPROXIL FUMARATE 200; 300 MG/1; MG/1
1 TABLET, FILM COATED ORAL DAILY
Qty: 30 TABLET | Refills: 0 | Status: SHIPPED | OUTPATIENT
Start: 2021-12-15 | End: 2022-01-14

## 2022-01-28 ENCOUNTER — OFFICE VISIT (OUTPATIENT)
Dept: INTERNAL MEDICINE | Facility: CLINIC | Age: 62
End: 2022-01-28
Payer: COMMERCIAL

## 2022-01-28 VITALS
RESPIRATION RATE: 18 BRPM | SYSTOLIC BLOOD PRESSURE: 150 MMHG | HEIGHT: 72 IN | DIASTOLIC BLOOD PRESSURE: 94 MMHG | OXYGEN SATURATION: 96 % | BODY MASS INDEX: 34.55 KG/M2 | TEMPERATURE: 98 F | HEART RATE: 93 BPM | WEIGHT: 255.06 LBS

## 2022-01-28 DIAGNOSIS — M54.41 ACUTE BILATERAL LOW BACK PAIN WITH RIGHT-SIDED SCIATICA: Primary | ICD-10-CM

## 2022-01-28 PROCEDURE — 99214 PR OFFICE/OUTPT VISIT, EST, LEVL IV, 30-39 MIN: ICD-10-PCS | Mod: S$GLB,,, | Performed by: INTERNAL MEDICINE

## 2022-01-28 PROCEDURE — 99999 PR PBB SHADOW E&M-EST. PATIENT-LVL IV: CPT | Mod: PBBFAC,,, | Performed by: INTERNAL MEDICINE

## 2022-01-28 PROCEDURE — 99999 PR PBB SHADOW E&M-EST. PATIENT-LVL IV: ICD-10-PCS | Mod: PBBFAC,,, | Performed by: INTERNAL MEDICINE

## 2022-01-28 PROCEDURE — 99214 OFFICE O/P EST MOD 30 MIN: CPT | Mod: S$GLB,,, | Performed by: INTERNAL MEDICINE

## 2022-01-28 RX ORDER — IBUPROFEN 800 MG/1
800 TABLET ORAL EVERY 8 HOURS PRN
Qty: 30 TABLET | Refills: 0 | Status: SHIPPED | OUTPATIENT
Start: 2022-01-28 | End: 2022-02-07

## 2022-01-28 RX ORDER — CYCLOBENZAPRINE HCL 10 MG
10 TABLET ORAL 3 TIMES DAILY PRN
Qty: 30 TABLET | Refills: 0 | Status: SHIPPED | OUTPATIENT
Start: 2022-01-28 | End: 2022-02-07

## 2022-01-28 NOTE — PATIENT INSTRUCTIONS
"Patient Education       Low Back Pain in Adults   The Basics   Written by the doctors and editors at [facility]   How worried should I be about low back pain? -- Do not assume the worst. Almost everyone gets back pain at some point. Low back pain can be scary. But even when the pain is severe, it usually goes away on its own within a few weeks. The cases that require urgent care or surgery are rare.  See your doctor or nurse if you have back pain and you:  · Recently had a fall or an injury to your back  · Have numbness or weakness in your legs  · Have problems with bladder or bowel control  · Have unexplained weight loss  · Have a fever or feel sick in other ways  · Take steroid medicine, such as prednisone, on a regular basis  · Have diabetes or a medical problem that weakens your immune system  · Have a history of cancer or osteoporosis  You should also see a doctor if:  · Your back pain is so severe that you cannot perform simple tasks  · Your back pain does not start to improve within 4 weeks   What are the parts of the back? -- The back is made up of (figure 1):  · Vertebrae - A stack of bones that sit on top of one another like a stack of coins. Each of these bones has a hole in the center. When stacked, the bones form a hollow tube that protects the spinal cord.  · Discs - Rubbery discs sit in between each of the vertebrae to add cushion and allow movement.  · Spinal cord and nerves - The spinal cord is the highway of nerves that connects the brain to the rest of the body. It runs through the vertebrae within the spinal canal. Nerves branch from the spinal cord and pass in between the vertebrae. From there they connect to the arms, the legs, and the organs. (This is why problems in the back can cause leg pain or bladder or bowel problems.)  · Muscles, tendons, and ligaments - Together the muscles, tendons, and ligaments are called the "soft tissues" of the back. These soft tissues support the back and help " "hold it together.  What causes low back pain? -- Many different things can cause low back pain. Most of the time, doctors do not know the exact cause.  Back pain can happen if you strain a muscle. This is often what has happened when a person "throws out" their back. This refers to pain that starts suddenly after physical activity, like lifting something heavy or bending the back.  Back pain can also happen if you have:  · Damaged, bulging, or torn discs  · Arthritis affecting the joints of the spine  · Bony growths on the vertebrae that crowd nearby nerves  · A vertebra out of place  · Narrowing in the spinal canal  · A tumor or infection (but this is very rare)  Should I get an imaging test? -- Most people do not need an imaging test such an X-ray, CT scan, or MRI. Most cases of back pain go away a few weeks. Doctors usually do not order imaging tests unless there are signs of something unusual.  If your doctor does not order an imaging test, do not worry. They can still learn a lot about your pain just from looking you over and talking with you.  How can the doctor or nurse tell what is wrong just by talking to me? -- Your symptoms tell your doctor or nurse a lot about the cause of your pain. For example:  · If your pain started after you did something specific, like lifting a heavy object or twisting your back, you might have strained a muscle.  · If your pain spreads down the back of one thigh, it could be a sign that one of the nerves that go to your leg is being pinched by a bulging or torn disc.  · If your pain goes all the way down both legs, it could be a sign that you have a narrowed spinal canal. This is most often due to bony growths on your spine.  How is back pain treated? -- Most people with an episode of low back pain do not have a serious medical problem, and can try simple treatments such as:  · Staying active - The best thing you can do is to stay as active as possible. People with low back pain " "recover faster if they stay active. If your pain is severe, you might need to rest for a day or 2. But it's important to get back to walking and moving as soon as possible. While you should avoid heavy lifting and sports while your back hurts, try to keep doing your normal daily activities.  · Heat - Some people find that it helps to use a heating pad or heated wrap. Be careful to avoid high heat settings to prevent skin burns.  · Medicines - First, you can try pain medicines that you can get without a prescription. In many cases, doctors suggest first trying a nonsteroidal antiinflammatory drug, or "NSAID." NSAIDs include ibuprofen (sample brand names: Advil, Motrin) and naproxen (sample brand name: Aleve). These might work better than acetaminophen (Tylenol) for back pain.  If non-prescription medicines do not help, let your doctor or nurse know. In some cases, doctors prescribe a medicine to relax the muscles (called a "muscle relaxant"). But keep in mind that muscle relaxants are not generally used in people older than 65. In older people, these medicines can cause side effects such as trouble urinating or confusion.  · Treatments to help with symptoms - Some treatments might help you feel better for a little while. They include:  ? Spinal manipulation - This is when a chiropractor, physical therapist, or other professional moves or "adjusts" the joints of your back. If you want to try this, talk to your doctor or nurse first.  ? Acupuncture - This is when someone who knows traditional Chinese medicine inserts tiny needles into your body to block pain signals.  ? Massage  While back pain usually goes away within a few weeks, some people do continue to have pain for longer. In this case, additional treatments might include:  · Self care - This involves being aware of your pain. While you should rest when you need to, it's important to stay active as much as you can. Things like applying heat and doing gentle " "stretches can help you feel better, too.  · Physical therapy - A physical therapist is an exercise expert who can teach you stretches and movements to help strengthen your muscles. The goal is to relieve pain but also help you get back to your normal activities.   Exercises you can try include walking, swimming, or using an exercise bike. Some people also find that Anthony Chi or yoga can help with their back pain. Finding activities you enjoy can help you stay active.  · Reducing stress - Some people find that it helps to try something called "mindfulness-based stress reduction." This involves going to a group program to practice relaxation and meditation. If your back pain is making you feel anxious or depressed, talk to your doctor or nurse. There are other treatments that can help with these problems.  Some people wonder if injections (shots) can help to relieve back pain. In some cases, doctors might recommend a shot of medicine to numb the area or reduce swelling. But this has only been proven to work in specific situations.  Only a small number of people end up needing surgery to treat back pain.  What can I do to keep from getting back pain again? -- The best thing you can do is to stay active. Doing exercises to strengthen and stretch your back can help. You can also:  · Learn to lift using your legs instead of your back  · Avoid sitting or standing in the same position for too long  Having back pain can be frustrating and scary. But it can help to know that doing these things can lower your risk of having another episode.  All topics are updated as new evidence becomes available and our peer review process is complete.  This topic retrieved from [employer] on: Sep 21, 2021.  Topic 39303 Version 18.0  Release: 29.4.2 - C29.263  © 2021 UpToDate, Inc. and/or its affiliates. All rights reserved.  figure 1: Anatomy of the back     Low back pain can be caused by problems with the muscles, ligaments, discs, bones " (vertebrae), or nerves. Often, back pain is caused by strains or sprains involving the muscles or ligaments. These problems cannot always be seen on imaging tests, such as MRI or CT scans.  Graphic 24194 Version 5.0    Consumer Information Use and Disclaimer   This information is not specific medical advice and does not replace information you receive from your health care provider. This is only a brief summary of general information. It does NOT include all information about conditions, illnesses, injuries, tests, procedures, treatments, therapies, discharge instructions or life-style choices that may apply to you. You must talk with your health care provider for complete information about your health and treatment options. This information should not be used to decide whether or not to accept your health care provider's advice, instructions or recommendations. Only your health care provider has the knowledge and training to provide advice that is right for you. The use of this information is governed by the [employer] End User License Agreement, available at https://www.Melinta/en/solutions/Saqina/about/kerry.The use of [employer] content is governed by the [employer] Terms of Use. ©2021 UpToDate, Inc. All rights reserved.  Copyright   © 2021 UpToDate, Inc. and/or its affiliates. All rights reserved.  Patient Education       Motor Vehicle Accident   About this topic   A motor vehicle accident can cause minor or very serious injuries. You may have minor injuries, like cuts or bruises. Other times, you may have more severe injuries like brain damage, broken bones, bleeding, or harm to organs inside your body. You can have injuries from your seat belt or if the airbag is deployed. An accident can lead to shock from blood loss. The blood loss may cause confusion, disoriented feelings, body system shut down, or even death.  If you have severe injuries, you will most often need emergency care at the scene of the  accident. Staff will work to make sure you are breathing and have a pulse. They will help control bleeding. You may need IV fluids, drugs, and other treatments. Then, you may be taken to the hospital emergency room.  Doctors and nurses will treat you right away when you get to the hospital. You may need more IV fluids, drugs, or a blood transfusion. You may need emergency surgery. After treating your severe injuries, the doctors will treat your other injuries. You may go to the intensive care room or have to stay in the hospital based on your condition. This will allow the staff to watch you closely in case your condition changes.  How long it takes for you to heal from a motor vehicle accident will vary based on how:  · Serious the injuries  · Quickly care is given  · You respond to care     What are the causes?   Your chances of being seriously injured in a motor vehicle crash are higher if you are:  · Sitting in the front seat  · Not wearing a seatbelt  · Thrown from the vehicle  · Hit by the vehicle  What can make this more likely to happen?   · Use of illegal drugs and alcohol abuse  · Poor weather conditions  · Falling asleep or driving when tired  · Driving too fast  · Distracted driving  What are the main signs?   · Pain and soreness from wounds, cuts, or bruising  · Major injuries like bleeding, broken bones, or not able to move  · Signs of shock like feeling cold, faint, dizzy, or sleepy  · Problems breathing  · Signs of a head injury like throwing up, headache, confusion, feeling disoriented, or not responding  How does the doctor diagnose this health problem?   At the hospital, the doctors will ask about your health history, the cause of the accident, and if you were wearing your seat belt. They will also want to know if the airbag deployed. The doctor will do an exam and will check your:  · Airway, breathing, and blood flow  · Level of alertness  · Senses and reflexes  · Damage and deformities to  bones  · Wounds, burns, cuts, bruises, and bleeding  · Pain and swelling  · Changes in speech, actions, and recall  The doctor may order:  · Lab tests  · X-rays  · CT or MRI scan  · Ultrasound  How does the doctor treat this health problem?   The doctor will treat your injuries and make a plan for care based on how badly you are hurt. Care needs may change as your condition changes and as rehab needs become more clear.  Are there other health problems to treat?   · Infection ? When germs enter the site of injury or surgery. An infection can slow healing and may spread to other parts of the body.  · Blood clots ? Cause pain and may break loose and travel to block blood flow to the heart, lungs, or brain  · Mental and emotional problems ? Changes in behavior and issues with coping. May also include post-traumatic stress disorder also called PTSD.  What lifestyle changes are needed?   Lifestyle may be different after a motor vehicle crash. You may need rehab care for a long time. Some people do not fully recover from accidents.  What drugs may be needed?   The doctor may order drugs to:  · Help with pain and swelling  · Ease muscle spasms  · Control nerve activity  · Prevent infection  · Prevent blood clots  What problems could happen?   · Long-term pain  · Mood changes  · Low blood pressure  · Infection  · Blood clots  · Disability  · Mental and emotional problems  What can be done to prevent this health problem?   There are no specific ways to prevent motor vehicle accidents. Ways you can help to stay safe are:  · Always wear a seat belt. Drive safely. Obey speed limits. Do not drink and drive.  · Do not allow children younger than 13 years old to ride in the front seat.  · Drivers should sit at least 10 to 12 inches (25 to 30 cm) away from the steering wheel.  · Passengers should sit as far back from the dash as possible.  · Place children in the proper safety seat.  · Avoid distractions while driving. Do not text  or talk on the phone while driving.  · Take breaks and rest periods so you do not get drowsy when driving.  · Take extra care when in high-risk conditions:  ? Rain, snow, or bad weather  ? Traffic  ? Late at night  Where can I learn more?   Centers for Disease Control and Prevention  https://www.cdc.gov/motorvehiclesafety/   [employer] [place] [employer]  http://www.nigms.nih.gov/Education/Factsheet_Trauma.htm   Last Reviewed Date   2021-05-05  Consumer Information Use and Disclaimer   This information is not specific medical advice and does not replace information you receive from your health care provider. This is only a brief summary of general information. It does NOT include all information about conditions, illnesses, injuries, tests, procedures, treatments, therapies, discharge instructions or life-style choices that may apply to you. You must talk with your health care provider for complete information about your health and treatment options. This information should not be used to decide whether or not to accept your health care providers advice, instructions or recommendations. Only your health care provider has the knowledge and training to provide advice that is right for you.  Copyright   Copyright © 2021 UpToDate, Inc. and its affiliates and/or licensors. All rights reserved.

## 2022-01-28 NOTE — PROGRESS NOTES
Subjective:       Patient ID: Bradley Petty Jr. is a 61 y.o. male.    Chief Complaint: Back Pain    New patient to me.     Back Pain  This is a new problem. The current episode started in the past 7 days. The problem occurs intermittently. The problem has been waxing and waning since onset. The pain is present in the lumbar spine. The quality of the pain is described as aching and burning. The pain is moderate. The symptoms are aggravated by position. Associated symptoms include leg pain, numbness, paresthesias and tingling. Pertinent negatives include no fever or weakness. Risk factors include recent trauma. He has tried analgesics (Tylenol) for the symptoms. The treatment provided no relief.     Review of Systems   Constitutional: Negative for fever.   Musculoskeletal: Positive for arthralgias, back pain and leg pain.   Neurological: Positive for tingling, numbness and paresthesias. Negative for weakness.         Objective:      Physical Exam  Vitals reviewed.   Constitutional:       General: He is not in acute distress.     Appearance: He is well-developed and well-nourished. He is not ill-appearing.   Pulmonary:      Effort: Pulmonary effort is normal. No respiratory distress.   Musculoskeletal:      Lumbar back: Tenderness present. No swelling, edema, signs of trauma or bony tenderness.      Right lower leg: No edema.      Left lower leg: No edema.   Skin:     General: Skin is warm and dry.   Neurological:      Mental Status: He is alert and oriented to person, place, and time.      Sensory: Sensation is intact.      Motor: Motor function is intact. No weakness.   Psychiatric:         Mood and Affect: Mood and affect normal.         Behavior: Behavior normal.         Thought Content: Thought content normal.         Judgment: Judgment normal.         Assessment:       Problem List Items Addressed This Visit     Lumbago - Primary    Relevant Medications    ibuprofen (ADVIL,MOTRIN) 800 MG tablet     cyclobenzaprine (FLEXERIL) 10 MG tablet          Plan:       Bradley was seen today for back pain.    Diagnoses and all orders for this visit:    Acute bilateral low back pain with right-sided sciatica  -     ibuprofen (ADVIL,MOTRIN) 800 MG tablet; Take 1 tablet (800 mg total) by mouth every 8 (eight) hours as needed for Pain.  -     cyclobenzaprine (FLEXERIL) 10 MG tablet; Take 1 tablet (10 mg total) by mouth 3 (three) times daily as needed for Muscle spasms.  -     Recommend heat application  -     Handout provided    RTC if symptoms worsen or fail to resolve with treatment.     He plans to return to establish care.

## 2022-02-07 ENCOUNTER — TELEPHONE (OUTPATIENT)
Dept: INFECTIOUS DISEASES | Facility: CLINIC | Age: 62
End: 2022-02-07
Payer: COMMERCIAL

## 2022-02-07 NOTE — TELEPHONE ENCOUNTER
----- Message from Ofelia George sent at 2/7/2022  8:07 AM CST -----  Contact: Bradley  Patient is calling to speak with the nurse regarding upcoming 1/9/22 appointment. Patient is unsure if lab orders are needed. Patient is requesting a call back at [phone] to clarify if labs are needed and schedule possibly.  Thanks,   RP

## 2022-02-09 ENCOUNTER — LAB VISIT (OUTPATIENT)
Dept: LAB | Facility: HOSPITAL | Age: 62
End: 2022-02-09
Attending: INTERNAL MEDICINE
Payer: COMMERCIAL

## 2022-02-09 ENCOUNTER — OFFICE VISIT (OUTPATIENT)
Dept: INFECTIOUS DISEASES | Facility: CLINIC | Age: 62
End: 2022-02-09
Payer: COMMERCIAL

## 2022-02-09 VITALS
DIASTOLIC BLOOD PRESSURE: 89 MMHG | HEIGHT: 72 IN | HEART RATE: 89 BPM | SYSTOLIC BLOOD PRESSURE: 172 MMHG | BODY MASS INDEX: 34.64 KG/M2 | WEIGHT: 255.75 LBS

## 2022-02-09 DIAGNOSIS — Z79.899 ON PRE-EXPOSURE PROPHYLAXIS FOR HIV: ICD-10-CM

## 2022-02-09 DIAGNOSIS — Z79.899 ON PRE-EXPOSURE PROPHYLAXIS FOR HIV: Primary | ICD-10-CM

## 2022-02-09 DIAGNOSIS — G47.33 OSA ON CPAP: ICD-10-CM

## 2022-02-09 LAB
ANION GAP SERPL CALC-SCNC: 7 MMOL/L (ref 8–16)
BASOPHILS # BLD AUTO: 0.03 K/UL (ref 0–0.2)
BASOPHILS NFR BLD: 0.7 % (ref 0–1.9)
BUN SERPL-MCNC: 14 MG/DL (ref 6–20)
CALCIUM SERPL-MCNC: 9.9 MG/DL (ref 8.7–10.5)
CHLORIDE SERPL-SCNC: 100 MMOL/L (ref 95–110)
CO2 SERPL-SCNC: 27 MMOL/L (ref 23–29)
CREAT SERPL-MCNC: 1.4 MG/DL (ref 0.5–1.4)
DIFFERENTIAL METHOD: ABNORMAL
EOSINOPHIL # BLD AUTO: 0.1 K/UL (ref 0–0.5)
EOSINOPHIL NFR BLD: 2.4 % (ref 0–8)
ERYTHROCYTE [DISTWIDTH] IN BLOOD BY AUTOMATED COUNT: 12.4 % (ref 11.5–14.5)
EST. GFR  (AFRICAN AMERICAN): >60 ML/MIN/1.73 M^2
EST. GFR  (NON AFRICAN AMERICAN): 54.2 ML/MIN/1.73 M^2
GLUCOSE SERPL-MCNC: 99 MG/DL (ref 70–110)
HCT VFR BLD AUTO: 42.9 % (ref 40–54)
HGB BLD-MCNC: 14.3 G/DL (ref 14–18)
IMM GRANULOCYTES # BLD AUTO: 0 K/UL (ref 0–0.04)
IMM GRANULOCYTES NFR BLD AUTO: 0 % (ref 0–0.5)
LYMPHOCYTES # BLD AUTO: 2 K/UL (ref 1–4.8)
LYMPHOCYTES NFR BLD: 47.8 % (ref 18–48)
MCH RBC QN AUTO: 32.1 PG (ref 27–31)
MCHC RBC AUTO-ENTMCNC: 33.3 G/DL (ref 32–36)
MCV RBC AUTO: 96 FL (ref 82–98)
MONOCYTES # BLD AUTO: 0.3 K/UL (ref 0.3–1)
MONOCYTES NFR BLD: 8.2 % (ref 4–15)
NEUTROPHILS # BLD AUTO: 1.7 K/UL (ref 1.8–7.7)
NEUTROPHILS NFR BLD: 40.9 % (ref 38–73)
NRBC BLD-RTO: 0 /100 WBC
PLATELET # BLD AUTO: 232 K/UL (ref 150–450)
PMV BLD AUTO: 10.1 FL (ref 9.2–12.9)
POTASSIUM SERPL-SCNC: 4.3 MMOL/L (ref 3.5–5.1)
RBC # BLD AUTO: 4.45 M/UL (ref 4.6–6.2)
SODIUM SERPL-SCNC: 134 MMOL/L (ref 136–145)
WBC # BLD AUTO: 4.16 K/UL (ref 3.9–12.7)

## 2022-02-09 PROCEDURE — 99214 PR OFFICE/OUTPT VISIT, EST, LEVL IV, 30-39 MIN: ICD-10-PCS | Mod: S$GLB,,, | Performed by: INTERNAL MEDICINE

## 2022-02-09 PROCEDURE — 99999 PR PBB SHADOW E&M-EST. PATIENT-LVL III: ICD-10-PCS | Mod: PBBFAC,,, | Performed by: INTERNAL MEDICINE

## 2022-02-09 PROCEDURE — 85025 COMPLETE CBC W/AUTO DIFF WBC: CPT | Performed by: INTERNAL MEDICINE

## 2022-02-09 PROCEDURE — 87389 HIV-1 AG W/HIV-1&-2 AB AG IA: CPT | Performed by: INTERNAL MEDICINE

## 2022-02-09 PROCEDURE — 80048 BASIC METABOLIC PNL TOTAL CA: CPT | Performed by: INTERNAL MEDICINE

## 2022-02-09 PROCEDURE — 99214 OFFICE O/P EST MOD 30 MIN: CPT | Mod: S$GLB,,, | Performed by: INTERNAL MEDICINE

## 2022-02-09 PROCEDURE — 99999 PR PBB SHADOW E&M-EST. PATIENT-LVL III: CPT | Mod: PBBFAC,,, | Performed by: INTERNAL MEDICINE

## 2022-02-09 PROCEDURE — 36415 COLL VENOUS BLD VENIPUNCTURE: CPT | Performed by: INTERNAL MEDICINE

## 2022-02-09 NOTE — ASSESSMENT & PLAN NOTE
Will do BMP  And HIv antibody .  He wants the order to go to an affordable place based on his insurance information

## 2022-02-09 NOTE — PROGRESS NOTES
Subjective:       Patient ID: Bradley Petty Jr. is a 61 y.o. male.    Chief Complaint: HIV    HPI 61 year old man on [employer] for PREP.  He reports compliance with therapy .  He has not done labs for a year .  He goes to get tested every 4 months at labs in [place].  He goes to areas that do not ask for his info in [facility].  The name of the place-        Review of Systems   Constitutional: Negative for activity change, appetite change, chills, diaphoresis and fatigue.   HENT: Negative for nasal congestion, dental problem, ear discharge, ear pain and facial swelling.    Eyes: Negative for pain, discharge and itching.   Respiratory: Negative for apnea, cough, chest tightness and shortness of breath.    Cardiovascular: Negative for chest pain and leg swelling.   Gastrointestinal: Negative for abdominal distention and abdominal pain.   Endocrine: Negative for cold intolerance, heat intolerance and polydipsia.   Genitourinary: Negative for difficulty urinating, dysuria and enuresis.   Musculoskeletal: Negative for arthralgias and back pain.   Integumentary:  Negative for color change and pallor.   Allergic/Immunologic: Negative for environmental allergies and food allergies.   Neurological: Negative for dizziness, facial asymmetry, light-headedness and headaches.   Hematological: Negative for adenopathy. Does not bruise/bleed easily.   Psychiatric/Behavioral: Negative for agitation and behavioral problems.         Objective:      Physical Exam  Vitals and nursing note reviewed.   Constitutional:       Appearance: He is well-developed.   HENT:      Head: Normocephalic and atraumatic.      Nose: Nose normal.   Eyes:      Comments: PERRL   Cardiovascular:      Rate and Rhythm: Normal rate and regular rhythm.      Heart sounds: Normal heart sounds.   Pulmonary:      Effort: Pulmonary effort is normal. No respiratory distress.      Breath sounds: Normal breath sounds. No wheezing or rales.   Abdominal:      Tenderness:  There is no abdominal tenderness.   Musculoskeletal:         General: Normal range of motion.      Cervical back: Normal range of motion and neck supple.   Skin:     General: Skin is dry.   Neurological:      Mental Status: He is alert and oriented to person, place, and time.         Assessment:       1. On pre-exposure prophylaxis for HIV  HIV 1/2 Ag/Ab (4th Gen)    Basic Metabolic Panel    CBC Auto Differential   2. DA on CPAP          Plan:       Problem List Items Addressed This Visit     DA on CPAP     CPAP as tolerated         On pre-exposure prophylaxis for HIV - Primary     Will do BMP  And HIv antibody .  He wants the order to go to an affordable place based on his insurance information          Relevant Orders    HIV 1/2 Ag/Ab (4th Gen)    Basic Metabolic Panel    CBC Auto Differential

## 2022-02-10 LAB — HIV 1+2 AB+HIV1 P24 AG SERPL QL IA: NEGATIVE

## 2022-02-18 ENCOUNTER — TELEPHONE (OUTPATIENT)
Dept: INTERNAL MEDICINE | Facility: CLINIC | Age: 62
End: 2022-02-18
Payer: COMMERCIAL

## 2022-02-18 DIAGNOSIS — M54.41 ACUTE BILATERAL LOW BACK PAIN WITH RIGHT-SIDED SCIATICA: Primary | ICD-10-CM

## 2022-02-18 NOTE — TELEPHONE ENCOUNTER
----- Message from Anjali Zuleta sent at 2/18/2022  1:00 PM CST -----  Contact: Patient @ [phone]  Patient would like to get medical advice.  Symptoms (please be specific):  Back Pain  How long have you had these symptoms: 2 weeks   Would you like a call back, or a response through your [facility] portal?:  call   Pharmacy name and phone # (copy from chart):  [id number] - [place] - [address] AT [facility]  [address] [address]  Phone: [phone] Fax: [phone]        Comments:

## 2022-02-18 NOTE — TELEPHONE ENCOUNTER
Pt stated he is still having back issues. Medications not working and not getting better. Pt would like to know what are his next options?

## 2022-02-23 DIAGNOSIS — D84.9 IMMUNOSUPPRESSED STATUS: ICD-10-CM

## 2022-03-09 ENCOUNTER — CLINICAL SUPPORT (OUTPATIENT)
Dept: REHABILITATION | Facility: HOSPITAL | Age: 62
End: 2022-03-09
Payer: COMMERCIAL

## 2022-03-09 DIAGNOSIS — M53.86 DECREASED ROM OF LUMBAR SPINE: Primary | ICD-10-CM

## 2022-03-09 DIAGNOSIS — M54.41 ACUTE BILATERAL LOW BACK PAIN WITH RIGHT-SIDED SCIATICA: ICD-10-CM

## 2022-03-09 PROCEDURE — 97161 PT EVAL LOW COMPLEX 20 MIN: CPT
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
## 2022-03-16 ENCOUNTER — CLINICAL SUPPORT (OUTPATIENT)
Dept: REHABILITATION | Facility: HOSPITAL | Age: 62
End: 2022-03-16
Payer: COMMERCIAL

## 2022-03-16 DIAGNOSIS — M53.86 DECREASED ROM OF LUMBAR SPINE: ICD-10-CM

## 2022-03-16 DIAGNOSIS — R29.898 WEAKNESS OF BOTH LOWER EXTREMITIES: ICD-10-CM

## 2022-03-16 PROCEDURE — 97110 THERAPEUTIC EXERCISES: CPT

## 2022-03-16 PROCEDURE — 97140 MANUAL THERAPY 1/> REGIONS: CPT

## 2022-03-16 NOTE — PROGRESS NOTES
[facility]   Physical Therapy Treatment Note     Name: Bradley Petty Jr.  Clinic Number: [id number]    Physician Orders: PT Eval and Treat   Medical Diagnosis from Referral: Acute bilateral lower back pain with right-sided sciatica  Evaluation Date: 3/9/2022  Authorization Period Expiration: 2/18/23  Plan of Care Expiration: 6/9/22  Visit # / Visits authorized: 1/ 1; 1/20    Visit Date: 3/16/2022    PTA Visit #: 0/5     Time In: 8:40  Time Out: 9:30  Total Billable Time: 45 minutes    SUBJECTIVE     Pt reports: his right side bothering him, reports doing HEP.  He was compliant with home exercise program.  Response to previous treatment: no change  Functional change: no change    Pain: 4/10  Location: right back      OBJECTIVE     Objective Measures updated at progress report unless specified.     Treatment     Bradley received the treatments listed below:      therapeutic exercises to [id number] strength, endurance, ROM, flexibility, posture and core stabilization for 30 minutes including:  Bike 6 min  Shuttle squats 4B  KTC with ball  LTR with ball  SL clams YTB  SL hip abduction   PPT with abdominal brace    manual therapy techniques: Manual traction and STM were applied to the: low back for 15 minutes, including:  Long axis distraction  SL gapping    supervised modalities after being cleared for contradictions: TENS:  Bradley received TENS electrical stimulation for pain to the right low back. Pt received continuous mode for 0 minutes. Bradley tolerated treatment well without any adverse effects.     hot pack for 0 minutes to right back.    cold pack for 0 minutes to right back.        Patient Education and Home Exercises     Home Exercises Provided and Patient Education Provided     Education provided:   - importance of core and hip strengthening    Written Home Exercises Provided: Patient instructed to cont prior HEP. Exercises were reviewed and Bradley was able to demonstrate them prior  to the end of the session.  Bradley demonstrated good  understanding of the education provided. See EMR under Patient Instructions for exercises provided during therapy sessions    ASSESSMENT   Patient     Bradley Is progressing well towards his goals.   Pt prognosis is Good.     Pt will continue to benefit from skilled outpatient physical therapy to address the deficits listed in the problem list box on initial evaluation, provide pt/family education and to maximize pt's level of independence in the home and community environment.     Pt's spiritual, cultural and educational needs considered and pt agreeable to plan of care and goals.     Anticipated barriers to physical therapy: none    Goals:  Short Term Goals:  1.I with HEP  2.Pt to increase lumbar ROM to 50% all directions pain free.  3. Pt to increase BLE strength by 1/2 grade.  4.Pt to have pain less than 3/10 at all times.     Long Term Goals: In 12 weeks  1.Pt to score less than 15% impaired on the FOTO.  2. Patient to demo increase in LE strength by 1 grade.  3. Patient to have decreased pain to 1/10 at all times.  4. Patient to demo increase lumbar ROM to 90% pain free.  5. Patient to perform daily activities including standing/walking without limitation.  PLAN     Monitor response to tx and progress with PT POC as indicated.      Izabella Lopez, PT

## 2022-03-18 NOTE — PROGRESS NOTES
[facility]   Physical Therapy Assistant Treatment Note     Therapy Diagnosis:   Encounter Diagnoses   Name Primary?    Decreased ROM of lumbar spine Yes    Weakness of both lower extremities      Physician: Kayley Diaz DO    Name: Bradley Petty Jr.  Clinic Number: [id number]    Physician Orders: PT Eval and Treat   Medical Diagnosis from Referral: Acute bilateral lower back pain with right-sided sciatica  Evaluation Date: 3/9/2022  Authorization Period Expiration: 2/18/23  Plan of Care Expiration: 6/9/22  Visit # / Visits authorized: 1/ 1; 2/20    Visit Date: 3/21/2022    PTA Visit #: 1/5     Time In: 7:50  Time Out: 8:25  Total Billable Time: 25 minutes    SUBJECTIVE     Pt reports: he has burning in right upper trap and scapula. Also has pain radiating from back to right leg.  He was compliant with home exercise program.  Response to previous treatment: no change   Functional change: no change    Pain: 9/10   Location: right back  And right upper trap    OBJECTIVE     Objective Measures updated at progress report unless specified.     Treatment     Bradley received the treatments listed below:      therapeutic exercises to [id number] strength, endurance, ROM, flexibility, posture and core stabilization for 15 minutes including:    Chin tucks  LTR  Open books  Torso rotation for mobility  UT stretch  Scapular retraction    manual therapy techniques: Manual traction and STM were applied to the: neck for 10 minutes, including:  Cervical distraction  STM to right upper traps and cervical paraspinals    supervised modalities after being cleared for contradictions: TENS:  Bradley received TENS electrical stimulation for pain to the right low back. Pt received continuous mode for 0 minutes. Bradley tolerated treatment well without any adverse effects.     hot pack for 10 minutes to right back.    cold pack for 0 minutes to right back.        Patient Education and Home Exercises     Home  Exercises Provided and Patient Education Provided     Education provided:   - importance of core and hip strengthening    Written Home Exercises Provided: Patient instructed to cont prior HEP. Exercises were reviewed and Bradley was able to demonstrate them prior to the end of the session.  Bradley demonstrated good  understanding of the education provided. See EMR under Patient Instructions for exercises provided during therapy sessions    ASSESSMENT   Patient was late to therapy so performed limited exercises today. He reports increased burning and pain on right sided neck radiating to arm and scapula. Patient is lacking thoracic rotation so he performed more exercises for mobility today. Educated patient on postural awareness since he has rounded shoulders. Performed manual STM to decrease muscle tension with good tolerance. Applied MHP at end of session for pain relief      Bradley Is progressing well towards his goals.   Pt prognosis is Good.     Pt will continue to benefit from skilled outpatient physical therapy to address the deficits listed in the problem list box on initial evaluation, provide pt/family education and to maximize pt's level of independence in the home and community environment.     Pt's spiritual, cultural and educational needs considered and pt agreeable to plan of care and goals.     Anticipated barriers to physical therapy: none    Goals:   Short Term Goals:  1.I with HEP  2.Pt to increase lumbar ROM to 50% all directions pain free.  3. Pt to increase BLE strength by 1/2 grade.  4.Pt to have pain less than 3/10 at all times.     Long Term Goals: In 12 weeks  1.Pt to score less than 15% impaired on the FOTO.  2. Patient to demo increase in LE strength by 1 grade.  3. Patient to have decreased pain to 1/10 at all times.  4. Patient to demo increase lumbar ROM to 90% pain free.  5. Patient to perform daily activities including standing/walking without limitation.  PLAN     Monitor response to  tx and progress with PT POC as indicated.      Shae Collins, PTA

## 2022-03-21 ENCOUNTER — CLINICAL SUPPORT (OUTPATIENT)
Dept: REHABILITATION | Facility: HOSPITAL | Age: 62
End: 2022-03-21
Payer: COMMERCIAL

## 2022-03-21 ENCOUNTER — DOCUMENTATION ONLY (OUTPATIENT)
Dept: REHABILITATION | Facility: HOSPITAL | Age: 62
End: 2022-03-21
Payer: COMMERCIAL

## 2022-03-21 DIAGNOSIS — M53.86 DECREASED ROM OF LUMBAR SPINE: Primary | ICD-10-CM

## 2022-03-21 DIAGNOSIS — R29.898 WEAKNESS OF BOTH LOWER EXTREMITIES: ICD-10-CM

## 2022-03-21 PROCEDURE — 97110 THERAPEUTIC EXERCISES: CPT | Mod: CQ

## 2022-03-21 PROCEDURE — 97140 MANUAL THERAPY 1/> REGIONS: CPT | Mod: CQ

## 2022-03-21 NOTE — PROGRESS NOTES
PT/PTA met face to face to discuss pt's treatment plan and progress towards established goals. Pt will be seen by a physical therapist minimally every 6th visit or every 30 days.    Shae Collins PTA

## 2022-03-23 ENCOUNTER — CLINICAL SUPPORT (OUTPATIENT)
Dept: REHABILITATION | Facility: HOSPITAL | Age: 62
End: 2022-03-23
Payer: COMMERCIAL

## 2022-03-23 DIAGNOSIS — M53.86 DECREASED ROM OF LUMBAR SPINE: Primary | ICD-10-CM

## 2022-03-23 PROCEDURE — 97140 MANUAL THERAPY 1/> REGIONS: CPT

## 2022-03-23 PROCEDURE — 97014 ELECTRIC STIMULATION THERAPY: CPT

## 2022-03-23 PROCEDURE — 97110 THERAPEUTIC EXERCISES: CPT

## 2022-03-23 NOTE — PROGRESS NOTES
[facility]   Physical Therapy Treatment Note     Therapy Diagnosis:   Encounter Diagnosis   Name Primary?    Decreased ROM of lumbar spine Yes     Physician: Kayley Diaz DO    Name: Bradley Petty Jr.  Clinic Number: [id number]    Physician Orders: PT Eval and Treat   Medical Diagnosis from Referral: Acute bilateral lower back pain with right-sided sciatica  Evaluation Date: 3/9/2022  Authorization Period Expiration: 2/18/23  Plan of Care Expiration: 6/9/22  Visit # / Visits authorized: 1/ 1; 3/20    Visit Date: 3/23/2022    PTA Visit #: 1/5     Time In: 8:45am  Time Out: 9:50am  Total Billable Time: 65 minutes    SUBJECTIVE     Pt reports: he has continued burning in right upper trap and scapula.  His back pain continues on the right side of the lower back.   He was compliant with home exercise program.  Response to previous treatment: no change   Functional change: no change    Pain: 9/10   Location: right back  And right upper trap    OBJECTIVE     Objective Measures updated at progress report unless specified.     Treatment     Bradley received the treatments listed below:      therapeutic exercises to develop strength, endurance, ROM, flexibility, posture and core stabilization for 40 minutes including:    UBE 3/3  B shoulder ER w/YTB 20x  Lat pull downs 30# @ pulleys 20x2  PPT 20x 5 sec hold  SKTC 10 sec hold x 5  Chin tucks 20x  LTR 10x  UT stretch 4x  Serratus Punches 20x  Supine ham stretch 20 sec hold x 4      Deferred:  Scapular retraction  Open books  Torso rotation for mobility      manual therapy techniques: Manual traction and STM were applied to the: neck for 10 minutes, including:  Cervical distraction  STM to right upper traps and cervical paraspinals  ULLT to right upper extremity    supervised modalities after being cleared for contradictions: TENS:  Bradley received TENS electrical stimulation for pain to the right low back. Pt received continuous mode for 15  minutes. Bradley tolerated treatment well without any adverse effects.     hot pack for 15 minutes to right back and neck during TENS.    cold pack for 0 minutes to right back.        Patient Education and Home Exercises     Home Exercises Provided and Patient Education Provided     Education provided:   - importance of core and hip strengthening    Written Home Exercises Provided: Patient instructed to cont prior HEP. Exercises were reviewed and Bradley was able to demonstrate them prior to the end of the session.  Bradley demonstrated good  understanding of the education provided. See EMR under Patient Instructions for exercises provided during therapy sessions    ASSESSMENT   Patient has continued pain in the R upper trap and low back area.   Patient is lacking thoracic rotation so he performed more exercises for mobility today. Educated patient on postural awareness since he has rounded shoulders. Demonstrates nerve tension in the RUE.  He felt better after TENS to the neck/shoulder and low back.       Bradley Is progressing well towards his goals.   Pt prognosis is Good.     Pt will continue to benefit from skilled outpatient physical therapy to address the deficits listed in the problem list box on initial evaluation, provide pt/family education and to maximize pt's level of independence in the home and community environment.     Pt's spiritual, cultural and educational needs considered and pt agreeable to plan of care and goals.     Anticipated barriers to physical therapy: none    Goals:   Short Term Goals:  1.I with HEP  2.Pt to increase lumbar ROM to 50% all directions pain free.  3. Pt to increase BLE strength by 1/2 grade.  4.Pt to have pain less than 3/10 at all times.     Long Term Goals: In 12 weeks  1.Pt to score less than 15% impaired on the FOTO.  2. Patient to demo increase in LE strength by 1 grade.  3. Patient to have decreased pain to 1/10 at all times.  4. Patient to demo increase lumbar ROM to  90% pain free.  5. Patient to perform daily activities including standing/walking without limitation.  PLAN     Monitor response to tx and progress with PT POC as indicated.      Imelda Nunez, PT

## 2022-03-30 ENCOUNTER — CLINICAL SUPPORT (OUTPATIENT)
Dept: REHABILITATION | Facility: HOSPITAL | Age: 62
End: 2022-03-30
Payer: COMMERCIAL

## 2022-03-30 DIAGNOSIS — M53.86 DECREASED ROM OF LUMBAR SPINE: Primary | ICD-10-CM

## 2022-03-30 PROCEDURE — 97014 ELECTRIC STIMULATION THERAPY: CPT | Mod: CQ

## 2022-03-30 PROCEDURE — 97110 THERAPEUTIC EXERCISES: CPT | Mod: CQ

## 2022-03-30 PROCEDURE — 97140 MANUAL THERAPY 1/> REGIONS: CPT | Mod: CQ

## 2022-03-30 NOTE — PROGRESS NOTES
[facility]   Physical Therapy Assistant Treatment Note     Therapy Diagnosis:   Encounter Diagnosis   Name Primary?    Decreased ROM of lumbar spine Yes     Physician: Kayley Diaz DO    Name: Bradley Petty Jr.  Clinic Number: [id number]    Physician Orders: PT Eval and Treat   Medical Diagnosis from Referral: Acute bilateral lower back pain with right-sided sciatica  Evaluation Date: 3/9/2022  Authorization Period Expiration: 2/18/23  Plan of Care Expiration: 6/9/22  Visit # / Visits authorized: 1/ 1; 4/20    Visit Date: 3/30/2022    PTA Visit #: 1/5     Time In: 8:25am  Time Out: 9:35am  Total Billable Time: 70 minutes    SUBJECTIVE     Pt reports: feels like needles in his right upper trap and pain in right side of the lower back.    He was compliant with home exercise program.  Response to previous treatment: no change   Functional change: no change    Pain: 9/10   Location: right back  And right upper trap    OBJECTIVE     Objective Measures updated at progress report unless specified.     Treatment     Bradley received the treatments listed below:      therapeutic exercises to develop strength, endurance, ROM, flexibility, posture and core stabilization for 45 minutes including:    UBE 6' backwards for mobility and posture  Chin tucks 20x   SKTC 10 sec hold x 5  LTR 10x  PPT 20x 5 sec hold  UT stretch 4x  Serratus Punches 20x  Supine ham stretch 20 sec hold x 4  Hand heel rocks 10x  Matrix Scapular retraction 25# 2x10  Open books  B shoulder ER w/YTB 20x  Torso rotation for mobility 2'      manual therapy techniques: Manual traction and STM were applied to the: neck for 10 minutes, including:  Cervical distraction  STM to right upper traps and cervical paraspinals  ULLT to right upper extremity    supervised modalities after being cleared for contradictions: TENS:  Bradley received TENS electrical stimulation for pain to the right low back. Pt received continuous mode for 15  minutes. Bradley tolerated treatment well without any adverse effects.     hot pack for 15 minutes to right back and neck during TENS.    cold pack for 0 minutes to right back.        Patient Education and Home Exercises     Home Exercises Provided and Patient Education Provided     Education provided:   - importance of core and hip strengthening    Written Home Exercises Provided: Patient instructed to cont prior HEP. Exercises were reviewed and Bradley was able to demonstrate them prior to the end of the session.  Bradley demonstrated good  understanding of the education provided. See EMR under Patient Instructions for exercises provided during therapy sessions    ASSESSMENT   Patient has constant pain in the R upper trap and low back area. Continued soft tissue mobilization to decrease tension in right upper traps and modalities to decrease pain with good tolerance. Patient is limited in thoracic rotation and lumbar flexion when performing stretches. He will benefit from continued skilled therapy to improve pain, posture and strength    Bradley Is progressing well towards his goals.   Pt prognosis is Good.     Pt will continue to benefit from skilled outpatient physical therapy to address the deficits listed in the problem list box on initial evaluation, provide pt/family education and to maximize pt's level of independence in the home and community environment.     Pt's spiritual, cultural and educational needs considered and pt agreeable to plan of care and goals.     Anticipated barriers to physical therapy: none    Goals:   Short Term Goals:  1.I with HEP  2.Pt to increase lumbar ROM to 50% all directions pain free.  3. Pt to increase BLE strength by 1/2 grade.  4.Pt to have pain less than 3/10 at all times.     Long Term Goals: In 12 weeks  1.Pt to score less than 15% impaired on the FOTO.  2. Patient to demo increase in LE strength by 1 grade.  3. Patient to have decreased pain to 1/10 at all times.  4. Patient  to demo increase lumbar ROM to 90% pain free.  5. Patient to perform daily activities including standing/walking without limitation.  PLAN     Monitor response to tx and progress with PT POC as indicated.      Shae Collins, PTA

## 2022-04-06 ENCOUNTER — CLINICAL SUPPORT (OUTPATIENT)
Dept: REHABILITATION | Facility: HOSPITAL | Age: 62
End: 2022-04-06
Payer: COMMERCIAL

## 2022-04-06 DIAGNOSIS — M53.86 DECREASED ROM OF LUMBAR SPINE: Primary | ICD-10-CM

## 2022-04-06 PROCEDURE — 97014 ELECTRIC STIMULATION THERAPY: CPT

## 2022-04-06 PROCEDURE — 97110 THERAPEUTIC EXERCISES: CPT

## 2022-04-06 NOTE — PROGRESS NOTES
[facility]   Physical Therapy Treatment Note     Therapy Diagnosis:   Encounter Diagnosis   Name Primary?    Decreased ROM of lumbar spine Yes     Physician: Kayley Diaz DO    Name: Bradley Petty .  Clinic Number: [id number]    Physician Orders: PT Eval and Treat   Medical Diagnosis from Referral: Acute bilateral lower back pain with right-sided sciatica  Evaluation Date: 3/9/2022  Authorization Period Expiration: 2/18/23  Plan of Care Expiration: 6/9/22  Visit # / Visits authorized: 5/20    Visit Date: 4/6/2022    PTA Visit #: -/5     Time In: 7:50 am (patient late)  Time Out: 8:45 am  Total Billable Time: 30 minutes    SUBJECTIVE     Pt reports: that he feels about the same.   He was compliant with home exercise program.  Response to previous treatment: no change   Functional change: no change    Pain: 9/10   Location: right back  And right upper trap    OBJECTIVE     Objective Measures updated at progress report unless specified.     Treatment     Bradley received the treatments listed below:      Therapeutic exercises to develop strength, endurance, ROM, flexibility, posture and core stabilization for 25 minutes including:    UBE 3 F/3 B backwards for mobility and posture  Supine cervical rotation + neck retraction x 8 reps  LTR x 2 min  Hand heel rocks 10x  Matrix Scapular retraction 35# 2x10  Thread the needle  Back EX Machine 30# x 20 reps  Torso rotation for mobility 2'    Manual therapy techniques: Manual traction and STM were applied to the: neck for 0 minutes, including:  NA today    Moist heat + TENS x 15 min to low back and cervical spine.    Patient Education and Home Exercises     Home Exercises Provided and Patient Education Provided     Education provided:   - importance of core and hip strengthening    Written Home Exercises Provided: Patient instructed to cont prior HEP. Exercises were reviewed and Bradley was able to demonstrate them prior to the end of the  session.  Bradley demonstrated good  understanding of the education provided. See EMR under Patient Instructions for exercises provided during therapy sessions    ASSESSMENT   Patient has been consistently late for most of his appts, making it very hard to progress him. Reports he might do better being closer to [place] since he has been staying there more. Still having a lot of R UT tension and discussed performing dry needling with him in the future.    Bradley Is progressing well towards his goals.   Pt prognosis is Good.     Pt will continue to benefit from skilled outpatient physical therapy to address the deficits listed in the problem list box on initial evaluation, provide pt/family education and to maximize pt's level of independence in the home and community environment.     Pt's spiritual, cultural and educational needs considered and pt agreeable to plan of care and goals.     Anticipated barriers to physical therapy: none    Goals:   Short Term Goals:  1.I with HEP MET  2.Pt to increase lumbar ROM to 50% all directions pain free. PROGRESSING, NOT MET  3. Pt to increase BLE strength by 1/2 grade. PROGRESSING, NOT MET  4.Pt to have pain less than 3/10 at all times. PROGRESSING, NOT MET     Long Term Goals: In 12 weeks  1.Pt to score less than 15% impaired on the FOTO. PROGRESSING, NOT MET  2. Patient to demo increase in LE strength by 1 grade. PROGRESSING, NOT MET  3. Patient to have decreased pain to 1/10 at all times. PROGRESSING, NOT MET  4. Patient to demo increase lumbar ROM to 90% pain free. PROGRESSING, NOT MET  5. Patient to perform daily activities including standing/walking without limitation. PROGRESSING, NOT MET  PLAN     Monitor response to tx and progress with PT POC as indicated.      Stiven Cardozo, PT

## 2022-04-11 ENCOUNTER — CLINICAL SUPPORT (OUTPATIENT)
Dept: REHABILITATION | Facility: HOSPITAL | Age: 62
End: 2022-04-11
Payer: COMMERCIAL

## 2022-04-11 DIAGNOSIS — M53.86 DECREASED ROM OF LUMBAR SPINE: Primary | ICD-10-CM

## 2022-04-11 PROCEDURE — 97110 THERAPEUTIC EXERCISES: CPT

## 2022-04-11 PROCEDURE — 97014 ELECTRIC STIMULATION THERAPY: CPT

## 2022-04-11 PROCEDURE — 97140 MANUAL THERAPY 1/> REGIONS: CPT

## 2022-04-11 NOTE — PROGRESS NOTES
[facility]   Physical Therapy Treatment Note     Therapy Diagnosis:   Encounter Diagnosis   Name Primary?    Decreased ROM of lumbar spine Yes     Physician: Kayley Diaz DO    Name: Bradley Petty Jr.  Clinic Number: [id number]    Physician Orders: PT Eval and Treat   Medical Diagnosis from Referral: Acute bilateral lower back pain with right-sided sciatica  Evaluation Date: 3/9/2022  Authorization Period Expiration: 2/18/23  Plan of Care Expiration: 6/9/22  Visit # / Visits authorized: 6/20    Visit Date: 4/11/2022    PTA Visit #: -/5     Time In: 7:35 am  Time Out: 8:30 am  Total Billable Time: 40 minutes    SUBJECTIVE     Pt reports: that his R UT/shoulder and is lower back is still really bothering him.   He was compliant with home exercise program.  Response to previous treatment: no change   Functional change: no change    Pain: 7/10   Location: right back  And right upper trap    OBJECTIVE     Objective Measures updated at progress report unless specified.     Treatment     Bradley received the treatments listed below:      Therapeutic exercises to develop strength, endurance, ROM, flexibility, posture and core stabilization for 30 minutes including:    UBE 3 F/3 B backwards for mobility and posture  Incline downward dog x 10 reps with focus on breathing  Seated neck retractions x 15 reps 3 second holds  Seated thoracic rotations  Matrix Scapular retraction 45# 2x10  Sidelying open books  Back EX Machine 40# x 20 reps  Torso rotation for mobility 2'    Manual therapy techniques: Manual traction and STM were applied to the: neck for 10 minutes, including:  CTJ grade 2 mobs R side  Thoracic P/As in sitting  STM to R UT  Grade 2 mobs lumbar spine    Moist heat + TENS x 15 min to low back and cervical spine.    Patient Education and Home Exercises     Home Exercises Provided and Patient Education Provided     Education provided:   - importance of core and hip  strengthening    Written Home Exercises Provided: Patient instructed to cont prior HEP. Exercises were reviewed and Bradley was able to demonstrate them prior to the end of the session.  Bradley demonstrated good  understanding of the education provided. See EMR under Patient Instructions for exercises provided during therapy sessions    ASSESSMENT   Patient demonstrated better tolerance to exercises and mobility this session with minimal increase in adverse symptoms. Still having R UT and lumbar symptoms, but not consistent with certain positional changes. Still very sensitized with small ROM.    Bradley Is progressing well towards his goals.   Pt prognosis is Good.     Pt will continue to benefit from skilled outpatient physical therapy to address the deficits listed in the problem list box on initial evaluation, provide pt/family education and to maximize pt's level of independence in the home and community environment.     Pt's spiritual, cultural and educational needs considered and pt agreeable to plan of care and goals.     Anticipated barriers to physical therapy: none    Goals:   Short Term Goals:  1.I with HEP MET  2.Pt to increase lumbar ROM to 50% all directions pain free. PROGRESSING, NOT MET  3. Pt to increase BLE strength by 1/2 grade. PROGRESSING, NOT MET  4.Pt to have pain less than 3/10 at all times. PROGRESSING, NOT MET     Long Term Goals: In 12 weeks  1.Pt to score less than 15% impaired on the FOTO. PROGRESSING, NOT MET  2. Patient to demo increase in LE strength by 1 grade. PROGRESSING, NOT MET  3. Patient to have decreased pain to 1/10 at all times. PROGRESSING, NOT MET  4. Patient to demo increase lumbar ROM to 90% pain free. PROGRESSING, NOT MET  5. Patient to perform daily activities including standing/walking without limitation. PROGRESSING, NOT MET  PLAN     Monitor response to tx and progress with PT POC as indicated.      Stiven Cardozo, PT

## 2022-04-13 ENCOUNTER — CLINICAL SUPPORT (OUTPATIENT)
Dept: REHABILITATION | Facility: HOSPITAL | Age: 62
End: 2022-04-13
Payer: COMMERCIAL

## 2022-04-13 DIAGNOSIS — M53.86 DECREASED ROM OF LUMBAR SPINE: Primary | ICD-10-CM

## 2022-04-13 PROCEDURE — 97140 MANUAL THERAPY 1/> REGIONS: CPT

## 2022-04-13 PROCEDURE — 97110 THERAPEUTIC EXERCISES: CPT

## 2022-04-13 PROCEDURE — 97014 ELECTRIC STIMULATION THERAPY: CPT

## 2022-04-13 NOTE — PROGRESS NOTES
[facility]   Physical Therapy Treatment Note     Therapy Diagnosis:   Encounter Diagnosis   Name Primary?    Decreased ROM of lumbar spine Yes     Physician: Kayley Diaz DO    Name: Bradley Petty Jr.  Clinic Number: [id number]    Physician Orders: PT Eval and Treat   Medical Diagnosis from Referral: Acute bilateral lower back pain with right-sided sciatica  Evaluation Date: 3/9/2022  Authorization Period Expiration: 2/18/23  Plan of Care Expiration: 6/9/22  Visit # / Visits authorized: 7/20    Visit Date: 4/13/2022    PTA Visit #: -/5     Time In: 7:40 am (patient late)  Time Out: 8:35 am  Total Billable Time: 40 minutes    SUBJECTIVE     Pt reports: that he is feeling about the same.   He was compliant with home exercise program.  Response to previous treatment: no change   Functional change: no change    Pain: 7/10   Location: right back  And right upper trap    OBJECTIVE     Objective Measures updated at progress report unless specified.     Treatment     Bradley received the treatments listed below:      Therapeutic exercises to develop strength, endurance, ROM, flexibility, posture and core stabilization for 30 minutes including:    LE bike x 6 min for LE strengthening  Seated trunk roll out with ball x 2 min  Seated UE bike 2 min F/2 min B with 20 second sprints  Supine neck retractions + rotations x 10 reps 3 second holds  Matrix Scapular retraction 40# 2x10  Sidelying open books  Back EX Machine 44# x 20 reps  Torso rotation for mobility 2'  Torso rotation machine x 10 reps each side 20# x 10 reps    Manual therapy techniques: Manual traction and STM were applied to the: neck for 10 minutes, including:  Cervical sideglides grade 2  STM to R UT  Cervical traction holds 5-10 seconds for multiple reps    Moist heat + TENS x 10 min to low back and cervical spine.    Patient Education and Home Exercises     Home Exercises Provided and Patient Education Provided     Education  provided:   - importance of core and hip strengthening    Written Home Exercises Provided: Patient instructed to cont prior HEP. Exercises were reviewed and Bradley was able to demonstrate them prior to the end of the session.  Bradley demonstrated good  understanding of the education provided. See EMR under Patient Instructions for exercises provided during therapy sessions    ASSESSMENT   Patient demonstrated better tolerance to more strengthening this session with less increase in symptoms. Also noted better cervical and lumbar mobility since last session.    Bradley Is progressing well towards his goals.   Pt prognosis is Good.     Pt will continue to benefit from skilled outpatient physical therapy to address the deficits listed in the problem list box on initial evaluation, provide pt/family education and to maximize pt's level of independence in the home and community environment.     Pt's spiritual, cultural and educational needs considered and pt agreeable to plan of care and goals.     Anticipated barriers to physical therapy: none    Goals:   Short Term Goals:  1.I with HEP MET  2.Pt to increase lumbar ROM to 50% all directions pain free. PROGRESSING, NOT MET  3. Pt to increase BLE strength by 1/2 grade. PROGRESSING, NOT MET  4.Pt to have pain less than 3/10 at all times. PROGRESSING, NOT MET     Long Term Goals: In 12 weeks  1.Pt to score less than 15% impaired on the FOTO. PROGRESSING, NOT MET  2. Patient to demo increase in LE strength by 1 grade. PROGRESSING, NOT MET  3. Patient to have decreased pain to 1/10 at all times. PROGRESSING, NOT MET  4. Patient to demo increase lumbar ROM to 90% pain free. PROGRESSING, NOT MET  5. Patient to perform daily activities including standing/walking without limitation. PROGRESSING, NOT MET  PLAN     Monitor response to tx and progress with PT POC as indicated.      Stiven Cardozo, PT

## 2022-04-18 ENCOUNTER — TELEPHONE (OUTPATIENT)
Dept: INTERNAL MEDICINE | Facility: CLINIC | Age: 62
End: 2022-04-18
Payer: COMMERCIAL

## 2022-04-18 ENCOUNTER — CLINICAL SUPPORT (OUTPATIENT)
Dept: REHABILITATION | Facility: HOSPITAL | Age: 62
End: 2022-04-18
Payer: COMMERCIAL

## 2022-04-18 DIAGNOSIS — M53.86 DECREASED ROM OF LUMBAR SPINE: Primary | ICD-10-CM

## 2022-04-18 PROCEDURE — 97012 MECHANICAL TRACTION THERAPY: CPT

## 2022-04-18 PROCEDURE — 97110 THERAPEUTIC EXERCISES: CPT

## 2022-04-18 PROCEDURE — 97140 MANUAL THERAPY 1/> REGIONS: CPT | Mod: 59

## 2022-04-18 NOTE — TELEPHONE ENCOUNTER
----- Message from Rekha Yuan sent at 4/18/2022 12:16 PM CDT -----  .Type:  Sooner Apoointment Request    Caller is requesting a sooner appointment.  Caller declined first available appointment listed below.  Caller will not accept being placed on the waitlist and is requesting a message be sent to doctor.  Name of Caller: BIANCA PANG JR. [[id number]]  When is the first available appointment? 05/03  Symptoms: male issues   Would the patient rather a call back or a response via [facility]?   Best Call Back Number: [phone]  Additional Information:

## 2022-04-18 NOTE — PROGRESS NOTES
[facility]   Physical Therapy Treatment Note     Therapy Diagnosis:   Encounter Diagnosis   Name Primary?    Decreased ROM of lumbar spine Yes     Physician: Kayley Diaz DO    Name: Bradley Petty Jr.  Clinic Number: [id number]    Physician Orders: PT Eval and Treat   Medical Diagnosis from Referral: Acute bilateral lower back pain with right-sided sciatica  Evaluation Date: 3/9/2022  Authorization Period Expiration: 2/18/23  Plan of Care Expiration: 6/9/22  Visit # / Visits authorized: 8/20    Visit Date: 4/18/2022    PTA Visit #: -/5     Time In: 7:40 am  Time Out: 8:40 am  Total Billable Time: 30 minutes    SUBJECTIVE     Pt reports: that he is feeling about the same.   He was compliant with home exercise program.  Response to previous treatment: no change   Functional change: no change    Pain: 7/10   Location: right back  And right upper trap    OBJECTIVE     Objective Measures updated at progress report unless specified.     Treatment     Bradley received the treatments listed below:      Therapeutic exercises to develop strength, endurance, ROM, flexibility, posture and core stabilization for 20 minutes including:    LE bike x 8 min for LE strengthening  Matrix Scapular retraction 40# 2x10  Sidelying open books  Torso rotation for mobility 2'  Torso rotation machine x 10 reps each side 20# x 10 reps    Manual therapy techniques: Manual traction and STM were applied to the: neck for 10 minutes, including:  Cervical sideglides grade 2  STM to R UT  Cervical traction holds 5-10 seconds for multiple reps    Mechanical traction 30# pull 30 second holds + 12# holds for 15 seconds to cervical spine with good tolerance and no increase in adverse symptoms.    Moist heat x 10 min to low back and cervical spine.    Patient Education and Home Exercises     Home Exercises Provided and Patient Education Provided     Education provided:   - importance of core and hip strengthening    Written  Home Exercises Provided: Patient instructed to cont prior HEP. Exercises were reviewed and Bradley was able to demonstrate them prior to the end of the session.  Bradley demonstrated good  understanding of the education provided. See EMR under Patient Instructions for exercises provided during therapy sessions    ASSESSMENT   Patient is still having R upper trap/neck pain as well as lumbar pain and has remained unchanged during his last 8 visits. Discussed with patient if the traction did not improve symptoms, it will be time to go back to [place] to decide next steps.     Bradley Is progressing well towards his goals.   Pt prognosis is Good.     Pt will continue to benefit from skilled outpatient physical therapy to address the deficits listed in the problem list box on initial evaluation, provide pt/family education and to maximize pt's level of independence in the home and community environment.     Pt's spiritual, cultural and educational needs considered and pt agreeable to plan of care and goals.     Anticipated barriers to physical therapy: none    Goals:   Short Term Goals:  1.I with HEP MET  2.Pt to increase lumbar ROM to 50% all directions pain free. PROGRESSING, NOT MET  3. Pt to increase BLE strength by 1/2 grade. PROGRESSING, NOT MET  4.Pt to have pain less than 3/10 at all times. PROGRESSING, NOT MET     Long Term Goals: In 12 weeks  1.Pt to score less than 15% impaired on the FOTO. PROGRESSING, NOT MET  2. Patient to demo increase in LE strength by 1 grade. PROGRESSING, NOT MET  3. Patient to have decreased pain to 1/10 at all times. PROGRESSING, NOT MET  4. Patient to demo increase lumbar ROM to 90% pain free. PROGRESSING, NOT MET  5. Patient to perform daily activities including standing/walking without limitation. PROGRESSING, NOT MET  PLAN     Monitor response to tx and progress with PT POC as indicated.      Stiven Cardozo, PT

## 2022-04-20 ENCOUNTER — CLINICAL SUPPORT (OUTPATIENT)
Dept: REHABILITATION | Facility: HOSPITAL | Age: 62
End: 2022-04-20
Payer: COMMERCIAL

## 2022-04-20 DIAGNOSIS — M53.86 DECREASED ROM OF LUMBAR SPINE: Primary | ICD-10-CM

## 2022-04-20 PROCEDURE — 97140 MANUAL THERAPY 1/> REGIONS: CPT

## 2022-04-20 PROCEDURE — 97110 THERAPEUTIC EXERCISES: CPT

## 2022-04-20 NOTE — PROGRESS NOTES
[facility]   Physical Therapy Treatment Note     Therapy Diagnosis:   Encounter Diagnosis   Name Primary?    Decreased ROM of lumbar spine Yes     Physician: Kayley Diaz DO    Name: Bradley Petty .  Clinic Number: [id number]    Physician Orders: PT Eval and Treat   Medical Diagnosis from Referral: Acute bilateral lower back pain with right-sided sciatica  Evaluation Date: 3/9/2022  Authorization Period Expiration: 2/18/23  Plan of Care Expiration: 6/9/22  Visit # / Visits authorized: 9/20    Visit Date: 4/20/2022    PTA Visit #: -/5     Time In: 7:40 am  Time Out: 8:40 am  Total Billable Time: 40 minutes    SUBJECTIVE     Pt reports: that the traction felt great last time.   He was compliant with home exercise program.  Response to previous treatment: no change   Functional change: improvement in pain/adverse symptoms    Pain: 5/10   Location: right back  And right upper trap    OBJECTIVE     Objective Measures updated at progress report unless specified.     Treatment     Bradley received the treatments listed below:      Therapeutic exercises to develop strength, endurance, ROM, flexibility, posture and core stabilization for 30 minutes including:    LE bike x 7 min for LE strengthening  Matrix Scapular retraction 40# 2x10  Sidelying open books  Prone hip EX 2 x 10  Torso rotation for mobility 1'  Torso rotation machine x 15 reps each side 24# x 10 reps    Manual therapy techniques: Manual traction and STM were applied to the: neck for 10 minutes, including:  Cervical sideglides grade 2  Grade 2/3 mobs lumbar spine  PROM stretching to R hip in prone position    Mechanical traction 30# pull 30 second holds + 12# holds for 15 seconds to cervical spine- attempted traction, but after several attempts was not able to get hold without strap slipping.    Moist heat x 10 min to low back and cervical spine.    Patient Education and Home Exercises     Home Exercises Provided and Patient  Education Provided     Education provided:   - importance of core and hip strengthening    Written Home Exercises Provided: Patient instructed to cont prior HEP. Exercises were reviewed and Bradley was able to demonstrate them prior to the end of the session.  Bradley demonstrated good  understanding of the education provided. See EMR under Patient Instructions for exercises provided during therapy sessions    ASSESSMENT   Patient demonstrated good tolerance to strengthening and more EX work this session. Not able to get a good  with strap for the traction, so was not able to perform. Will try next visit in different position with less extension of neck to see if that improves symptoms.    Bradley Is progressing well towards his goals.   Pt prognosis is Good.     Pt will continue to benefit from skilled outpatient physical therapy to address the deficits listed in the problem list box on initial evaluation, provide pt/family education and to maximize pt's level of independence in the home and community environment.     Pt's spiritual, cultural and educational needs considered and pt agreeable to plan of care and goals.     Anticipated barriers to physical therapy: none    Goals:   Short Term Goals:  1.I with HEP MET  2.Pt to increase lumbar ROM to 50% all directions pain free. PROGRESSING, NOT MET  3. Pt to increase BLE strength by 1/2 grade. PROGRESSING, NOT MET  4.Pt to have pain less than 3/10 at all times. PROGRESSING, NOT MET     Long Term Goals: In 12 weeks  1.Pt to score less than 15% impaired on the FOTO. PROGRESSING, NOT MET  2. Patient to demo increase in LE strength by 1 grade. PROGRESSING, NOT MET  3. Patient to have decreased pain to 1/10 at all times. PROGRESSING, NOT MET  4. Patient to demo increase lumbar ROM to 90% pain free. PROGRESSING, NOT MET  5. Patient to perform daily activities including standing/walking without limitation. PROGRESSING, NOT MET  PLAN     Monitor response to tx and progress  with PT POC as indicated.      Stiven Cardozo, PT

## 2022-04-25 ENCOUNTER — OFFICE VISIT (OUTPATIENT)
Dept: INTERNAL MEDICINE | Facility: CLINIC | Age: 62
End: 2022-04-25
Payer: COMMERCIAL

## 2022-04-25 ENCOUNTER — HOSPITAL ENCOUNTER (OUTPATIENT)
Dept: RADIOLOGY | Facility: HOSPITAL | Age: 62
Discharge: HOME | End: 2022-04-25
Attending: INTERNAL MEDICINE
Payer: COMMERCIAL

## 2022-04-25 VITALS
DIASTOLIC BLOOD PRESSURE: 100 MMHG | TEMPERATURE: 97 F | WEIGHT: 263.25 LBS | SYSTOLIC BLOOD PRESSURE: 142 MMHG | HEART RATE: 70 BPM | BODY MASS INDEX: 35.66 KG/M2 | HEIGHT: 72 IN | OXYGEN SATURATION: 98 %

## 2022-04-25 DIAGNOSIS — M54.50 CHRONIC RIGHT-SIDED LOW BACK PAIN, UNSPECIFIED WHETHER SCIATICA PRESENT: ICD-10-CM

## 2022-04-25 DIAGNOSIS — Z00.00 ROUTINE GENERAL MEDICAL EXAMINATION AT A HEALTH CARE FACILITY: Primary | ICD-10-CM

## 2022-04-25 DIAGNOSIS — M54.12 CERVICAL RADICULITIS: ICD-10-CM

## 2022-04-25 DIAGNOSIS — R25.2 LEG CRAMPS: ICD-10-CM

## 2022-04-25 DIAGNOSIS — G89.29 CHRONIC RIGHT-SIDED LOW BACK PAIN, UNSPECIFIED WHETHER SCIATICA PRESENT: ICD-10-CM

## 2022-04-25 DIAGNOSIS — R35.0 FREQUENCY OF MICTURITION: ICD-10-CM

## 2022-04-25 DIAGNOSIS — B35.1 ONYCHOMYCOSIS: ICD-10-CM

## 2022-04-25 PROCEDURE — 99999 PR PBB SHADOW E&M-EST. PATIENT-LVL V: ICD-10-PCS | Mod: PBBFAC,,, | Performed by: INTERNAL MEDICINE

## 2022-04-25 PROCEDURE — 72100 XR LUMBAR SPINE AP AND LATERAL: ICD-10-PCS | Mod: 26,,, | Performed by: RADIOLOGY

## 2022-04-25 PROCEDURE — 72040 XR CERVICAL SPINE AP LATERAL: ICD-10-PCS | Mod: 26,,, | Performed by: RADIOLOGY

## 2022-04-25 PROCEDURE — 72100 X-RAY EXAM L-S SPINE 2/3 VWS: CPT | Mod: 26,,, | Performed by: RADIOLOGY

## 2022-04-25 PROCEDURE — 99999 PR PBB SHADOW E&M-EST. PATIENT-LVL V: CPT | Mod: PBBFAC,,, | Performed by: INTERNAL MEDICINE

## 2022-04-25 PROCEDURE — 72100 X-RAY EXAM L-S SPINE 2/3 VWS: CPT | Mod: TC

## 2022-04-25 PROCEDURE — 72040 X-RAY EXAM NECK SPINE 2-3 VW: CPT | Mod: 26,,, | Performed by: RADIOLOGY

## 2022-04-25 PROCEDURE — 99396 PR PREVENTIVE VISIT,EST,40-64: ICD-10-PCS | Mod: S$GLB,,, | Performed by: INTERNAL MEDICINE

## 2022-04-25 PROCEDURE — 72040 X-RAY EXAM NECK SPINE 2-3 VW: CPT | Mod: TC

## 2022-04-25 PROCEDURE — 99396 PREV VISIT EST AGE 40-64: CPT | Mod: S$GLB,,, | Performed by: INTERNAL MEDICINE

## 2022-04-25 RX ORDER — TAMSULOSIN HYDROCHLORIDE 0.4 MG/1
0.4 CAPSULE ORAL DAILY
Qty: 90 CAPSULE | Refills: 0 | Status: SHIPPED | OUTPATIENT
Start: 2022-04-25 | End: 2022-06-27
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
## 2022-04-26 ENCOUNTER — LAB VISIT (OUTPATIENT)
Dept: LAB | Facility: HOSPITAL | Age: 62
End: 2022-04-26
Attending: INTERNAL MEDICINE
Payer: COMMERCIAL

## 2022-04-26 ENCOUNTER — OFFICE VISIT (OUTPATIENT)
Dept: UROLOGY | Facility: CLINIC | Age: 62
End: 2022-04-26
Payer: COMMERCIAL

## 2022-04-26 VITALS
BODY MASS INDEX: 35.34 KG/M2 | DIASTOLIC BLOOD PRESSURE: 97 MMHG | SYSTOLIC BLOOD PRESSURE: 159 MMHG | WEIGHT: 260.56 LBS | HEART RATE: 86 BPM | TEMPERATURE: 98 F

## 2022-04-26 DIAGNOSIS — R35.0 FREQUENCY OF MICTURITION: ICD-10-CM

## 2022-04-26 DIAGNOSIS — Z00.00 ROUTINE GENERAL MEDICAL EXAMINATION AT A HEALTH CARE FACILITY: ICD-10-CM

## 2022-04-26 LAB
ALBUMIN SERPL BCP-MCNC: 3.7 G/DL (ref 3.5–5.2)
ALP SERPL-CCNC: 80 U/L (ref 55–135)
ALT SERPL W/O P-5'-P-CCNC: 19 U/L (ref 10–44)
AST SERPL-CCNC: 22 U/L (ref 10–40)
BILIRUB DIRECT SERPL-MCNC: 0.2 MG/DL (ref 0.1–0.3)
BILIRUB SERPL-MCNC: 0.5 MG/DL (ref 0.1–1)
BILIRUB SERPL-MCNC: NORMAL MG/DL
BLOOD URINE, POC: NORMAL
CHOLEST SERPL-MCNC: 181 MG/DL (ref 120–199)
CHOLEST/HDLC SERPL: 4.9 {RATIO} (ref 2–5)
CLARITY, POC UA: CLEAR
COLOR, POC UA: YELLOW
COMPLEXED PSA SERPL-MCNC: 3.2 NG/ML (ref 0–4)
GLUCOSE UR QL STRIP: NORMAL
HDLC SERPL-MCNC: 37 MG/DL (ref 40–75)
HDLC SERPL: 20.4 % (ref 20–50)
KETONES UR QL STRIP: NORMAL
LDLC SERPL CALC-MCNC: 126.8 MG/DL (ref 63–159)
LEUKOCYTE ESTERASE URINE, POC: NORMAL
MAGNESIUM SERPL-MCNC: 1.8 MG/DL (ref 1.6–2.6)
NITRITE, POC UA: NORMAL
NONHDLC SERPL-MCNC: 144 MG/DL
PH, POC UA: 5
POC RESIDUAL URINE VOLUME: 16 ML (ref 0–100)
PROT SERPL-MCNC: 7.5 G/DL (ref 6–8.4)
PROTEIN, POC: NORMAL
SPECIFIC GRAVITY, POC UA: 1.02
TRIGL SERPL-MCNC: 86 MG/DL (ref 30–150)
TSH SERPL DL<=0.005 MIU/L-ACNC: 1.54 UIU/ML (ref 0.4–4)
UROBILINOGEN, POC UA: NORMAL

## 2022-04-26 PROCEDURE — 81002 URINALYSIS NONAUTO W/O SCOPE: CPT | Mod: S$GLB,,, | Performed by: NURSE PRACTITIONER

## 2022-04-26 PROCEDURE — 36415 COLL VENOUS BLD VENIPUNCTURE: CPT | Performed by: INTERNAL MEDICINE

## 2022-04-26 PROCEDURE — 99204 PR OFFICE/OUTPT VISIT, NEW, LEVL IV, 45-59 MIN: ICD-10-PCS | Mod: S$GLB,,, | Performed by: NURSE PRACTITIONER

## 2022-04-26 PROCEDURE — 51798 US URINE CAPACITY MEASURE: CPT | Mod: S$GLB,,, | Performed by: NURSE PRACTITIONER

## 2022-04-26 PROCEDURE — 99999 PR PBB SHADOW E&M-EST. PATIENT-LVL IV: ICD-10-PCS | Mod: PBBFAC,,, | Performed by: NURSE PRACTITIONER

## 2022-04-26 PROCEDURE — 83735 ASSAY OF MAGNESIUM: CPT | Performed by: INTERNAL MEDICINE

## 2022-04-26 PROCEDURE — 99999 PR PBB SHADOW E&M-EST. PATIENT-LVL IV: CPT | Mod: PBBFAC,,, | Performed by: NURSE PRACTITIONER

## 2022-04-26 PROCEDURE — 84153 ASSAY OF PSA TOTAL: CPT | Performed by: INTERNAL MEDICINE

## 2022-04-26 PROCEDURE — 80076 HEPATIC FUNCTION PANEL: CPT | Performed by: INTERNAL MEDICINE

## 2022-04-26 PROCEDURE — 84443 ASSAY THYROID STIM HORMONE: CPT | Performed by: INTERNAL MEDICINE

## 2022-04-26 PROCEDURE — 51798 POCT BLADDER SCAN: ICD-10-PCS | Mod: S$GLB,,, | Performed by: NURSE PRACTITIONER

## 2022-04-26 PROCEDURE — 99204 OFFICE O/P NEW MOD 45 MIN: CPT | Mod: S$GLB,,, | Performed by: NURSE PRACTITIONER

## 2022-04-26 PROCEDURE — 80061 LIPID PANEL: CPT | Performed by: INTERNAL MEDICINE

## 2022-04-26 PROCEDURE — 81002 POCT URINE DIPSTICK WITHOUT MICROSCOPE: ICD-10-PCS | Mod: S$GLB,,, | Performed by: NURSE PRACTITIONER

## 2022-04-26 RX ORDER — TAMSULOSIN HYDROCHLORIDE 0.4 MG/1
0.4 CAPSULE ORAL DAILY
Qty: 30 CAPSULE | Refills: 11 | Status: SHIPPED | OUTPATIENT
Start: 2022-04-26 | End: 2022-06-27

## 2022-04-26 NOTE — PROGRESS NOTES
Chief Complaint:   Slow stream    HPI:   Patient is a 61-year-old male that is presenting with a slow stream and nocturia.  Patient was seen by primary care provider and has a PSA lab schedule today.  Urine in clinic is negative.  PVR is low, 60 mL.  No BPH meds.   No abd/pelvic pain and no exac/rel factors.  No hematuria.  No urolithiasis.      Allergies:  Bactrim [sulfamethoxazole-trimethoprim]    Medications:  has a current medication list which includes the following prescription(s): acetaminophen, b complex vitamins, cholecalciferol (vitamin d3), cyanocobalamin (vitamin b-12), herbal drugs, multivitamin, truvada, tamsulosin, and tamsulosin.    Review of Systems:  General: No fever, chills, fatigability, or weight loss.  Skin: No rashes, itching, or changes in color or texture of skin.  Chest: Denies HENDERSON, cyanosis, wheezing, cough, and sputum production.  Abdomen: Appetite fine. No weight loss. Denies diarrhea, abdominal pain, hematemesis, or blood in stool.  Musculoskeletal: No joint stiffness or swelling. Denies back pain.  : As above.  All other review of systems negative.    PMH:   has a past medical history of Acid reflux, Arthralgia, Glaucoma, Hypertension, and Sjogren's syndrome.    PSH:   has a past surgical history that includes Back surgery.    FamHx: family history includes Cancer in his maternal grandmother and mother; Diabetes Mellitus in his maternal grandmother; Glaucoma in his maternal grandmother; Rheum arthritis in his maternal grandmother.    SocHx:  reports that he has never smoked. He has never used smokeless tobacco. He reports that he does not drink alcohol and does not use drugs.      Physical Exam:  Vitals:    04/26/22 0822   BP: (!) 159/97   Pulse: 86   Temp: 97.7 °F (36.5 °C)     General:  Obese male, A&Ox3, no apparent distress, no deformities  Neck: No masses, normal thyroid  Lungs: normal inspiration, no use of accessory muscles  Heart: normal pulse, no arrhythmias  Abdomen: Soft,  NT, ND, no masses, no hernias, no hepatosplenomegaly  Lymphatic: Neck and groin nodes negative  Skin: The skin is warm and dry. No jaundice.  Ext: No c/c/e.  : Test desc jemma, no abnormalities of epididymus. Penis  with normal penile and scrotal skin. Meatus normal. Normal rectal tone, no hemorrhoids. Prost 35 gm no nodules or masses appreciated. SV not palpable. Perineum and anus normal.    Labs/Studies:   See HPI    Impression/Plan:   1. Prostate cancer screening  Exam was unremarkable, patient sent to lab for PSA    2. BPH  Tamsulosin prescribed to local pharmacy.  Patient to return to clinic in 2 months as a follow-up.

## 2022-05-03 ENCOUNTER — PATIENT OUTREACH (OUTPATIENT)
Dept: ADMINISTRATIVE | Facility: HOSPITAL | Age: 62
End: 2022-05-03
Payer: COMMERCIAL

## 2022-05-08 DIAGNOSIS — M54.10 RADICULITIS: Primary | ICD-10-CM

## 2022-05-10 ENCOUNTER — PATIENT MESSAGE (OUTPATIENT)
Dept: ADMINISTRATIVE | Facility: HOSPITAL | Age: 62
End: 2022-05-10
Payer: COMMERCIAL

## 2022-05-17 NOTE — PROGRESS NOTES
3rd. Req. Contacted Dr. Mccarthy office about LAM. LVM with medical records department. 1 wk f/u set

## 2022-05-17 NOTE — TELEPHONE ENCOUNTER
----- Message from Lyndsay Brooks sent at 5/17/2022  1:44 PM CDT -----  Contact: [phone]  Pt is requesting you send in brand prescription only for emtricitabine-tenofovir 200-300 mg (TRUVADA) 200-300 mg Tab. Can you please call the pharmacy and update?      [facility]/pharmacy #[id number] - [place] - [address] AT [facility]  [address] [facility] [facility] [place] [address]  Phone: [phone] Fax: [phone]

## 2022-05-18 ENCOUNTER — TELEPHONE (OUTPATIENT)
Dept: UROLOGY | Facility: CLINIC | Age: 62
End: 2022-05-18
Payer: COMMERCIAL

## 2022-05-18 NOTE — TELEPHONE ENCOUNTER
"Pt states he is taking Flomax as prescribed and was working well, but now it's not working, states he's having that same "pressure/urgency". Informed pt that I would send message to Ms. Elisabeth Zapien and get back with him. Pt agreed and was thankful for the call, MATTEO ANGLIN.     ----- Message from Lainey Eaton sent at 5/18/2022 12:59 PM CDT -----  Contact: BIANCA PANG JR. [[id number]]  Type: Patient Call Back    Who called:BIANCA PANG JR. [[id number]]    What is the request in detail: The patient states that the medication is not working     Can the clinic reply by FLIPSCYRIL?    Would the patient rather a call back or a response via [facility]?     Best call back number: [phone] (mobile)    Additional Information: tamsulosin (FLOMAX) 0.4 mg Cap           "

## 2022-05-20 ENCOUNTER — TELEPHONE (OUTPATIENT)
Dept: UROLOGY | Facility: CLINIC | Age: 62
End: 2022-05-20
Payer: COMMERCIAL

## 2022-05-20 ENCOUNTER — TELEPHONE (OUTPATIENT)
Dept: PAIN MEDICINE | Facility: CLINIC | Age: 62
End: 2022-05-20
Payer: COMMERCIAL

## 2022-05-20 RX ORDER — EMTRICITABINE AND TENOFOVIR DISOPROXIL FUMARATE 200; 300 MG/1; MG/1
1 TABLET, FILM COATED ORAL DAILY
Qty: 30 TABLET | Refills: 6 | Status: SHIPPED | OUTPATIENT
Start: 2022-05-20 | End: 2022-05-24

## 2022-05-20 RX ORDER — EMTRICITABINE AND TENOFOVIR DISOPROXIL FUMARATE 200; 300 MG/1; MG/1
1 TABLET, FILM COATED ORAL DAILY
Qty: 30 TABLET | Refills: 11 | Status: CANCELLED | OUTPATIENT
Start: 2022-05-20 | End: 2023-05-20

## 2022-05-20 RX ORDER — EFAVIRENZ, EMTRICITABINE AND TENOFOVIR DISOPROXIL FUMARATE 600; 200; 300 MG/1; MG/1; MG/1
1 TABLET, FILM COATED ORAL DAILY
Qty: 30 TABLET | Refills: 11 | OUTPATIENT
Start: 2022-05-20 | End: 2023-05-20

## 2022-05-20 NOTE — TELEPHONE ENCOUNTER
----- Message from Patricia Condon sent at 5/20/2022  7:06 AM CDT -----  Please call plt @ [phone] regarding appt on 6/28, pt need to rescheduled before 6/2, pt will going out of states.

## 2022-05-22 ENCOUNTER — TELEPHONE (OUTPATIENT)
Dept: INTERNAL MEDICINE | Facility: CLINIC | Age: 62
End: 2022-05-22
Payer: COMMERCIAL

## 2022-05-22 DIAGNOSIS — G89.29 CHRONIC RIGHT-SIDED LOW BACK PAIN, UNSPECIFIED WHETHER SCIATICA PRESENT: ICD-10-CM

## 2022-05-22 DIAGNOSIS — M54.50 CHRONIC RIGHT-SIDED LOW BACK PAIN, UNSPECIFIED WHETHER SCIATICA PRESENT: ICD-10-CM

## 2022-05-22 DIAGNOSIS — B35.1 ONYCHOMYCOSIS: Primary | ICD-10-CM

## 2022-05-22 DIAGNOSIS — M54.12 CERVICAL RADICULITIS: ICD-10-CM

## 2022-05-22 RX ORDER — TERBINAFINE HYDROCHLORIDE 250 MG/1
250 TABLET ORAL DAILY
Qty: 45 TABLET | Refills: 0 | Status: SHIPPED | OUTPATIENT
Start: 2022-05-22 | End: 2022-06-27

## 2022-05-23 ENCOUNTER — PATIENT MESSAGE (OUTPATIENT)
Dept: INFECTIOUS DISEASES | Facility: CLINIC | Age: 62
End: 2022-05-23
Payer: COMMERCIAL

## 2022-05-23 NOTE — TELEPHONE ENCOUNTER
Please notify pt and let him know that I noticed his pain clinic appt is not scheduled until late June. Let me know if he knows of another pain clinic he would like for me to send a referral. Also I sent in prescription for medication for toenail fungus. Needs to recheck hfp 4 to 6 weeks after starting the medication.

## 2022-05-23 NOTE — TELEPHONE ENCOUNTER
Relayed result to pt who verbalized understanding. Referral needed externally for dr flores at [facility]. Please place repeat lab orders

## 2022-05-23 NOTE — PATIENT INSTRUCTIONS
Check with your insurance company regarding cost of cialis, viagra, levitra, staxyn, stendra.    complains of pain/discomfort

## 2022-05-24 RX ORDER — EMTRICITABINE AND TENOFOVIR DISOPROXIL FUMARATE 200; 300 MG/1; MG/1
1 TABLET, FILM COATED ORAL DAILY
Qty: 30 TABLET | Refills: 5 | Status: SHIPPED | OUTPATIENT
Start: 2022-05-24 | End: 2022-05-25

## 2022-05-24 NOTE — PROGRESS NOTES
Contacted Dr. Mccarthy office by fax and phone.  No record received for colonoscopy on patient. Sent to [facility]

## 2022-05-24 NOTE — PROGRESS NOTES
Received call back from Dr. Mccarthy office. Spoke with Kathy, report was sent to [facility] on 05/03.  Staff will do a courtesy fax of the patient colonoscopy record

## 2022-06-09 ENCOUNTER — OFFICE VISIT (OUTPATIENT)
Dept: UROLOGY | Facility: CLINIC | Age: 62
End: 2022-06-09
Payer: COMMERCIAL

## 2022-06-09 VITALS
DIASTOLIC BLOOD PRESSURE: 88 MMHG | BODY MASS INDEX: 35.21 KG/M2 | HEART RATE: 96 BPM | RESPIRATION RATE: 18 BRPM | SYSTOLIC BLOOD PRESSURE: 138 MMHG | WEIGHT: 260 LBS | HEIGHT: 72 IN

## 2022-06-09 DIAGNOSIS — R35.0 FREQUENCY OF MICTURITION: Primary | ICD-10-CM

## 2022-06-09 PROCEDURE — 99999 PR PBB SHADOW E&M-EST. PATIENT-LVL IV: CPT | Mod: PBBFAC,,, | Performed by: UROLOGY

## 2022-06-09 PROCEDURE — 99214 OFFICE O/P EST MOD 30 MIN: CPT | Mod: S$GLB,,, | Performed by: UROLOGY

## 2022-06-09 PROCEDURE — 99214 PR OFFICE/OUTPT VISIT, EST, LEVL IV, 30-39 MIN: ICD-10-PCS | Mod: S$GLB,,, | Performed by: UROLOGY

## 2022-06-09 PROCEDURE — 99999 PR PBB SHADOW E&M-EST. PATIENT-LVL IV: ICD-10-PCS | Mod: PBBFAC,,, | Performed by: UROLOGY

## 2022-06-09 RX ORDER — SOLIFENACIN SUCCINATE 5 MG/1
5 TABLET, FILM COATED ORAL DAILY
Qty: 30 TABLET | Refills: 11 | Status: SHIPPED | OUTPATIENT
Start: 2022-06-09 | End: 2022-11-01

## 2022-06-09 NOTE — PROGRESS NOTES
Chief Complaint:   Slow stream    HPI:   06/09/2022 - returns today for follow-up, was started on Flomax by NP Curry at his last visit, patient states that initially it helped but he has had recurrence of symptoms, has urgency and infrequent urge incontinence, uses a paper towel his pants that he does not soak through his underwear, also notes a slow stream, as well as nocturia times 8-10, denies any gross hematuria, admits that he does not drink enough fluids, may have one cup of coffee in the morning and nothing else during the day until the evening time, he does not restrict fluids prior to bed, denies gross hematuria or dysuria, concerned that he will have to start wearing diapers as he is going on a trip to [place] soon    Patient is a 61-year-old male that is presenting with a slow stream and nocturia.  Patient was seen by primary care provider and has a PSA lab schedule today.  Urine in clinic is negative.  PVR is low, 60 mL.  No BPH meds.   No abd/pelvic pain and no exac/rel factors.  No hematuria.  No urolithiasis.      Allergies:  Patient has no known allergies.    Medications:  has a current medication list which includes the following prescription(s): acetaminophen, b complex vitamins, cholecalciferol (vitamin d3), cyanocobalamin (vitamin b-12), herbal drugs, multivitamin, terbinafine hcl, truvada, solifenacin, tamsulosin, and tamsulosin.    Review of Systems:  General: No fever, chills  Skin: No rashes  Chest:  Denies cough and sputum production  Heart: Denies chest pain  Resp: Denies dyspnea  Abdomen: Denies diarrhea, abdominal pain, hematemesis, or blood in stool.  Musculoskeletal: No joint stiffness or swelling. Denies back pain.  : see HPI  Neuro: no dizziness or weakness      PMH:   has a past medical history of Acid reflux, Arthralgia, Glaucoma, Hypertension, and Sjogren's syndrome.    PSH:   has a past surgical history that includes Back surgery.    FamHx: family history includes Cancer in  his maternal grandmother and mother; Diabetes Mellitus in his maternal grandmother; Glaucoma in his maternal grandmother; Rheum arthritis in his maternal grandmother.    SocHx:  reports that he has never smoked. He has never used smokeless tobacco. He reports that he does not drink alcohol and does not use drugs.      Physical Exam:  Vitals:    06/09/22 1320   BP: 138/88   Pulse: 96   Resp: 18     General: awake, alert, cooperative  Head: NC/AT  Ears: external ears normal  Eyes: sclera normal  Lungs: normal inspiration, NAD  Heart: well-perfused  Skin: The skin is warm and dry  Ext: No c/c/e.  Neuro: grossly intact, AOx3  : Test desc jemma, no abnormalities of epididymus. Penis  with normal penile and scrotal skin. Meatus normal. Normal rectal tone, no hemorrhoids. Prost 35 gm no nodules or masses appreciated. SV not palpable. Perineum and anus normal.    Labs/Studies:   Lab Results   Component Value Date    WBC 4.16 02/09/2022    HGB 14.3 02/09/2022    HCT 42.9 02/09/2022     02/09/2022     (L) 02/09/2022    K 4.3 02/09/2022     02/09/2022    CREATININE 1.4 02/09/2022    BUN 14 02/09/2022    CO2 27 02/09/2022    TSH 1.536 04/26/2022    PSA 3.2 04/26/2022    HGBA1C 5.5 07/06/2017    CHOL 181 04/26/2022    TRIG 86 04/26/2022    HDL 37 (L) 04/26/2022    ALT 19 04/26/2022    AST 22 04/26/2022     Impression/Plan:   Urgency - start vesicare, SEs reviewed, also reviewed life style modifications, instructed to drink more water throughout the day    Weak stream - possibly due to overactive bladder and low bladder volumes verses BPH, continue Flomax for now    Nocturia - limit fluid intake prior to bed    - f/u 6 weeks    Abdi Grijalva MD

## 2022-06-22 ENCOUNTER — IMMUNIZATION (OUTPATIENT)
Dept: PHARMACY | Facility: CLINIC | Age: 62
End: 2022-06-22
Payer: COMMERCIAL

## 2022-06-22 DIAGNOSIS — Z23 NEED FOR VACCINATION: Primary | ICD-10-CM

## 2022-06-25 DIAGNOSIS — B35.1 ONYCHOMYCOSIS: ICD-10-CM

## 2022-06-27 RX ORDER — TERBINAFINE HYDROCHLORIDE 250 MG/1
TABLET ORAL
Qty: 45 TABLET | Refills: 0 | Status: SHIPPED | OUTPATIENT
Start: 2022-06-27 | End: 2022-12-14 | Stop reason: SDUPTHER
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
## 2022-06-28 ENCOUNTER — OFFICE VISIT (OUTPATIENT)
Dept: PAIN MEDICINE | Facility: CLINIC | Age: 62
End: 2022-06-28
Payer: COMMERCIAL

## 2022-06-28 VITALS
WEIGHT: 261.13 LBS | BODY MASS INDEX: 35.37 KG/M2 | HEIGHT: 72 IN | RESPIRATION RATE: 17 BRPM | HEART RATE: 78 BPM | DIASTOLIC BLOOD PRESSURE: 85 MMHG | SYSTOLIC BLOOD PRESSURE: 143 MMHG

## 2022-06-28 DIAGNOSIS — M51.36 DDD (DEGENERATIVE DISC DISEASE), LUMBAR: Primary | ICD-10-CM

## 2022-06-28 DIAGNOSIS — M47.816 LUMBAR FACET ARTHROPATHY: ICD-10-CM

## 2022-06-28 DIAGNOSIS — M46.1 SACROILIITIS: ICD-10-CM

## 2022-06-28 DIAGNOSIS — M54.10 RADICULITIS: ICD-10-CM

## 2022-06-28 DIAGNOSIS — M54.9 DORSALGIA, UNSPECIFIED: ICD-10-CM

## 2022-06-28 PROCEDURE — 99204 PR OFFICE/OUTPT VISIT, NEW, LEVL IV, 45-59 MIN: ICD-10-PCS | Mod: S$GLB,,, | Performed by: ANESTHESIOLOGY

## 2022-06-28 PROCEDURE — 99204 OFFICE O/P NEW MOD 45 MIN: CPT | Mod: S$GLB,,, | Performed by: ANESTHESIOLOGY

## 2022-06-28 PROCEDURE — 99999 PR PBB SHADOW E&M-EST. PATIENT-LVL V: CPT | Mod: PBBFAC,,, | Performed by: ANESTHESIOLOGY

## 2022-06-28 PROCEDURE — 99999 PR PBB SHADOW E&M-EST. PATIENT-LVL V: ICD-10-PCS | Mod: PBBFAC,,, | Performed by: ANESTHESIOLOGY

## 2022-06-28 RX ORDER — PREGABALIN 75 MG/1
CAPSULE ORAL
Qty: 147 CAPSULE | Refills: 0 | Status: SHIPPED | OUTPATIENT
Start: 2022-06-28 | End: 2022-11-16 | Stop reason: SDUPTHER
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
## 2022-06-28 NOTE — PATIENT INSTRUCTIONS
General Neck and Back Pain    Both neck and back pain are usually caused by injury to the muscles or ligaments of the spine. Sometimes the disks that separate each bone of the spine may cause pain by pressing on a nearby nerve. Back and neck pain may appear after a sudden twisting or bending force (such as in a car accident), or sometimes after a simple awkward movement. In either case, muscle spasm is often present and adds to the pain.  Acute neck and back pain usually gets better in 1 to 2 weeks. Pain related to disk disease, arthritis in the spinal joints or spinal stenosis (narrowing of the spinal canal) can become chronic and last for months or years.  Back and neck pain are common problems. Most people feel better in 1 or 2 weeks, and most of the rest in 1 to 2 months. Most people can remain active.  People experience and describe pain differently.  Pain can be sharp, stabbing, shooting, aching, cramping, or burning  Movement, standing, bending, lifting, sitting, or walking may worsen the pain  Pain can be localized to one spot or area, or it can be more generalized  Pain can spread or radiate upwards, downwards, to the front, or go down your arms  Muscle spasm may occur.  Most of the time mechanical problems with the muscles or spine cause the pain. it is usually caused by an injury, whether known or not, to the muscles or ligaments. While illnesses can cause back pain, it is usually not caused by a serious illness. Pain is usually related to physical activity, whether sports, exercise, work, or normal activity. Sometimes it can occur without an identifiable cause. This can happen simply by stretching or moving wrong, without noting pain at the time. Other causes include:  Overexertion, lifting, pushing, pulling incorrectly or too aggressively.  Sudden twisting, bending or stretching from an accident (car or fall), or accidental movement.  Poor posture  Poor conditioning, lack of regular exercise  Spinal  disc disease or arthritis  Stress  Pregnancy, or illness like appendicitis, bladder or kidney infection, pelvic infections   Home care  For neck pain: Use a comfortable pillow that supports the head and keeps the spine in a neutral position. The position of the head should not be tilted forward or backward.  When in bed, try to find a position of comfort. A firm mattress is best. Try lying flat on your back with pillows under your knees. You can also try lying on your side with your knees bent up towards your chest and a pillow between your knees.  At first, do not try to stretch out the sore spots. If there is a strain, it is not like the good soreness you get after exercising without an injury. In this case, stretching may make it worse.  Avoid prolonged sitting, long car rides or travel. This puts more stress on the lower back than standing or walking.  During the first 24 to 72 hours after an injury, apply an ice pack to the painful area for 20 minutes and then remove it for 20 minutes over a period of 60 to 90 minutes or several times a day.   You can alternate ice and heat therapies. Talk with your healthcare provider about the best treatment for your back or neck pain. As a safety precaution, do not use a heating pad at bedtime. Sleeping with a heating pad can lead to skin burns or tissue damage.  Therapeutic massage can help relax the back and neck muscles without stretching them.  Be aware of safe lifting methods and do not lift anything over 15 pounds until all the pain is gone.  Medications  Talk to your healthcare provider before using medicine, especially if you have other medical problems or are taking other medicines.  You may use over-the-counter medicine to control pain, unless another pain medicine was prescribed. If you have chronic conditions like diabetes, liver or kidney disease, stomach ulcers,  gastrointestinal bleeding, or are taking blood thinner medicines.  Be careful if you are given pain  medicines, narcotics, or medicine for muscle spasm. They can cause drowsiness, and can affect your coordination, reflexes, and judgment. Do not drive or operate heavy machinery.  Follow-up care  Follow up with your healthcare provider, or as advised. Physical therapy or further tests may be needed.  If X-rays were taken, you will be notified of any new findings that may affect your care.  Call 911  Seek emergency medical care if any of the following occur:  Trouble breathing  Confusion  Very drowsy or trouble awakening  Fainting or loss of consciousness  Rapid or very slow heart rate  Loss of bowel or bladder control  When to seek medical advice  Call your healthcare provider right away if any of these occur:  Pain becomes worse or spreads into your arms or legs  Weakness, numbness or pain in one or both arms or legs  Numbness in the groin area  Difficulty walking  Fever of 100.4ºF (38ºC) or higher, or as directed by your healthcare provider  Date Last Reviewed: 7/1/2016  © 7697-6693 [employer]. [address] [place] [address]. All rights reserved. This information is not intended as a substitute for professional medical care. Always follow your healthcare professional's instructions.       Understanding Lumbar Radiculopathy    Lumbar radiculopathy is irritation or inflammation of a nerve root in the low back. It causes symptoms that spread out from the back down one or both legs. To understand this condition, it helps to understand the parts of the spine:  Vertebrae. These are bones that stack to form the spine. The lumbar spine contains the 5 bottom vertebrae.  Disks. These are soft pads of tissue between the vertebrae. They act as shock absorbers for the spine.  Spinal canal. This is a tunnel formed within the stacked vertebrae. In the lumbar spine, nerves run through this canal.  Nerves. These branch off and leave the spinal canal, traveling out to parts of the body. As they leave the  spinal canal, nerves pass through openings between the vertebrae. The nerve root is the part of the nerve that is closest to the spinal canal.  Sciatic nerve. This is a large nerve formed from several nerve roots in the low back. This nerve extends down the back of the leg to the foot.  With lumbar radiculopathy, nerve roots in the low back become irritated. This leads to pain and symptoms. The sciatic nerve is commonly involved, so the condition is often called sciatica.  What causes lumbar radiculopathy?  Aging, injury, poor posture, extra body weight, and other issues can lead to problems in the low back. These problems may then irritate nerve roots. They include:  Damage to a disk in the lumbar spine. The damaged disk may then press on nearby nerve roots.  Degeneration from wear and tear, and aging. This can lead to narrowing (stenosis) of the openings between the vertebrae. The narrowed openings press on nerve roots as they leave the spinal canal.  Unstable spine. This is when a vertebra slips forward. It can then press on a nerve root.  Other, less common things can put pressure on nerves in the low back. These include diabetes, infection, or a tumor.  Symptoms of lumbar radiculopathy  These include:  Pain in the low back  Pain, numbness, tingling, or weakness that travels into the buttocks, hip, groin, or leg  Muscle spasms  Treatment for lumbar radiculopathy  In most cases, your healthcare provider will first try treatments that help relieve symptoms. These may include:  Prescription and over-the-counter pain medicines. These help relieve pain, swelling, and irritation.  Limits on positions and activities that increase pain. But lying in bed or avoiding all movement is only recommended for a short period of time.  Physical therapy, including exercises and stretches. This helps decrease pain and increase movement and function.  Steroid shots into the lower back. This may help relieve symptoms for a  time.  Weight-loss program. If you are overweight, losing extra pounds may help relieve symptoms.  In some cases, you may need surgery to fix the underlying problem. This depends on the cause, the symptoms, and how long the pain has lasted.  Possible complications  Over time, an irritated and inflamed nerve may become damaged. This may lead to long-lasting (permanent) numbness or weakness in your legs and feet. If symptoms change suddenly or get worse, be sure to let your healthcare provider know.  When to call your healthcare provider  Call your healthcare provider right away if you have any of these:  New pain or pain that gets worse  New or increasing weakness, tingling, or numbness in your leg or foot  Problems controlling your bladder or bowel   Date Last Reviewed: 3/10/2016  © 0164-5974 [employer]. [address] [place] [address], [place] [address]. All rights reserved. This information is not intended as a substitute for professional medical care. Always follow your healthcare professional's instructions.       Exercises to Strengthen Your Lower Back  Strong lower back and abdominal muscles work together to support your spine. The exercises below will help strengthen the lower back. It is important that you begin exercising slowly and increase levels gradually.  Always begin any exercise program with stretching. If you feel pain while doing any of these exercises, stop and talk to your doctor about a more specific exercise program that better suits your condition.   Low back stretch  The point of stretching is to make you more flexible and increase your range of motion. Stretch only as much as you are able. Stretch slowly. Do not push your stretch to the limit. If at any point you feel pain while stretching, this is your (temporary) limit.  Lie on your back with your knees bent and both feet on the ground.  Slowly raise your left knee to your chest as you flatten your lower back against the floor. Hold  for 5 seconds.  Relax and repeat the exercise with your right knee.  Do 10 of these exercises for each leg.  Repeat hugging both knees to your chest at the same time.  Building lower back strength  Start your exercise routine with 10 to 30 minutes a day, 1 to 3 times a day.  Initial exercises  Lying on your back:  Ankle pumps: Move your foot up and down, towards your head, and then away. Repeat 10 times with each foot.  Heel slides: Slowly bend your knee, drawing the heel of your foot towards you. Then slide your heel/foot from you, straightening your knee. Do not lift your foot off the floor (this is not a leg lift).  Abdominal contraction: Bend your knees and put your hands on your stomach. Tighten your stomach muscles. Hold for 5 seconds, then relax. Repeat 10 times.  Straight leg raise: Bend one leg at the knee and keep the other leg straight. Tighten your stomach muscles. Slowly lift your straight leg 6 to 12 inches off the floor and hold for up to 5 seconds. Repeat 10 times on each side.  Standing:  Wall squats: Stand with your back against the wall. Move your feet about 12 inches away from the wall. Tighten your stomach muscles, and slowly bend your knees until they are at about a 45 degree angle. Do not go down too far. Hold about 5 seconds. Then slowly return to your starting position. Repeat 10 times.  Heel raises: Stand facing the wall. Slowly raise the heels of your feet up and down, while keeping your toes on the floor. If you have trouble balancing, you can touch the wall with your hands. Repeat 10 times.  More advanced exercises  When you feel comfortable enough, try these exercises.  Kneeling lumbar extension: Begin on your hands and knees. At the same time, raise and straighten your right arm and left leg until they are parallel to the ground. Hold for 2 seconds and come back slowly to a starting position. Repeat with left arm and right leg, alternating 10 times.  Prone lumbar extension: Lie face  down, arms extended overhead, palms on the floor. At the same time, raise your right arm and left leg as high as comfortably possible. Hold for 10 seconds and slowly return to start. Repeat with left arm and right leg, alternating 10 times. Gradually build up to 20 times. (Advanced: Repeat this exercise raising both arms and both legs a few inches off the floor at the same time. Hold for 5 seconds and release.)  Pelvic tilt: Lie on the floor on your back with your knees bent at 90 degrees. Your feet should be flat on the floor. Inhale, exhale, then slowly contract your abdominal muscles bringing your navel toward your spine. Let your pelvis rock back until your lower back is flat on the floor. Hold for 10 seconds while breathing smoothly.  Abdominal crunch: Perform a pelvic tilt (above) flattening your lower back against the floor. Holding the tension in your abdominal muscles, take another breath and raise your shoulder blades off the ground (this is not a full sit-up). Keep your head in line with your body (dont bend your neck forward). Hold for 2 seconds, then slowly lower.  Date Last Reviewed: 6/1/2016  © 7583-5002 [employer]. [address] [place] [address], [place] [address]. All rights reserved. This information is not intended as a substitute for professional medical care. Always follow your healthcare professional's instructions.

## 2022-07-06 ENCOUNTER — TELEPHONE (OUTPATIENT)
Dept: PAIN MEDICINE | Facility: CLINIC | Age: 62
End: 2022-07-06
Payer: COMMERCIAL

## 2022-07-06 NOTE — TELEPHONE ENCOUNTER
Pt stated that he got a call and they said that his MRI was approved. I gave pt the number for the [facility] to make sure it was approved. I informed pt to give us a call if he had any questions. Pt understood.

## 2022-07-08 ENCOUNTER — PATIENT MESSAGE (OUTPATIENT)
Dept: PAIN MEDICINE | Facility: HOSPITAL | Age: 62
End: 2022-07-08
Payer: COMMERCIAL

## 2022-07-14 ENCOUNTER — HOSPITAL ENCOUNTER (OUTPATIENT)
Dept: RADIOLOGY | Facility: HOSPITAL | Age: 62
Discharge: HOME | End: 2022-07-14
Attending: ANESTHESIOLOGY
Payer: COMMERCIAL

## 2022-07-14 ENCOUNTER — TELEPHONE (OUTPATIENT)
Dept: PAIN MEDICINE | Facility: CLINIC | Age: 62
End: 2022-07-14
Payer: COMMERCIAL

## 2022-07-14 DIAGNOSIS — M54.9 DORSALGIA, UNSPECIFIED: ICD-10-CM

## 2022-07-14 PROCEDURE — 72148 MRI LUMBAR SPINE W/O DYE: CPT | Mod: 26,,, | Performed by: RADIOLOGY

## 2022-07-14 PROCEDURE — 72148 MRI LUMBAR SPINE WITHOUT CONTRAST: ICD-10-PCS | Mod: 26,,, | Performed by: RADIOLOGY

## 2022-07-14 PROCEDURE — 72148 MRI LUMBAR SPINE W/O DYE: CPT | Mod: TC

## 2022-07-14 NOTE — TELEPHONE ENCOUNTER
Procedure on 7/15/22   Right sacroiliac joint + GT bursa injection with RN IV sedation  Auth code: [id number]  valid through: Jul 13/2022- Jan 9/2023      Vincent Lino   Medical Assistant

## 2022-07-15 ENCOUNTER — TELEPHONE (OUTPATIENT)
Dept: PAIN MEDICINE | Facility: CLINIC | Age: 62
End: 2022-07-15
Payer: COMMERCIAL

## 2022-07-15 NOTE — TELEPHONE ENCOUNTER
Called pt to reshedule  their procedure because it is still not approved. Pt answered and procedure has been made on July 20. Inform pt on the procedure instruction. Pt  does not need to stop any blood thinners. Pt understood and all question answered.     Vincent Lino   Medical Assistant

## 2022-07-15 NOTE — TELEPHONE ENCOUNTER
Correction: [facility]CHELL [facility] telephone. Per Cesilia the Auth#[id number] was APPROVED only with the cpt code 59121 (MRI of the spine) RADIOLOGY ONLY.    Vincent Lino   Medical Assistant

## 2022-07-19 ENCOUNTER — PATIENT MESSAGE (OUTPATIENT)
Dept: PAIN MEDICINE | Facility: HOSPITAL | Age: 62
End: 2022-07-19
Payer: COMMERCIAL

## 2022-07-19 ENCOUNTER — TELEPHONE (OUTPATIENT)
Dept: PAIN MEDICINE | Facility: CLINIC | Age: 62
End: 2022-07-19
Payer: COMMERCIAL

## 2022-07-19 NOTE — TELEPHONE ENCOUNTER
Attempt to reach patient to reschedule his procedure because it is not approved by his insurance. A peer to peer will be held on 7/21/22.  Case was dragged back into the depot. Patient did not answer. Left message on patients voice mail to call back at earliest convenience.     Vincent Lino  Medical Assistant

## 2022-07-19 NOTE — TELEPHONE ENCOUNTER
----- Message from Yordy Soler sent at 7/19/2022  8:58 AM CDT -----  Contact: pt  Is scheduled for an injection tomorrow 07/20 and hasn't gotten an approval and contacted the insurance co and was informed that more information from the Dr is needed and is wanting to discuss/ pt has taken off and can be reached at [phone] and the portal /thanks/dbw

## 2022-07-21 ENCOUNTER — TELEPHONE (OUTPATIENT)
Dept: PAIN MEDICINE | Facility: CLINIC | Age: 62
End: 2022-07-21
Payer: COMMERCIAL

## 2022-07-21 NOTE — TELEPHONE ENCOUNTER
Inform pt that I called [employer] to do a peer to peer. Spoke to Ms.[email or web address]. She stated that we are past the p2p period and we will have to file for an appeal.  will need to send over her office notes to the insurance company.  Called the appeal line and left a message. They will contact us with 24 hours to see if he is eligable for one. Pt understood and state that he will call the insuarance to see if he can do anything. Pt inform me that he will contact our department if so. Pt has been dragged back into the depot. Will contact pt with more information.       Vincent Lino   Medical Assistant

## 2022-07-21 NOTE — TELEPHONE ENCOUNTER
Bed: B10  Expected date: 4/16/18  Expected time: 6:56 AM  [facility]  Comments:  81 M CP A/O x 4. 124/64 90 12  ASA, home NTG.    Called [employer] fernandez to do a peer to peer. Spoke to . She stated that we are past the p2p period and we will gave to fill for an appeal.  will galindo to send over her office notes. Will construct appeal today.   [phone]  Fax [phone]    Vincent Lino   Medical Assistant

## 2022-07-28 ENCOUNTER — OFFICE VISIT (OUTPATIENT)
Dept: UROLOGY | Facility: CLINIC | Age: 62
End: 2022-07-28
Payer: COMMERCIAL

## 2022-07-28 VITALS
WEIGHT: 261.25 LBS | SYSTOLIC BLOOD PRESSURE: 155 MMHG | BODY MASS INDEX: 35.43 KG/M2 | TEMPERATURE: 98 F | DIASTOLIC BLOOD PRESSURE: 91 MMHG | HEART RATE: 97 BPM

## 2022-07-28 DIAGNOSIS — R35.1 NOCTURIA: Primary | ICD-10-CM

## 2022-07-28 DIAGNOSIS — R39.12 BENIGN PROSTATIC HYPERPLASIA WITH WEAK URINARY STREAM: ICD-10-CM

## 2022-07-28 DIAGNOSIS — N40.1 BENIGN PROSTATIC HYPERPLASIA WITH WEAK URINARY STREAM: ICD-10-CM

## 2022-07-28 DIAGNOSIS — N32.81 OAB (OVERACTIVE BLADDER): ICD-10-CM

## 2022-07-28 PROCEDURE — 99214 OFFICE O/P EST MOD 30 MIN: CPT | Mod: S$GLB,,, | Performed by: UROLOGY

## 2022-07-28 PROCEDURE — 99999 PR PBB SHADOW E&M-EST. PATIENT-LVL IV: CPT | Mod: PBBFAC,,, | Performed by: UROLOGY

## 2022-07-28 PROCEDURE — 99999 PR PBB SHADOW E&M-EST. PATIENT-LVL IV: ICD-10-PCS | Mod: PBBFAC,,, | Performed by: UROLOGY

## 2022-07-28 PROCEDURE — 99214 PR OFFICE/OUTPT VISIT, EST, LEVL IV, 30-39 MIN: ICD-10-PCS | Mod: S$GLB,,, | Performed by: UROLOGY

## 2022-07-28 NOTE — PROGRESS NOTES
Chief Complaint:   Slow stream    HPI:   07/28/2022 - returns for follow-up, vesicare working but only for the daytime urgency and frequency, nocturia still very present, takes the med in the AM, stream also improved, notes +history of DA, was on a CPAP but [facility] had a recall and he has not been using one for sometime, though he does admit that his urinary issues were present prior, no GH or dysuria    06/09/2022 - returns today for follow-up, was started on Flomax by NP Rushing at his last visit, patient states that initially it helped but he has had recurrence of symptoms, has urgency and infrequent urge incontinence, uses a paper towel his pants that he does not soak through his underwear, also notes a slow stream, as well as nocturia times 8-10, denies any gross hematuria, admits that he does not drink enough fluids, may have one cup of coffee in the morning and nothing else during the day until the evening time, he does not restrict fluids prior to bed, denies gross hematuria or dysuria, concerned that he will have to start wearing diapers as he is going on a trip to [place] soon    Patient is a 61-year-old male that is presenting with a slow stream and nocturia.  Patient was seen by primary care provider and has a PSA lab schedule today.  Urine in clinic is negative.  PVR is low, 60 mL.  No BPH meds.   No abd/pelvic pain and no exac/rel factors.  No hematuria.  No urolithiasis.      Allergies:  Patient has no known allergies.    Medications:  has a current medication list which includes the following prescription(s): acetaminophen, b complex vitamins, cholecalciferol (vitamin d3), cyanocobalamin (vitamin b-12), herbal drugs, multivitamin, pregabalin, solifenacin, terbinafine hcl, and truvada.    Review of Systems:  General: No fever, chills  Skin: No rashes  Chest:  Denies cough and sputum production  Heart: Denies chest pain  Resp: Denies dyspnea  Abdomen: Denies diarrhea, abdominal pain, hematemesis, or  blood in stool.  Musculoskeletal: No joint stiffness or swelling. Denies back pain.  : see HPI  Neuro: no dizziness or weakness      PMH:   has a past medical history of Acid reflux, Arthralgia, Glaucoma, Hypertension, and Sjogren's syndrome.    PSH:   has a past surgical history that includes Back surgery.    FamHx: family history includes Cancer in his maternal grandmother and mother; Diabetes Mellitus in his maternal grandmother; Glaucoma in his maternal grandmother; Rheum arthritis in his maternal grandmother.    SocHx:  reports that he has never smoked. He has never used smokeless tobacco. He reports that he does not drink alcohol and does not use drugs.      Physical Exam:  Vitals:    07/28/22 1608   BP: (!) 155/91   Pulse: 97   Temp: 98 °F (36.7 °C)     General: awake, alert, cooperative  Head: NC/AT  Ears: external ears normal  Eyes: sclera normal  Lungs: normal inspiration, NAD  Heart: well-perfused  Skin: The skin is warm and dry  Ext: No c/c/e.  Neuro: grossly intact, AOx3   4/22: Test desc jemma, no abnormalities of epididymus. Penis  with normal penile and scrotal skin. Meatus normal. Normal rectal tone, no hemorrhoids. Prost 35 gm no nodules or masses appreciated. SV not palpable. Perineum and anus normal.    Labs/Studies:   Lab Results   Component Value Date    WBC 4.16 02/09/2022    HGB 14.3 02/09/2022    HCT 42.9 02/09/2022     02/09/2022     (L) 02/09/2022    K 4.3 02/09/2022     02/09/2022    CREATININE 1.4 02/09/2022    BUN 14 02/09/2022    CO2 27 02/09/2022    TSH 1.536 04/26/2022    PSA 3.2 04/26/2022    HGBA1C 5.5 07/06/2017    CHOL 181 04/26/2022    TRIG 86 04/26/2022    HDL 37 (L) 04/26/2022    ALT 19 04/26/2022    AST 22 04/26/2022     Impression/Plan:   Urgency - improved with vesicare, continue this    Weak stream - likely due to overactive bladder and low bladder volumes verses BPH, continue Flomax for now    Nocturia - possibly related to DA, obtain voiding diary,  also take vesicare at night    - f/u 6 weeks    Abdi Grijalva MD

## 2022-08-09 ENCOUNTER — OFFICE VISIT (OUTPATIENT)
Dept: INFECTIOUS DISEASES | Facility: CLINIC | Age: 62
End: 2022-08-09
Payer: COMMERCIAL

## 2022-08-09 ENCOUNTER — LAB VISIT (OUTPATIENT)
Dept: LAB | Facility: HOSPITAL | Age: 62
End: 2022-08-09
Attending: INTERNAL MEDICINE
Payer: COMMERCIAL

## 2022-08-09 VITALS
HEIGHT: 72 IN | DIASTOLIC BLOOD PRESSURE: 94 MMHG | SYSTOLIC BLOOD PRESSURE: 164 MMHG | WEIGHT: 261.25 LBS | HEART RATE: 85 BPM | BODY MASS INDEX: 35.38 KG/M2

## 2022-08-09 DIAGNOSIS — G47.33 OSA ON CPAP: ICD-10-CM

## 2022-08-09 DIAGNOSIS — Z79.899 ON PRE-EXPOSURE PROPHYLAXIS FOR HIV: ICD-10-CM

## 2022-08-09 DIAGNOSIS — Z79.899 ON PRE-EXPOSURE PROPHYLAXIS FOR HIV: Primary | ICD-10-CM

## 2022-08-09 PROCEDURE — 99214 OFFICE O/P EST MOD 30 MIN: CPT | Mod: S$GLB,,, | Performed by: INTERNAL MEDICINE

## 2022-08-09 PROCEDURE — 99999 PR PBB SHADOW E&M-EST. PATIENT-LVL III: CPT | Mod: PBBFAC,,, | Performed by: INTERNAL MEDICINE

## 2022-08-09 PROCEDURE — 87389 HIV-1 AG W/HIV-1&-2 AB AG IA: CPT | Performed by: INTERNAL MEDICINE

## 2022-08-09 PROCEDURE — 99999 PR PBB SHADOW E&M-EST. PATIENT-LVL III: ICD-10-PCS | Mod: PBBFAC,,, | Performed by: INTERNAL MEDICINE

## 2022-08-09 PROCEDURE — 99214 PR OFFICE/OUTPT VISIT, EST, LEVL IV, 30-39 MIN: ICD-10-PCS | Mod: S$GLB,,, | Performed by: INTERNAL MEDICINE

## 2022-08-09 PROCEDURE — 36415 COLL VENOUS BLD VENIPUNCTURE: CPT | Performed by: INTERNAL MEDICINE

## 2022-08-09 RX ORDER — TAMSULOSIN HYDROCHLORIDE 0.4 MG/1
1 CAPSULE ORAL DAILY
COMMUNITY
Start: 2022-07-20 | End: 2023-04-05 | Stop reason: SDUPTHER

## 2022-08-09 NOTE — PROGRESS NOTES
Subjective:       Patient ID: Bradley Petty Jr. is a 61 y.o. male.    Chief Complaint: Follow up  HPI   61 year old man on Truvada for PREP.  He reports compliance with therapy .  He has not done labs since last visit .  He reports compliance to therapy .     Review of Systems   Constitutional: Negative for activity change, appetite change, chills, diaphoresis and fatigue.   HENT: Negative for nasal congestion, dental problem, ear discharge, ear pain and facial swelling.    Eyes: Negative for pain, discharge and itching.   Respiratory: Negative for apnea, cough, chest tightness and shortness of breath.    Cardiovascular: Negative for chest pain and leg swelling.   Gastrointestinal: Negative for abdominal distention and abdominal pain.   Endocrine: Negative for cold intolerance, heat intolerance and polydipsia.   Genitourinary: Negative for difficulty urinating, dysuria and enuresis.   Musculoskeletal: Negative for arthralgias and back pain.   Integumentary:  Negative for color change and pallor.   Allergic/Immunologic: Negative for environmental allergies and food allergies.   Neurological: Negative for dizziness, facial asymmetry, light-headedness and headaches.   Hematological: Negative for adenopathy. Does not bruise/bleed easily.   Psychiatric/Behavioral: Negative for agitation and behavioral problems.         Objective:      Physical Exam  Vitals and nursing note reviewed.   Constitutional:       Appearance: He is well-developed.   HENT:      Head: Normocephalic and atraumatic.      Nose: Nose normal.   Eyes:      Pupils: Pupils are equal, round, and reactive to light.   Cardiovascular:      Rate and Rhythm: Normal rate and regular rhythm.      Heart sounds: Normal heart sounds.   Pulmonary:      Effort: Pulmonary effort is normal. No respiratory distress.      Breath sounds: Normal breath sounds. No wheezing or rales.   Abdominal:      General: Abdomen is flat. There is no distension.      Tenderness: There  is no abdominal tenderness.   Musculoskeletal:         General: Normal range of motion.      Cervical back: Normal range of motion and neck supple.   Neurological:      Mental Status: He is alert and oriented to person, place, and time.   Psychiatric:         Mood and Affect: Mood normal.         Assessment:         1. On pre-exposure prophylaxis for HIV  HIV 1/2 Ag/Ab (4th Gen)   2. DA on CPAP         Plan:       Problem List Items Addressed This Visit     DA on CPAP     CPAP as tolerated            On pre-exposure prophylaxis for HIV - Primary     Will continue  Truvada , will check HIV antibody  today .  He is on the generic version .           Relevant Orders    HIV 1/2 Ag/Ab (4th Gen)

## 2022-08-10 LAB — HIV 1+2 AB+HIV1 P24 AG SERPL QL IA: NEGATIVE

## 2022-08-22 ENCOUNTER — TELEPHONE (OUTPATIENT)
Dept: PAIN MEDICINE | Facility: CLINIC | Age: 62
End: 2022-08-22
Payer: COMMERCIAL

## 2022-08-22 NOTE — TELEPHONE ENCOUNTER
Called pt to set up their procedure.inorm pt that he procedure is not approved and I will contact the preserve department to see why, before adding him on the snapboard. I told pt that I will give him a call back with more information.   Pt is not on any blood thinners. Pt understood and all question answered.     Vincent Lino   Medical Assistant

## 2022-08-22 NOTE — TELEPHONE ENCOUNTER
----- Message from Yancy Vargas sent at 8/22/2022  9:21 AM CDT -----  Contact: self- [phone]  Patient would like to schedule his injection, Please callback Thanks Sj

## 2022-08-23 ENCOUNTER — TELEPHONE (OUTPATIENT)
Dept: PAIN MEDICINE | Facility: CLINIC | Age: 62
End: 2022-08-23
Payer: COMMERCIAL

## 2022-08-23 NOTE — TELEPHONE ENCOUNTER
Attempt to reach patient to schedule his procedure because it is approved. Patient did not answer. Left message on patients voice mail to call back at earliest convenience to schedule.  Auth# [id number]      Vincent Lino  Medical Assistant

## 2022-08-24 ENCOUNTER — PATIENT MESSAGE (OUTPATIENT)
Dept: INFECTIOUS DISEASES | Facility: CLINIC | Age: 62
End: 2022-08-24
Payer: COMMERCIAL

## 2022-08-25 ENCOUNTER — PATIENT MESSAGE (OUTPATIENT)
Dept: INFECTIOUS DISEASES | Facility: CLINIC | Age: 62
End: 2022-08-25
Payer: COMMERCIAL

## 2022-08-25 ENCOUNTER — TELEPHONE (OUTPATIENT)
Dept: INFECTIOUS DISEASES | Facility: CLINIC | Age: 62
End: 2022-08-25
Payer: COMMERCIAL

## 2022-08-25 NOTE — TELEPHONE ENCOUNTER
----- Message from Namrata Acosta sent at 8/25/2022  8:21 AM CDT -----  Contact: [phone]  Patient is requesting the doctor to call him back at [phone].Thanks

## 2022-08-25 NOTE — TELEPHONE ENCOUNTER
----- Message from Namrata Acosta sent at 8/25/2022  8:21 AM CDT -----  Contact: [phone]  Patient is requesting the doctor to call him back at [phone].Thanks

## 2022-08-26 ENCOUNTER — TELEPHONE (OUTPATIENT)
Dept: PAIN MEDICINE | Facility: CLINIC | Age: 62
End: 2022-08-26
Payer: COMMERCIAL

## 2022-08-26 NOTE — PRE-PROCEDURE INSTRUCTIONS
Spoke with patient regarding procedure scheduled on 8.29    Arrival time 0700    Has patient been sick with fever or on antibiotics within the last 7 days? No    Does the patient have any open wounds, sores or rashes? No    Does the patient have any recent fractures? no    Has patient received a vaccination within the last 7 days? No    Received the COVID vaccination? yes    Has the patient stopped all medications as directed? na    Does patient have a pacemaker and or defibrillator? no    Does the patient have a ride to and from procedure and someone reliable to remain with patient? sister    Is the patient diabetic? no    Does the patient have sleep apnea? Or use O2 at home? darrell on cpap    Is the patient receiving sedation? yes    Is the patient instructed to remain NPO beginning at midnight the night before their procedure? yes    Procedure location confirmed with patient? Yes    Covid- Denies signs/symptoms. Instructed to notify PAT/MD if any changes.

## 2022-08-26 NOTE — TELEPHONE ENCOUNTER
Called pt to set up their procedure. Pt answered and procedure has been made. Inform pt on the procedure instruction. Pt  does not take any blood thinners. Pt understood and all question answered.     Vincent Lino   Medical Assistant

## 2022-08-28 ENCOUNTER — NURSE TRIAGE (OUTPATIENT)
Dept: ADMINISTRATIVE | Facility: CLINIC | Age: 62
End: 2022-08-28
Payer: COMMERCIAL

## 2022-08-28 NOTE — TELEPHONE ENCOUNTER
Pt c/o back pain 8/10. Pt scheduled for procedure on 8/29. Pt cancelled triage and stated that he will wait to address pain tomorrow for surgery. Pt requested arrival time. Chart reviewed and arrival time at 0700 noted. Verbalized understanding. Encounter routed to provider.      Reason for Disposition   Question about upcoming scheduled test, no triage required and triager able to answer question    Additional Information   Negative: Passed out (i.e., lost consciousness, collapsed and was not responding)   Negative: Shock suspected (e.g., cold/pale/clammy skin, too weak to stand, low BP, rapid pulse)   Negative: Sounds like a life-threatening emergency to the triager   Negative: [1] SEVERE back pain (e.g., excruciating) AND [2] sudden onset AND [3] age > 60 years    Protocols used: Back Pain-A-[facility], Information Only Call-A-AH

## 2022-08-29 ENCOUNTER — HOSPITAL ENCOUNTER (OUTPATIENT)
Facility: HOSPITAL | Age: 62
Discharge: HOME | End: 2022-08-29
Attending: ANESTHESIOLOGY | Admitting: ANESTHESIOLOGY
Payer: COMMERCIAL

## 2022-08-29 VITALS
RESPIRATION RATE: 18 BRPM | BODY MASS INDEX: 35.23 KG/M2 | HEIGHT: 72 IN | WEIGHT: 260.13 LBS | DIASTOLIC BLOOD PRESSURE: 80 MMHG | TEMPERATURE: 98 F | HEART RATE: 82 BPM | OXYGEN SATURATION: 100 % | SYSTOLIC BLOOD PRESSURE: 130 MMHG

## 2022-08-29 DIAGNOSIS — M46.1 SACROILIITIS: Primary | ICD-10-CM

## 2022-08-29 PROCEDURE — 25500020 PHARM REV CODE 255: Performed by: ANESTHESIOLOGY

## 2022-08-29 PROCEDURE — 20610 PR DRAIN/INJECT LARGE JOINT/BURSA: ICD-10-PCS | Mod: RT,,, | Performed by: ANESTHESIOLOGY

## 2022-08-29 PROCEDURE — 25000003 PHARM REV CODE 250: Performed by: ANESTHESIOLOGY

## 2022-08-29 PROCEDURE — 27096 INJECT SACROILIAC JOINT: CPT | Mod: 59,RT,, | Performed by: ANESTHESIOLOGY

## 2022-08-29 PROCEDURE — 20610 DRAIN/INJ JOINT/BURSA W/O US: CPT | Performed by: ANESTHESIOLOGY

## 2022-08-29 PROCEDURE — 27096 INJECT SACROILIAC JOINT: CPT | Performed by: ANESTHESIOLOGY

## 2022-08-29 PROCEDURE — 20610 DRAIN/INJ JOINT/BURSA W/O US: CPT | Mod: RT,,, | Performed by: ANESTHESIOLOGY

## 2022-08-29 PROCEDURE — A9585 GADOBUTROL INJECTION: HCPCS | Performed by: ANESTHESIOLOGY

## 2022-08-29 PROCEDURE — 27096 PR INJECTION,SACROILIAC JOINT: ICD-10-PCS | Mod: 59,RT,, | Performed by: ANESTHESIOLOGY

## 2022-08-29 PROCEDURE — 63600175 PHARM REV CODE 636 W HCPCS: Performed by: ANESTHESIOLOGY

## 2022-08-29 RX ORDER — TRIAMCINOLONE ACETONIDE 40 MG/ML
INJECTION, SUSPENSION INTRA-ARTICULAR; INTRAMUSCULAR
Status: DISCONTINUED | OUTPATIENT
Start: 2022-08-29 | End: 2022-08-29 | Stop reason: HOSPADM

## 2022-08-29 RX ORDER — INDOMETHACIN 25 MG/1
CAPSULE ORAL
Status: DISCONTINUED | OUTPATIENT
Start: 2022-08-29 | End: 2022-08-29 | Stop reason: HOSPADM

## 2022-08-29 RX ORDER — BUPIVACAINE HYDROCHLORIDE 2.5 MG/ML
INJECTION, SOLUTION EPIDURAL; INFILTRATION; INTRACAUDAL
Status: DISCONTINUED | OUTPATIENT
Start: 2022-08-29 | End: 2022-08-29 | Stop reason: HOSPADM

## 2022-08-29 RX ORDER — MIDAZOLAM HYDROCHLORIDE 1 MG/ML
INJECTION, SOLUTION INTRAMUSCULAR; INTRAVENOUS
Status: DISCONTINUED | OUTPATIENT
Start: 2022-08-29 | End: 2022-08-29 | Stop reason: HOSPADM

## 2022-08-29 RX ORDER — FENTANYL CITRATE 50 UG/ML
INJECTION, SOLUTION INTRAMUSCULAR; INTRAVENOUS
Status: DISCONTINUED | OUTPATIENT
Start: 2022-08-29 | End: 2022-08-29 | Stop reason: HOSPADM

## 2022-08-29 RX ORDER — GADOBUTROL 604.72 MG/ML
INJECTION INTRAVENOUS
Status: DISCONTINUED | OUTPATIENT
Start: 2022-08-29 | End: 2022-08-29 | Stop reason: HOSPADM

## 2022-08-29 NOTE — H&P
HPI  Patient presenting for Procedure(s) (LRB):  Right sacroiliac joint + GT bursa injection with RN IV sedation (Right)     Patient on Anti-coagulation No    No health changes since previous encounter    Past Medical History:   Diagnosis Date    Acid reflux     Arthralgia     Glaucoma     Hypertension     Sjogren's syndrome      Past Surgical History:   Procedure Laterality Date    BACK SURGERY       Review of patient's allergies indicates:  No Known Allergies     No current facility-administered medications on file prior to encounter.     Current Outpatient Medications on File Prior to Encounter   Medication Sig Dispense Refill    acetaminophen (TYLENOL) 500 mg Cap Take 1,000 mg by mouth once daily.      b complex vitamins tablet Take 1 tablet by mouth Daily.      cholecalciferol, vitamin D3, 5,000 unit capsule Take 5,000 Units by mouth Daily.      cyanocobalamin, vitamin B-12, 5,000 mcg TbDL Take 1 tablet by mouth once daily.      HERBAL DRUGS (COLON HERBAL CLEANSER ORAL) Take by mouth once daily.       multivitamin capsule Take 1 capsule by mouth Daily.      pregabalin (LYRICA) 75 MG capsule Take 1 capsule, 75 mg, once daily for 1 week.  Followed by take 1 capsule 75 mg twice daily for 1 week.  Followed by 2 capsules, 150 mg in the morning and 75 mg at night for 1 week.  Followed by 150 mg twice daily for 1 week.  Followed by 225 mg in the morning and 150 mg in the evening for 1 week.  Followed by 225 mg b.i.d.. 147 capsule 0    solifenacin (VESICARE) 5 MG tablet Take 1 tablet (5 mg total) by mouth once daily. 30 tablet 11    terbinafine HCL (LAMISIL) 250 mg tablet TAKE 1 TABLET BY MOUTH ONCE DAILY 45 tablet 0    TRUVADA 200-300 mg Tab TAKE 1 TABLET BY MOUTH EVERY DAY 30 tablet 5        PMHx, PSHx, Allergies, Medications reviewed in epic    ROS negative except pain complaints in HPI    OBJECTIVE:    There were no vitals taken for this visit.    PHYSICAL EXAMINATION:    GENERAL: Well appearing, in no acute  distress, alert and oriented x3.  PSYCH:  Mood and affect appropriate.  SKIN: Skin color, texture, turgor normal, no rashes or lesions which will impact the procedure.  CV: RRR with palpation of the radial artery.  PULM: No evidence of respiratory difficulty, symmetric chest rise. Clear to auscultation.  NEURO: Cranial nerves grossly intact.    Plan:    Proceed with procedure as planned Procedure(s) (LRB):  Right sacroiliac joint + GT bursa injection with RN IV sedation (Right)    Tiffany Diaz MD  08/29/2022

## 2022-08-29 NOTE — PLAN OF CARE
Pt discharged home, awake, alert, oriented x's 4,  denies any pain, no apparent distress noted. All questions and concerns addressed and answered, pt verbalizes understanding of discharge process, pt meets discharge criteria and is being discharged to car via wheelchair.

## 2022-08-29 NOTE — OP NOTE
Bradley Petty Jr.  61 y.o. male      Vitals:    08/29/22 0758   BP: (!) 154/92   Pulse: 81   Resp: 18   Temp: 98 °F (36.7 °C)       Procedure Date:8/29/22      INFORMED CONSENT: The procedure, risks, benefits and options were discussed with patient. There are no contraindications to the procedure. The patient expressed understanding and agreed to proceed. The personnel performing the procedure was discussed. I verify that I personally obtained consent prior to the start of the procedure and the signed consent can be found on the patient's chart.       Anesthesia:   Conscious sedation provided by M.D    The patient was monitored with continuous pulse oximetry, EKG, and intermittent blood pressure monitors.  The patient was hemodynamically stable throughout the entire process was responsive to voice, and breathing spontaneously.  Supplemental O2 was provided at 2L/min via nasal cannula.  Patient was comfortable for the duration of the procedure. (See nurse documentation and case log for sedation time)    There was a total of 2mg IV Midazolam and 50mcg Fentanyl titrated for the procedure    Pre Procedure diagnosis: Sacroiliitis [M46.1]  Post-Procedure diagnosis: SAME      PROCEDURE:  1) Right greater trochanteric bursa injection    2) Right sacroiliac joint injection                            REASON FOR PROCEDURE:   Sacroiliitis [M46.1]  Greater trochanteric bursitis[M70.61]      MEDICATIONS INJECTED: 1mL 40mg/ml Kenalog and 4mL Bupivacaine 0.25% into each site    LOCAL ANESTHETIC USED: Xylocaine 1% 6ml     ESTIMATED BLOOD LOSS: None.   COMPLICATIONS: None.     TECHNIQUE:   Greater trochanteric bursa injection:  The area overlying the greater trochanteric bursa was identified using fluoroscopy, and the area overlying the skin was prepped and draped in usual sterile fashion. Local Xylocaine was injected by raising a wheel and going down to the periosteum using a 27-gauge hypodermic needle. A 5 inch 22-gauge spinal  needle was introduce into the Right greater trochanteric bursa. Negative pressure applied to confirm no intravascular placement. Omnipaque was injected to confirm placement and to confirm that there was no vascular runoff. The medication was then injected slowly.  Displacement of the contrast after injection of the medication confirmed that the medication went into the area of the greater trochanteric bursa    Sacroiliac joint injection:   Laying in the prone position, the patient was prepped and draped in the usual sterile fashion using ChloraPrep and fenestrated drape.  The area was determined under fluoroscopy.  Local Xylocaine was injected by raising a wheel and going down to the periosteum using a 27-gauge hypodermic needle.  The 3.5 inch 22-gauge spinal needle was introduce into the Right sacroiliac joint.  Negative pressure applied to confirm no intravascular placement.  Omnipaque was injected to confirm placement and to confirm that there was no vascular runoff.  The medication was then injected slowly.  The patient tolerated the procedure well.                       The patient was monitored for approximately 30 minutes after the procedure. Patient was given post procedure and discharge instructions to follow at home. We will see the patient back in two weeks or the patient may call to inform of status. The patient was discharged in a stable condition

## 2022-08-29 NOTE — DISCHARGE INSTRUCTIONS

## 2022-08-29 NOTE — DISCHARGE SUMMARY
Discharge Note  Short Stay      SUMMARY     Admit Date: 8/29/2022    Attending Physician: Tiffany Diaz MD        Discharge Physician: Tiffany Diaz MD        Discharge Date: 8/29/2022 8:35 AM    Procedure(s) (LRB):  Right sacroiliac joint + GT bursa injection with RN IV sedation (Right)    Final Diagnosis: Sacroiliitis [M46.1]    Disposition: Home or self care    Patient Instructions:   Current Discharge Medication List        CONTINUE these medications which have NOT CHANGED    Details   b complex vitamins tablet Take 1 tablet by mouth Daily.      cholecalciferol, vitamin D3, 5,000 unit capsule Take 5,000 Units by mouth Daily.      cyanocobalamin, vitamin B-12, 5,000 mcg TbDL Take 1 tablet by mouth once daily.      pregabalin (LYRICA) 75 MG capsule Take 1 capsule, 75 mg, once daily for 1 week.  Followed by take 1 capsule 75 mg twice daily for 1 week.  Followed by 2 capsules, 150 mg in the morning and 75 mg at night for 1 week.  Followed by 150 mg twice daily for 1 week.  Followed by 225 mg in the morning and 150 mg in the evening for 1 week.  Followed by 225 mg b.i.d..  Qty: 147 capsule, Refills: 0      solifenacin (VESICARE) 5 MG tablet Take 1 tablet (5 mg total) by mouth once daily.  Qty: 30 tablet, Refills: 11    Associated Diagnoses: Frequency of micturition      tamsulosin (FLOMAX) 0.4 mg Cap Take 1 capsule by mouth once daily.      TRUVADA 200-300 mg Tab TAKE 1 TABLET BY MOUTH EVERY DAY  Qty: 30 tablet, Refills: 5    Comments: 1 in BILLIE      acetaminophen (TYLENOL) 500 mg Cap Take 1,000 mg by mouth once daily.      HERBAL DRUGS (COLON HERBAL CLEANSER ORAL) Take by mouth once daily.       multivitamin capsule Take 1 capsule by mouth Daily.      terbinafine HCL (LAMISIL) 250 mg tablet TAKE 1 TABLET BY MOUTH ONCE DAILY  Qty: 45 tablet, Refills: 0    Associated Diagnoses: Onychomycosis                 Discharge Diagnosis: Sacroiliitis [M46.1]  Condition on Discharge: Stable with no complications to procedure    Diet on Discharge: Same as before.  Activity: as per instruction sheet.  Discharge to: Home with a responsible adult.  Follow up: 2-4 weeks       Please call the office at [phone] if you experience any weakness or loss of sensation, fever > 101.5, pain uncontrolled with oral medications, persistent nausea/vomiting/or diarrhea, redness or drainage from the incisions, or any other worrisome concerns. If physician on call was not reached or could not communicate with our office for any reason please go to the nearest emergency department

## 2022-08-30 DIAGNOSIS — B35.1 ONYCHOMYCOSIS: ICD-10-CM

## 2022-08-30 RX ORDER — TERBINAFINE HYDROCHLORIDE 250 MG/1
TABLET ORAL
Qty: 45 TABLET | Refills: 0 | OUTPATIENT
Start: 2022-08-30

## 2022-09-07 ENCOUNTER — OFFICE VISIT (OUTPATIENT)
Dept: UROLOGY | Facility: CLINIC | Age: 62
End: 2022-09-07
Payer: COMMERCIAL

## 2022-09-07 VITALS
HEART RATE: 84 BPM | SYSTOLIC BLOOD PRESSURE: 142 MMHG | BODY MASS INDEX: 35.21 KG/M2 | HEIGHT: 72 IN | TEMPERATURE: 98 F | WEIGHT: 260 LBS | DIASTOLIC BLOOD PRESSURE: 89 MMHG

## 2022-09-07 DIAGNOSIS — R35.0 URINARY FREQUENCY: Primary | ICD-10-CM

## 2022-09-07 DIAGNOSIS — N32.81 OAB (OVERACTIVE BLADDER): ICD-10-CM

## 2022-09-07 PROCEDURE — 99214 PR OFFICE/OUTPT VISIT, EST, LEVL IV, 30-39 MIN: ICD-10-PCS | Mod: S$GLB,,, | Performed by: UROLOGY

## 2022-09-07 PROCEDURE — 99214 OFFICE O/P EST MOD 30 MIN: CPT | Mod: S$GLB,,, | Performed by: UROLOGY

## 2022-09-07 PROCEDURE — 99999 PR PBB SHADOW E&M-EST. PATIENT-LVL IV: CPT | Mod: PBBFAC,,, | Performed by: UROLOGY

## 2022-09-07 PROCEDURE — 99999 PR PBB SHADOW E&M-EST. PATIENT-LVL IV: ICD-10-PCS | Mod: PBBFAC,,, | Performed by: UROLOGY

## 2022-09-07 NOTE — PROGRESS NOTES
Chief Complaint:   Slow stream    HPI:   09/07/2022 - patient returns today for follow-up, is still taking the VESIcare, but notes that his urgency has returned, still waking up many times per night, states that he is now filling up the urinal, states that he filled out the voiding diary but forgot to bring it for review, denies any gross hematuria or dysuria, does not restrict fluids prior to bed and will sip on fluids when he wakes up at night, has not yet received his replacement CPAP    07/28/2022 - returns for follow-up, vesicare working but only for the daytime urgency and frequency, nocturia still very present, takes the med in the AM, stream also improved, notes +history of DA, was on a CPAP but [facility] had a recall and he has not been using one for sometime, though he does admit that his urinary issues were present prior, no GH or dysuria    06/09/2022 - returns today for follow-up, was started on Flomax by NP Rushing at his last visit, patient states that initially it helped but he has had recurrence of symptoms, has urgency and infrequent urge incontinence, uses a paper towel his pants that he does not soak through his underwear, also notes a slow stream, as well as nocturia times 8-10, denies any gross hematuria, admits that he does not drink enough fluids, may have one cup of coffee in the morning and nothing else during the day until the evening time, he does not restrict fluids prior to bed, denies gross hematuria or dysuria, concerned that he will have to start wearing diapers as he is going on a trip to [place] soon    Patient is a 61-year-old male that is presenting with a slow stream and nocturia.  Patient was seen by primary care provider and has a PSA lab schedule today.  Urine in clinic is negative.  PVR is low, 60 mL.  No BPH meds.   No abd/pelvic pain and no exac/rel factors.  No hematuria.  No urolithiasis.      Allergies:  Patient has no known allergies.    Medications:  has a current  medication list which includes the following prescription(s): acetaminophen, b complex vitamins, cholecalciferol (vitamin d3), cyanocobalamin (vitamin b-12), herbal drugs, multivitamin, pregabalin, solifenacin, tamsulosin, terbinafine hcl, and truvada.    Review of Systems:  General: No fever, chills  Skin: No rashes  Chest:  Denies cough and sputum production  Heart: Denies chest pain  Resp: Denies dyspnea  Abdomen: Denies diarrhea, abdominal pain, hematemesis, or blood in stool.  Musculoskeletal: No joint stiffness or swelling. Denies back pain.  : see HPI  Neuro: no dizziness or weakness      PMH:   has a past medical history of Acid reflux, Arthralgia, Glaucoma, Hypertension, Sacroiliitis (8/29/2022), and Sjogren's syndrome.    PSH:   has a past surgical history that includes Back surgery and Injection of joint (Right, 8/29/2022).    FamHx: family history includes Cancer in his maternal grandmother and mother; Diabetes Mellitus in his maternal grandmother; Glaucoma in his maternal grandmother; Rheum arthritis in his maternal grandmother.    SocHx:  reports that he has never smoked. He has never used smokeless tobacco. He reports that he does not drink alcohol and does not use drugs.      Physical Exam:  Vitals:    09/07/22 1037   BP: (!) 142/89   Pulse: 84   Temp: 97.7 °F (36.5 °C)     General: awake, alert, cooperative  Head: NC/AT  Ears: external ears normal  Eyes: sclera normal  Lungs: normal inspiration, NAD  Heart: well-perfused  Skin: The skin is warm and dry  Ext: No c/c/e.  Neuro: grossly intact, AOx3   4/22: Test desc jemma, no abnormalities of epididymus. Penis  with normal penile and scrotal skin. Meatus normal. Normal rectal tone, no hemorrhoids. Prost 35 gm no nodules or masses appreciated. SV not palpable. Perineum and anus normal.    Labs/Studies:   Lab Results   Component Value Date    WBC 4.16 02/09/2022    HGB 14.3 02/09/2022    HCT 42.9 02/09/2022     02/09/2022     (L)  02/09/2022    K 4.3 02/09/2022     02/09/2022    CREATININE 1.4 02/09/2022    BUN 14 02/09/2022    CO2 27 02/09/2022    TSH 1.536 04/26/2022    PSA 3.2 04/26/2022    HGBA1C 5.5 07/06/2017    CHOL 181 04/26/2022    TRIG 86 04/26/2022    HDL 37 (L) 04/26/2022    ALT 19 04/26/2022    AST 22 04/26/2022     Impression/Plan:   Urgency - continue vesicare, increase fluids during the day    Weak stream - likely due to overactive bladder and low bladder volumes verses BPH, continue Flomax for now    Nocturia - limit fluids 2 hours prior to bed, recommend replacing his CPAP    - patient will send in his voiding diary for review electronically, f/u 6 weeks for review of repeat voiding diary    Abdi Grijalva MD

## 2022-09-28 ENCOUNTER — OFFICE VISIT (OUTPATIENT)
Dept: PAIN MEDICINE | Facility: CLINIC | Age: 62
End: 2022-09-28
Attending: ANESTHESIOLOGY
Payer: COMMERCIAL

## 2022-09-28 VITALS
DIASTOLIC BLOOD PRESSURE: 85 MMHG | BODY MASS INDEX: 36.07 KG/M2 | WEIGHT: 266.31 LBS | HEART RATE: 81 BPM | HEIGHT: 72 IN | SYSTOLIC BLOOD PRESSURE: 160 MMHG

## 2022-09-28 DIAGNOSIS — M47.818 ARTHROPATHY OF RIGHT SACROILIAC JOINT: ICD-10-CM

## 2022-09-28 DIAGNOSIS — M46.1 SACROILIITIS: Primary | ICD-10-CM

## 2022-09-28 DIAGNOSIS — M54.12 RIGHT CERVICAL RADICULOPATHY: ICD-10-CM

## 2022-09-28 DIAGNOSIS — M70.61 GREATER TROCHANTERIC BURSITIS, RIGHT: ICD-10-CM

## 2022-09-28 PROCEDURE — 99999 PR PBB SHADOW E&M-EST. PATIENT-LVL III: ICD-10-PCS | Mod: PBBFAC,,, | Performed by: PHYSICIAN ASSISTANT

## 2022-09-28 PROCEDURE — 99214 PR OFFICE/OUTPT VISIT, EST, LEVL IV, 30-39 MIN: ICD-10-PCS | Mod: S$GLB,,, | Performed by: PHYSICIAN ASSISTANT

## 2022-09-28 PROCEDURE — 99999 PR PBB SHADOW E&M-EST. PATIENT-LVL III: CPT | Mod: PBBFAC,,, | Performed by: PHYSICIAN ASSISTANT

## 2022-09-28 PROCEDURE — 99214 OFFICE O/P EST MOD 30 MIN: CPT | Mod: S$GLB,,, | Performed by: PHYSICIAN ASSISTANT
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
## 2022-10-20 NOTE — PRE-PROCEDURE INSTRUCTIONS
Spoke with patient regarding procedure scheduled on 10.21    Arrival time 0900    Has patient been sick with fever or on antibiotics within the last 7 days? No    Does the patient have any open wounds, sores or rashes? No    Does the patient have any recent fractures? no    Has patient received a vaccination within the last 7 days? No    Received the COVID vaccination? yes    Has the patient stopped all medications as directed? NA    Does patient have a pacemaker and or defibrillator? no    Does the patient have a ride to and from procedure and someone reliable to remain with patient? mother    Is the patient diabetic? no    Does the patient have sleep apnea? Or use O2 at home? No and no     Is the patient receiving sedation? yes    Is the patient instructed to remain NPO beginning at midnight the night before their procedure? yes    Procedure location confirmed with patient? Yes    Covid- Denies signs/symptoms. Instructed to notify PAT/MD if any changes.

## 2022-10-21 ENCOUNTER — HOSPITAL ENCOUNTER (OUTPATIENT)
Facility: HOSPITAL | Age: 62
Discharge: HOME | End: 2022-10-21
Attending: ANESTHESIOLOGY | Admitting: ANESTHESIOLOGY
Payer: COMMERCIAL

## 2022-10-21 VITALS
RESPIRATION RATE: 18 BRPM | HEIGHT: 72 IN | WEIGHT: 265 LBS | DIASTOLIC BLOOD PRESSURE: 90 MMHG | SYSTOLIC BLOOD PRESSURE: 149 MMHG | BODY MASS INDEX: 35.89 KG/M2 | TEMPERATURE: 98 F | HEART RATE: 82 BPM | OXYGEN SATURATION: 100 %

## 2022-10-21 DIAGNOSIS — M54.12 CERVICAL RADICULOPATHY: ICD-10-CM

## 2022-10-21 PROCEDURE — 25500020 PHARM REV CODE 255: Performed by: ANESTHESIOLOGY

## 2022-10-21 PROCEDURE — 25000003 PHARM REV CODE 250: Performed by: ANESTHESIOLOGY

## 2022-10-21 PROCEDURE — 62321 NJX INTERLAMINAR CRV/THRC: CPT | Mod: ,,, | Performed by: ANESTHESIOLOGY

## 2022-10-21 PROCEDURE — 62321 PR INJ CERV/THORAC, W/GUIDANCE: ICD-10-PCS | Mod: ,,, | Performed by: ANESTHESIOLOGY

## 2022-10-21 PROCEDURE — 63600175 PHARM REV CODE 636 W HCPCS: Performed by: ANESTHESIOLOGY

## 2022-10-21 PROCEDURE — 62321 NJX INTERLAMINAR CRV/THRC: CPT | Performed by: ANESTHESIOLOGY

## 2022-10-21 RX ORDER — MIDAZOLAM HYDROCHLORIDE 1 MG/ML
INJECTION, SOLUTION INTRAMUSCULAR; INTRAVENOUS
Status: DISCONTINUED | OUTPATIENT
Start: 2022-10-21 | End: 2022-10-21 | Stop reason: HOSPADM

## 2022-10-21 RX ORDER — FENTANYL CITRATE 50 UG/ML
INJECTION, SOLUTION INTRAMUSCULAR; INTRAVENOUS
Status: DISCONTINUED | OUTPATIENT
Start: 2022-10-21 | End: 2022-10-21 | Stop reason: HOSPADM

## 2022-10-21 RX ORDER — DEXAMETHASONE SODIUM PHOSPHATE 10 MG/ML
INJECTION INTRAMUSCULAR; INTRAVENOUS
Status: DISCONTINUED | OUTPATIENT
Start: 2022-10-21 | End: 2022-10-21 | Stop reason: HOSPADM

## 2022-10-21 RX ORDER — INDOMETHACIN 25 MG/1
CAPSULE ORAL
Status: DISCONTINUED | OUTPATIENT
Start: 2022-10-21 | End: 2022-10-21 | Stop reason: HOSPADM

## 2022-10-21 RX ORDER — BUPIVACAINE HYDROCHLORIDE 2.5 MG/ML
INJECTION, SOLUTION EPIDURAL; INFILTRATION; INTRACAUDAL
Status: DISCONTINUED | OUTPATIENT
Start: 2022-10-21 | End: 2022-10-21 | Stop reason: HOSPADM

## 2022-10-21 NOTE — DISCHARGE SUMMARY
Discharge Note  Short Stay      SUMMARY     Admit Date: 10/21/2022    Attending Physician: Tiffany Diaz MD        Discharge Physician: Tiffany Diaz MD        Discharge Date: 10/21/2022 10:54 AM    Procedure(s) (LRB):  C5/6 IL ALLIE w/RN IV Sedation (N/A)    Final Diagnosis: Right cervical radiculopathy [M54.12]    Disposition: Home or self care    Patient Instructions:   Current Discharge Medication List        CONTINUE these medications which have NOT CHANGED    Details   acetaminophen (TYLENOL) 500 mg Cap Take 1,000 mg by mouth once daily.      b complex vitamins tablet Take 1 tablet by mouth Daily.      cholecalciferol, vitamin D3, 5,000 unit capsule Take 5,000 Units by mouth Daily.      cyanocobalamin, vitamin B-12, 5,000 mcg TbDL Take 1 tablet by mouth once daily.      HERBAL DRUGS (COLON HERBAL CLEANSER ORAL) Take by mouth once daily.       multivitamin capsule Take 1 capsule by mouth Daily.      solifenacin (VESICARE) 5 MG tablet Take 1 tablet (5 mg total) by mouth once daily.  Qty: 30 tablet, Refills: 11    Associated Diagnoses: Frequency of micturition      tamsulosin (FLOMAX) 0.4 mg Cap Take 1 capsule by mouth once daily.      TRUVADA 200-300 mg Tab TAKE 1 TABLET BY MOUTH EVERY DAY  Qty: 30 tablet, Refills: 5    Comments: 1 in BILLIE      pregabalin (LYRICA) 75 MG capsule Take 1 capsule, 75 mg, once daily for 1 week.  Followed by take 1 capsule 75 mg twice daily for 1 week.  Followed by 2 capsules, 150 mg in the morning and 75 mg at night for 1 week.  Followed by 150 mg twice daily for 1 week.  Followed by 225 mg in the morning and 150 mg in the evening for 1 week.  Followed by 225 mg b.i.d..  Qty: 147 capsule, Refills: 0      terbinafine HCL (LAMISIL) 250 mg tablet TAKE 1 TABLET BY MOUTH ONCE DAILY  Qty: 45 tablet, Refills: 0    Associated Diagnoses: Onychomycosis                 Discharge Diagnosis: Right cervical radiculopathy [M54.12]  Condition on Discharge: Stable with no complications to procedure    Diet on Discharge: Same as before.  Activity: as per instruction sheet.  Discharge to: Home with a responsible adult.  Follow up: 2-4 weeks       Please call the office at [phone] if you experience any weakness or loss of sensation, fever > 101.5, pain uncontrolled with oral medications, persistent nausea/vomiting/or diarrhea, redness or drainage from the incisions, or any other worrisome concerns. If physician on call was not reached or could not communicate with our office for any reason please go to the nearest emergency department

## 2022-10-21 NOTE — OP NOTE
"Bradley Petty Jr.  61 y.o. male      Vitals:    10/21/22 1051   BP: (!) 152/78   Pulse: 80   Resp: 16   Temp:        Procedure Date:10/21/22    INFORMED CONSENT: The procedure, risks, benefits and options were discussed with patient. There are no contraindications to the procedure. The patient expressed understanding and agreed to proceed. The personnel performing the procedure was discussed. I verify that I personally obtained consent prior to the start of the procedure and the signed consent can be found on the patient's chart.         Anesthesia: Topical     Pre Procedure diagnosis: Right cervical radiculopathy [M54.12]     Post-Procedure diagnosis: SAME      Sedation:Conscious sedation provided by M.D    The patient was monitored with continuous pulse oximetry, EKG, and intermittent blood pressure monitors.  The patient was hemodynamically stable throughout the entire process was responsive to voice, and breathing spontaneously.  Supplemental O2 was provided at 2L/min via nasal cannula.  Patient was comfortable for the duration of the procedure. (See nurse documentation and case log for sedation time)    There was a total of 1mg IV Midazolam and 50mcg Fentanyl titrated for the procedure       PROCEDURE:  CERVICAL EPIDURAL STEROID INJECTION         DESCRIPTION OF PROCEDURE: The patient was brought to the procedure room. After performing time out.  IV access was obtained prior to the procedure. The patient was positioned prone on the fluoroscopy table. Continuous hemodynamic monitoring was initiated including blood pressure, EKG, and pulse oximetry. The area of the cervical spine was prepped with chlorhexidine and draped in a sterile fashion. Skin anesthesia was achieved using 3 mL of Lidocaine 1% over the respective injection site. The C5/6 interspace was visualized under fluoroscopic imaging. An 18 gauge 3 1/2" Tuohy needle was slowly inserted from a R paramedian approach and advanced using loss of resistance " to saline with AP, oblique and lateral fluoroscopic imaging for needle guidance. Negative aspiration for blood or CSF was confirmed. Epidural contrast spread was confirmed using 2mL of Omnipaque 300 contrast. Spread of the contrast in the cervical epidural space was noted . 6 mL of bupivacaine 0.25% and 10 mg decadron was injected. The needle was removed and bleeding was nil. A sterile dressing was applied. No specimens collected. patient was taken back to the recovery room for further observation.      Blood Loss: Nill  Specimen: None

## 2022-10-21 NOTE — DISCHARGE INSTRUCTIONS

## 2022-10-28 ENCOUNTER — TELEPHONE (OUTPATIENT)
Dept: INTERNAL MEDICINE | Facility: CLINIC | Age: 62
End: 2022-10-28
Payer: COMMERCIAL

## 2022-10-28 DIAGNOSIS — Z12.11 COLON CANCER SCREENING: Primary | ICD-10-CM

## 2022-10-28 NOTE — TELEPHONE ENCOUNTER
----- Message from Yoly Eaton sent at 10/28/2022  9:07 AM CDT -----  Contact: pt  The pt request a return call concerning a a colonoscopy appt, no additional info given and can be reached at [phone]///[id number]

## 2022-11-01 ENCOUNTER — OFFICE VISIT (OUTPATIENT)
Dept: UROLOGY | Facility: CLINIC | Age: 62
End: 2022-11-01
Payer: COMMERCIAL

## 2022-11-01 VITALS
DIASTOLIC BLOOD PRESSURE: 89 MMHG | SYSTOLIC BLOOD PRESSURE: 151 MMHG | WEIGHT: 270.94 LBS | BODY MASS INDEX: 36.75 KG/M2 | HEART RATE: 86 BPM

## 2022-11-01 DIAGNOSIS — N32.81 OAB (OVERACTIVE BLADDER): Primary | ICD-10-CM

## 2022-11-01 PROCEDURE — 99214 PR OFFICE/OUTPT VISIT, EST, LEVL IV, 30-39 MIN: ICD-10-PCS | Mod: S$GLB,,, | Performed by: UROLOGY

## 2022-11-01 PROCEDURE — 99999 PR PBB SHADOW E&M-EST. PATIENT-LVL III: ICD-10-PCS | Mod: PBBFAC,,, | Performed by: UROLOGY

## 2022-11-01 PROCEDURE — 99214 OFFICE O/P EST MOD 30 MIN: CPT | Mod: S$GLB,,, | Performed by: UROLOGY

## 2022-11-01 PROCEDURE — 99999 PR PBB SHADOW E&M-EST. PATIENT-LVL III: CPT | Mod: PBBFAC,,, | Performed by: UROLOGY

## 2022-11-01 RX ORDER — SOLIFENACIN SUCCINATE 10 MG/1
10 TABLET, FILM COATED ORAL DAILY
Qty: 30 TABLET | Refills: 11 | Status: SHIPPED | OUTPATIENT
Start: 2022-11-01 | End: 2023-11-01

## 2022-11-01 NOTE — PROGRESS NOTES
Chief Complaint:   Slow stream    HPI:   09/07/2022 - patient returns today for follow-up, is still taking the VESIcare, but notes that his urgency has returned, still waking up many times per night, states that he is now filling up the urinal, states that he filled out the voiding diary but forgot to bring it for review, denies any gross hematuria or dysuria, does not restrict fluids prior to bed and will sip on fluids when he wakes up at night, has not yet received his replacement CPAP    07/28/2022 - returns for follow-up, vesicare working but only for the daytime urgency and frequency, nocturia still very present, takes the med in the AM, stream also improved, notes +history of DA, was on a CPAP but [facility]ie had a recall and he has not been using one for sometime, though he does admit that his urinary issues were present prior, no GH or dysuria    06/09/2022 - returns today for follow-up, was started on Flomax by NP Rushing at his last visit, patient states that initially it helped but he has had recurrence of symptoms, has urgency and infrequent urge incontinence, uses a paper towel his pants that he does not soak through his underwear, also notes a slow stream, as well as nocturia times 8-10, denies any gross hematuria, admits that he does not drink enough fluids, may have one cup of coffee in the morning and nothing else during the day until the evening time, he does not restrict fluids prior to bed, denies gross hematuria or dysuria, concerned that he will have to start wearing diapers as he is going on a trip to [place] soon    Patient is a 61-year-old male that is presenting with a slow stream and nocturia.  Patient was seen by primary care provider and has a PSA lab schedule today.  Urine in clinic is negative.  PVR is low, 60 mL.  No BPH meds.   No abd/pelvic pain and no exac/rel factors.  No hematuria.  No urolithiasis.      Allergies:  Patient has no known allergies.    Medications:  has a current  medication list which includes the following prescription(s): acetaminophen, b complex vitamins, cholecalciferol (vitamin d3), cyanocobalamin (vitamin b-12), herbal drugs, multivitamin, pregabalin, solifenacin, tamsulosin, terbinafine hcl, and truvada.    Review of Systems:  General: No fever, chills  Skin: No rashes  Chest:  Denies cough and sputum production  Heart: Denies chest pain  Resp: Denies dyspnea  Abdomen: Denies diarrhea, abdominal pain, hematemesis, or blood in stool.  Musculoskeletal: No joint stiffness or swelling. Denies back pain.  : see HPI  Neuro: no dizziness or weakness      PMH:   has a past medical history of Acid reflux, Arthralgia, Glaucoma, Hypertension, Sacroiliitis (8/29/2022), and Sjogren's syndrome.    PSH:   has a past surgical history that includes Back surgery; Injection of joint (Right, 8/29/2022); and Epidural steroid injection into cervical spine (N/A, 10/21/2022).    FamHx: family history includes Cancer in his maternal grandmother and mother; Diabetes Mellitus in his maternal grandmother; Glaucoma in his maternal grandmother; Rheum arthritis in his maternal grandmother.    SocHx:  reports that he has never smoked. He has never used smokeless tobacco. He reports that he does not drink alcohol and does not use drugs.      Physical Exam:  Vitals:    11/01/22 1410   BP: (!) 151/89   Pulse: 86     General: awake, alert, cooperative  Head: NC/AT  Ears: external ears normal  Eyes: sclera normal  Lungs: normal inspiration, NAD  Heart: well-perfused  Skin: The skin is warm and dry  Ext: No c/c/e.  Neuro: grossly intact, AOx3   4/22: Test desc jemma, no abnormalities of epididymus. Penis  with normal penile and scrotal skin. Meatus normal. Normal rectal tone, no hemorrhoids. Prost 35 gm no nodules or masses appreciated. SV not palpable. Perineum and anus normal.    Labs/Studies:   Lab Results   Component Value Date    WBC 4.16 02/09/2022    HGB 14.3 02/09/2022    HCT 42.9 02/09/2022      02/09/2022     (L) 02/09/2022    K 4.3 02/09/2022     02/09/2022    CREATININE 1.4 02/09/2022    BUN 14 02/09/2022    CO2 27 02/09/2022    TSH 1.536 04/26/2022    PSA 3.2 04/26/2022    HGBA1C 5.5 07/06/2017    CHOL 181 04/26/2022    TRIG 86 04/26/2022    HDL 37 (L) 04/26/2022    ALT 19 04/26/2022    AST 22 04/26/2022     Impression/Plan:   Urgency - continue vesicare, increase to 10mg    Weak stream - likely due to overactive bladder and low bladder volumes verses BPH, continue Flomax for now    Nocturia - limit fluids 2 hours prior to bed, recommend replacing his CPAP    - f/u 4 weeks virtual visit    Abdi Grijalva MD

## 2022-11-02 ENCOUNTER — HOSPITAL ENCOUNTER (OUTPATIENT)
Dept: PREADMISSION TESTING | Facility: HOSPITAL | Age: 62
Discharge: HOME | End: 2022-11-02
Attending: INTERNAL MEDICINE
Payer: COMMERCIAL

## 2022-11-02 ENCOUNTER — TELEPHONE (OUTPATIENT)
Dept: PREADMISSION TESTING | Facility: HOSPITAL | Age: 62
End: 2022-11-02

## 2022-11-02 DIAGNOSIS — Z12.11 COLON CANCER SCREENING: ICD-10-CM

## 2022-11-09 ENCOUNTER — IMMUNIZATION (OUTPATIENT)
Dept: PHARMACY | Facility: CLINIC | Age: 62
End: 2022-11-09
Payer: COMMERCIAL

## 2022-11-09 ENCOUNTER — IMMUNIZATION (OUTPATIENT)
Dept: PHARMACY | Facility: CLINIC | Age: 62
End: 2022-11-09

## 2022-11-09 ENCOUNTER — PATIENT MESSAGE (OUTPATIENT)
Dept: PHARMACY | Facility: CLINIC | Age: 62
End: 2022-11-09
Payer: COMMERCIAL
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
## 2022-11-16 ENCOUNTER — OFFICE VISIT (OUTPATIENT)
Dept: PAIN MEDICINE | Facility: CLINIC | Age: 62
End: 2022-11-16
Payer: COMMERCIAL

## 2022-11-16 VITALS
DIASTOLIC BLOOD PRESSURE: 85 MMHG | RESPIRATION RATE: 17 BRPM | SYSTOLIC BLOOD PRESSURE: 144 MMHG | HEART RATE: 82 BPM | BODY MASS INDEX: 35.23 KG/M2 | HEIGHT: 72 IN | WEIGHT: 260.13 LBS

## 2022-11-16 DIAGNOSIS — M50.30 DDD (DEGENERATIVE DISC DISEASE), CERVICAL: ICD-10-CM

## 2022-11-16 DIAGNOSIS — M54.12 RIGHT CERVICAL RADICULOPATHY: ICD-10-CM

## 2022-11-16 DIAGNOSIS — M54.16 BILATERAL LUMBAR RADICULOPATHY: Primary | ICD-10-CM

## 2022-11-16 DIAGNOSIS — M54.59 DISCOGENIC LUMBAR PAIN: ICD-10-CM

## 2022-11-16 PROCEDURE — 99999 PR PBB SHADOW E&M-EST. PATIENT-LVL IV: ICD-10-PCS | Mod: PBBFAC,,, | Performed by: PHYSICIAN ASSISTANT

## 2022-11-16 PROCEDURE — 99214 OFFICE O/P EST MOD 30 MIN: CPT | Mod: S$GLB,,, | Performed by: PHYSICIAN ASSISTANT

## 2022-11-16 PROCEDURE — 99214 PR OFFICE/OUTPT VISIT, EST, LEVL IV, 30-39 MIN: ICD-10-PCS | Mod: S$GLB,,, | Performed by: PHYSICIAN ASSISTANT

## 2022-11-16 PROCEDURE — 99999 PR PBB SHADOW E&M-EST. PATIENT-LVL IV: CPT | Mod: PBBFAC,,, | Performed by: PHYSICIAN ASSISTANT

## 2022-11-16 RX ORDER — PREGABALIN 75 MG/1
75 CAPSULE ORAL 2 TIMES DAILY
Qty: 60 CAPSULE | Refills: 1 | Status: SHIPPED | OUTPATIENT
Start: 2022-11-16 | End: 2023-04-05

## 2022-11-23 ENCOUNTER — LAB VISIT (OUTPATIENT)
Dept: LAB | Facility: HOSPITAL | Age: 62
End: 2022-11-23
Attending: INTERNAL MEDICINE
Payer: COMMERCIAL

## 2022-11-23 DIAGNOSIS — Z79.899 ON PRE-EXPOSURE PROPHYLAXIS FOR HIV: ICD-10-CM

## 2022-11-23 PROCEDURE — 36415 COLL VENOUS BLD VENIPUNCTURE: CPT | Performed by: INTERNAL MEDICINE

## 2022-11-23 PROCEDURE — 80048 BASIC METABOLIC PNL TOTAL CA: CPT | Performed by: INTERNAL MEDICINE

## 2022-11-23 PROCEDURE — 85025 COMPLETE CBC W/AUTO DIFF WBC: CPT | Performed by: INTERNAL MEDICINE

## 2022-11-23 PROCEDURE — 87389 HIV-1 AG W/HIV-1&-2 AB AG IA: CPT | Performed by: INTERNAL MEDICINE

## 2022-11-24 LAB
ANION GAP SERPL CALC-SCNC: 10 MMOL/L (ref 8–16)
BASOPHILS # BLD AUTO: 0.02 K/UL (ref 0–0.2)
BASOPHILS NFR BLD: 0.5 % (ref 0–1.9)
BUN SERPL-MCNC: 12 MG/DL (ref 6–20)
CALCIUM SERPL-MCNC: 9.8 MG/DL (ref 8.7–10.5)
CHLORIDE SERPL-SCNC: 102 MMOL/L (ref 95–110)
CO2 SERPL-SCNC: 25 MMOL/L (ref 23–29)
CREAT SERPL-MCNC: 1.6 MG/DL (ref 0.5–1.4)
DIFFERENTIAL METHOD: ABNORMAL
EOSINOPHIL # BLD AUTO: 0.1 K/UL (ref 0–0.5)
EOSINOPHIL NFR BLD: 1.6 % (ref 0–8)
ERYTHROCYTE [DISTWIDTH] IN BLOOD BY AUTOMATED COUNT: 12.4 % (ref 11.5–14.5)
EST. GFR  (NO RACE VARIABLE): 49 ML/MIN/1.73 M^2
GLUCOSE SERPL-MCNC: 86 MG/DL (ref 70–110)
HCT VFR BLD AUTO: 40.4 % (ref 40–54)
HGB BLD-MCNC: 13 G/DL (ref 14–18)
HIV 1+2 AB+HIV1 P24 AG SERPL QL IA: NORMAL
IMM GRANULOCYTES # BLD AUTO: 0.01 K/UL (ref 0–0.04)
IMM GRANULOCYTES NFR BLD AUTO: 0.2 % (ref 0–0.5)
LYMPHOCYTES # BLD AUTO: 2.2 K/UL (ref 1–4.8)
LYMPHOCYTES NFR BLD: 48.8 % (ref 18–48)
MCH RBC QN AUTO: 31.8 PG (ref 27–31)
MCHC RBC AUTO-ENTMCNC: 32.2 G/DL (ref 32–36)
MCV RBC AUTO: 99 FL (ref 82–98)
MONOCYTES # BLD AUTO: 0.3 K/UL (ref 0.3–1)
MONOCYTES NFR BLD: 6.8 % (ref 4–15)
NEUTROPHILS # BLD AUTO: 1.9 K/UL (ref 1.8–7.7)
NEUTROPHILS NFR BLD: 42.1 % (ref 38–73)
NRBC BLD-RTO: 0 /100 WBC
PLATELET # BLD AUTO: 245 K/UL (ref 150–450)
PMV BLD AUTO: 9.9 FL (ref 9.2–12.9)
POTASSIUM SERPL-SCNC: 4.2 MMOL/L (ref 3.5–5.1)
RBC # BLD AUTO: 4.09 M/UL (ref 4.6–6.2)
SODIUM SERPL-SCNC: 137 MMOL/L (ref 136–145)
WBC # BLD AUTO: 4.43 K/UL (ref 3.9–12.7)

## 2022-11-27 ENCOUNTER — PATIENT MESSAGE (OUTPATIENT)
Dept: PAIN MEDICINE | Facility: CLINIC | Age: 62
End: 2022-11-27
Payer: COMMERCIAL

## 2022-11-29 ENCOUNTER — TELEPHONE (OUTPATIENT)
Dept: PULMONOLOGY | Facility: CLINIC | Age: 62
End: 2022-11-29
Payer: COMMERCIAL

## 2022-11-29 ENCOUNTER — PATIENT MESSAGE (OUTPATIENT)
Dept: INFECTIOUS DISEASES | Facility: CLINIC | Age: 62
End: 2022-11-29
Payer: COMMERCIAL

## 2022-11-29 ENCOUNTER — OFFICE VISIT (OUTPATIENT)
Dept: UROLOGY | Facility: CLINIC | Age: 62
End: 2022-11-29
Payer: COMMERCIAL

## 2022-11-29 ENCOUNTER — PATIENT MESSAGE (OUTPATIENT)
Dept: UROLOGY | Facility: CLINIC | Age: 62
End: 2022-11-29

## 2022-11-29 ENCOUNTER — TELEPHONE (OUTPATIENT)
Dept: UROLOGY | Facility: CLINIC | Age: 62
End: 2022-11-29

## 2022-11-29 DIAGNOSIS — N32.81 OAB (OVERACTIVE BLADDER): Primary | ICD-10-CM

## 2022-11-29 PROCEDURE — 99214 PR OFFICE/OUTPT VISIT, EST, LEVL IV, 30-39 MIN: ICD-10-PCS | Mod: 95,,, | Performed by: UROLOGY

## 2022-11-29 PROCEDURE — 99214 OFFICE O/P EST MOD 30 MIN: CPT | Mod: 95,,, | Performed by: UROLOGY

## 2022-11-29 NOTE — TELEPHONE ENCOUNTER
S/w pt.  He is having concerns about a lab result.  Tried to explain it to him.  He wants to talk to Dr. Gan.  I sent a message to Dr. Mast to call him.

## 2022-11-29 NOTE — PROGRESS NOTES
The patient location is: [place]  The chief complaint leading to consultation is: OAB    Visit type: audiovisual    Face to Face time with patient: 7mins  15 minutes of total time spent on the encounter, which includes face to face time and non-face to face time preparing to see the patient (eg, review of tests), Obtaining and/or reviewing separately obtained history, Documenting clinical information in the electronic or other health record, Independently interpreting results (not separately reported) and communicating results to the patient/family/caregiver, or Care coordination (not separately reported).     Each patient to whom he or she provides medical services by telemedicine is:  (1) informed of the relationship between the physician and patient and the respective role of any other health care provider with respect to management of the patient; and (2) notified that he or she may decline to receive medical services by telemedicine and may withdraw from such care at any time.      Chief Complaint: OAB      HPI:   11/29/2022 - returns for follow-up, increased dose of vesicare has improved his OAB but patient notes that it's 'not there yet', no SEs, nocturia stable    09/07/2022 - patient returns today for follow-up, is still taking the VESIcare, but notes that his urgency has returned, still waking up many times per night, states that he is now filling up the urinal, states that he filled out the voiding diary but forgot to bring it for review, denies any gross hematuria or dysuria, does not restrict fluids prior to bed and will sip on fluids when he wakes up at night, has not yet received his replacement CPAP    07/28/2022 - returns for follow-up, vesicare working but only for the daytime urgency and frequency, nocturia still very present, takes the med in the AM, stream also improved, notes +history of DA, was on a CPAP but Lacie had a recall and he has not been using one for sometime, though he does admit  that his urinary issues were present prior, no GH or dysuria    06/09/2022 - returns today for follow-up, was started on Flomax by LALI Zapien at his last visit, patient states that initially it helped but he has had recurrence of symptoms, has urgency and infrequent urge incontinence, uses a paper towel his pants that he does not soak through his underwear, also notes a slow stream, as well as nocturia times 8-10, denies any gross hematuria, admits that he does not drink enough fluids, may have one cup of coffee in the morning and nothing else during the day until the evening time, he does not restrict fluids prior to bed, denies gross hematuria or dysuria, concerned that he will have to start wearing diapers as he is going on a trip to [place] soon    Patient is a 61-year-old male that is presenting with a slow stream and nocturia.  Patient was seen by primary care provider and has a PSA lab schedule today.  Urine in clinic is negative.  PVR is low, 60 mL.  No BPH meds.   No abd/pelvic pain and no exac/rel factors.  No hematuria.  No urolithiasis.      Allergies:  Patient has no known allergies.    Medications:  has a current medication list which includes the following prescription(s): acetaminophen, b complex vitamins, cholecalciferol (vitamin d3), cyanocobalamin (vitamin b-12), herbal drugs, multivitamin, pregabalin, solifenacin, tamsulosin, terbinafine hcl, and truvada.    Review of Systems:  General: No fever, chills  Skin: No rashes  Chest:  Denies cough and sputum production  Heart: Denies chest pain  Resp: Denies dyspnea  Abdomen: Denies diarrhea, abdominal pain, hematemesis, or blood in stool.  Musculoskeletal: No joint stiffness or swelling. Denies back pain.  : see HPI  Neuro: no dizziness or weakness      PMH:   has a past medical history of Acid reflux, Arthralgia, Glaucoma, Hypertension, Sacroiliitis (8/29/2022), and Sjogren's syndrome.    PSH:   has a past surgical history that includes Back  surgery; Injection of joint (Right, 8/29/2022); and Epidural steroid injection into cervical spine (N/A, 10/21/2022).    FamHx: family history includes Cancer in his maternal grandmother and mother; Diabetes Mellitus in his maternal grandmother; Glaucoma in his maternal grandmother; Rheum arthritis in his maternal grandmother.    SocHx:  reports that he has never smoked. He has never used smokeless tobacco. He reports that he does not drink alcohol and does not use drugs.      Physical Exam:  There were no vitals filed for this visit.    General: awake, alert, cooperative  Head: NC/AT  Ears: external ears normal  Eyes: sclera normal  Lungs: normal inspiration, NAD  Heart: well-perfused  Ext: No c/c/e.  Neuro: grossly intact, AOx3   4/22: Test desc jemma, no abnormalities of epididymus. Penis  with normal penile and scrotal skin. Meatus normal. Normal rectal tone, no hemorrhoids. Prost 35 gm no nodules or masses appreciated. SV not palpable. Perineum and anus normal.    Labs/Studies:   Lab Results   Component Value Date    WBC 4.43 11/23/2022    HGB 13.0 (L) 11/23/2022    HCT 40.4 11/23/2022     11/23/2022     11/23/2022    K 4.2 11/23/2022     11/23/2022    CREATININE 1.6 (H) 11/23/2022    BUN 12 11/23/2022    CO2 25 11/23/2022    TSH 1.536 04/26/2022    PSA 3.2 04/26/2022    HGBA1C 5.5 07/06/2017    CHOL 181 04/26/2022    TRIG 86 04/26/2022    HDL 37 (L) 04/26/2022    ALT 19 04/26/2022    AST 22 04/26/2022     Impression/Plan:   Urgency - continue vesicare 10mg, add myrbetriq    Weak stream - likely due to overactive bladder and low bladder volumes verses BPH, continue Flomax for now    Nocturia - limit fluids 2 hours prior to bed, recommend replacing his CPAP    - f/u 4 weeks virtual visit    Abdi Grijalva MD

## 2022-11-29 NOTE — TELEPHONE ENCOUNTER
----- Message from Neal Jacobs sent at 11/29/2022  8:02 AM CST -----  Contact: Pt [phone]  Pt called in regards to test results he received he is confused about them he would like speak with Dr Mast please call and advise

## 2022-11-29 NOTE — TELEPHONE ENCOUNTER
Follow up appointment already scheduled and pt was advised through [facility].      ----- Message from Abdi Grijalva MD sent at 11/29/2022  4:24 PM CST -----  Adding Myrbetriq, f/ryan 4 weeks for virtual, thanks

## 2022-12-06 ENCOUNTER — OFFICE VISIT (OUTPATIENT)
Dept: INTERNAL MEDICINE | Facility: CLINIC | Age: 62
End: 2022-12-06
Payer: COMMERCIAL

## 2022-12-06 DIAGNOSIS — B35.1 ONYCHOMYCOSIS: ICD-10-CM

## 2022-12-06 DIAGNOSIS — F32.A DEPRESSION, UNSPECIFIED DEPRESSION TYPE: Primary | ICD-10-CM

## 2022-12-06 DIAGNOSIS — M54.12 CERVICAL RADICULITIS: ICD-10-CM

## 2022-12-06 PROCEDURE — 99214 PR OFFICE/OUTPT VISIT, EST, LEVL IV, 30-39 MIN: ICD-10-PCS | Mod: 95,,, | Performed by: INTERNAL MEDICINE

## 2022-12-06 PROCEDURE — 99214 OFFICE O/P EST MOD 30 MIN: CPT | Mod: 95,,, | Performed by: INTERNAL MEDICINE

## 2022-12-06 NOTE — PROGRESS NOTES
Subjective:      Patient ID: Bradley Petty Jr. is a 62 y.o. male.    Chief Complaint: Results      HPI    The patient location is: [place]  The chief complaint leading to consultation is: mult med problems as outlined below    Visit type: audiovisual    Face to Face time with patient: 20 min   minutes of total time spent on the encounter, which includes face to face time and non-face to face time preparing to see the patient (eg, review of tests), Obtaining and/or reviewing separately obtained history, Documenting clinical information in the electronic or other health record, Independently interpreting results (not separately reported) and communicating results to the patient/family/caregiver, or Care coordination (not separately reported).         Each patient to whom he or she provides medical services by telemedicine is:  (1) informed of the relationship between the physician and patient and the respective role of any other health care provider with respect to management of the patient; and (2) notified that he or she may decline to receive medical services by telemedicine and may withdraw from such care at any time.    Notes:     63 yo with   Patient Active Problem List   Diagnosis    Leukocytopenia, unspecified    Lumbago    Unspecified polyarthropathy or polyarthritis, site unspecified    Fibromyalgia    Depressed    Pain in left shoulder    Cervical radiculitis    Primary open-angle glaucoma(365.11)    Family history of glaucoma    Refractive error    DA on CPAP    Obesity (BMI 30-39.9)    Postinflammatory hyperpigmentation    Hypersomnolence    On pre-exposure prophylaxis for HIV    Decreased ROM of lumbar spine    Weakness of both lower extremities    Sacroiliitis     Past Medical History:   Diagnosis Date    Acid reflux     Arthralgia     Glaucoma     Hypertension     Sacroiliitis 8/29/2022    Sjogren's syndrome      Here today for management of mult med problems as outlined below.  Compliant with  meds without significant side effects. Energy and appetite good.     Compliant with spine clinic. Injections helping some.       Review of Systems   Constitutional:  Negative for activity change and unexpected weight change.   HENT:  Negative for hearing loss, rhinorrhea and trouble swallowing.    Eyes:  Negative for discharge and visual disturbance.   Respiratory:  Negative for chest tightness and wheezing.    Cardiovascular:  Negative for chest pain and palpitations.   Gastrointestinal:  Negative for blood in stool, constipation, diarrhea and vomiting.   Endocrine: Negative for polydipsia and polyuria.   Genitourinary:  Negative for difficulty urinating, hematuria and urgency.   Musculoskeletal:  Positive for neck pain. Negative for arthralgias and joint swelling.   Neurological:  Negative for weakness and headaches.   Psychiatric/Behavioral:  Negative for confusion and dysphoric mood.    Objective:   There were no vitals taken for this visit.    Physical Exam  Constitutional:       General: He is awake.      Appearance: Normal appearance.   HENT:      Head: Normocephalic and atraumatic.   Eyes:      Conjunctiva/sclera: Conjunctivae normal.   Pulmonary:      Effort: Pulmonary effort is normal.   Musculoskeletal:      Cervical back: Normal range of motion.   Neurological:      Mental Status: He is alert. Mental status is at baseline.   Psychiatric:         Mood and Affect: Mood normal.         Behavior: Behavior normal. Behavior is cooperative.         Thought Content: Thought content normal.         Judgment: Judgment normal.       Lab Results   Component Value Date    WBC 4.43 11/23/2022    HGB 13.0 (L) 11/23/2022    HGB 14.3 02/09/2022    HGB 14.4 02/19/2020    HCT 40.4 11/23/2022    MCV 99 (H) 11/23/2022    MCV 96 02/09/2022    MCV 93.1 02/19/2020     11/23/2022    CHOL 181 04/26/2022    TRIG 86 04/26/2022    HDL 37 (L) 04/26/2022    LDLCALC 126.8 04/26/2022    LDLCALC 146.8 07/06/2017    LDLCALC 48.6  (L) 02/22/2016    ALT 19 04/26/2022    AST 22 04/26/2022     11/23/2022    K 4.2 11/23/2022     11/23/2022    CO2 25 11/23/2022    BUN 12 11/23/2022    CREATININE 1.6 (H) 11/23/2022    CREATININE 1.4 02/09/2022    CREATININE 1.39 (H) 02/19/2020    EGFRNORACEVR 49.0 (A) 11/23/2022    TSH 1.536 04/26/2022    TSH 2.519 07/06/2017    TSH 2.297 02/21/2017    PSA 3.2 04/26/2022    PSA 2.1 07/06/2017    PSA 1.6 10/22/2014    GLU 86 11/23/2022    HGBA1C 5.5 07/06/2017    HGBA1C 5.7 08/26/2015    [id number] 59 07/06/2017    TOTALTESTOST 593 08/08/2014          The 10-year ASCVD risk score (Marshall FONTANA, et al., 2019) is: 11.1%    Values used to calculate the score:      Age: 62 years      Sex: Male      Is Non- : Yes      Diabetic: No      Tobacco smoker: No      Systolic Blood Pressure: 144 mmHg      Is BP treated: No      HDL Cholesterol: 37 mg/dL      Total Cholesterol: 181 mg/dL     Assessment:     1. Depression, unspecified depression type    2. Cervical radiculitis    3. Onychomycosis      Plan:   1. Depression, unspecified depression type  stable  2. Cervical radiculitis  As above  3. Onychomycosis  Longstanding  Reports never started lamisil but would like to start now.   -     terbinafine HCL (LAMISIL) 250 mg tablet; Take 1 tablet (250 mg total) by mouth once daily.  Dispense: 45 tablet; Refill: 0  -     Cancel: Hepatic Function Panel; Future; Expected date: 01/14/2023  -     Hepatic Function Panel; Future; Expected date: 01/14/2023  -     Hepatic Function Panel; Future; Expected date: 12/14/2022        There are no Patient Instructions on file for this visit.    Future Appointments   Date Time Provider Department Center   12/27/2022  4:15 PM Abdi Grijalva MD [facility] UROLOGY [facility]   4/6/2023 11:20 AM [facility]       Lab Frequency Next Occurrence   Ambulatory referral/consult to Pain Clinic Once 05/23/2022   IR Aspiration Injection Large Joint  W FL Once 06/28/2022   IR Aspiration Injection Large Joint W FL Once 06/28/2022   IR ALLIE Cervical/THoracic w/ Img Once 09/28/2022   IR Epidural Transfor 1st Vert Lumbar Michael Once 11/16/2022   Basic Metabolic Panel Every 16 weeks 1/6/2023, 4/28/2023, 8/18/2023   CBC Auto Differential Every 16 weeks 1/6/2023   HIV 1/2 Ag/Ab (4th Gen) Every 16 weeks 3/17/2023, 7/7/2023       Follow up in about 4 months (around 4/6/2023), or if symptoms worsen or fail to improve.  Need colonoscopy report from Dr. Bo

## 2022-12-07 ENCOUNTER — OFFICE VISIT (OUTPATIENT)
Dept: INFECTIOUS DISEASES | Facility: CLINIC | Age: 62
End: 2022-12-07
Payer: COMMERCIAL

## 2022-12-07 DIAGNOSIS — G47.33 OSA ON CPAP: ICD-10-CM

## 2022-12-07 DIAGNOSIS — F32.A DEPRESSION, UNSPECIFIED DEPRESSION TYPE: ICD-10-CM

## 2022-12-07 DIAGNOSIS — H40.1190 PRIMARY OPEN ANGLE GLAUCOMA, UNSPECIFIED GLAUCOMA STAGE, UNSPECIFIED LATERALITY: ICD-10-CM

## 2022-12-07 DIAGNOSIS — Z79.899 ON PRE-EXPOSURE PROPHYLAXIS FOR HIV: ICD-10-CM

## 2022-12-07 PROCEDURE — 99214 OFFICE O/P EST MOD 30 MIN: CPT | Mod: 95,,, | Performed by: INTERNAL MEDICINE

## 2022-12-07 PROCEDURE — 99214 PR OFFICE/OUTPT VISIT, EST, LEVL IV, 30-39 MIN: ICD-10-PCS | Mod: 95,,, | Performed by: INTERNAL MEDICINE

## 2022-12-07 NOTE — ASSESSMENT & PLAN NOTE
Will continue Truvada , due to the renal function that fluctuates from serum creatinine -  Component      Latest Ref Rng & Units 11/23/2022 2/9/2022 2/19/2020 5/13/2019   Creatinine      0.5 - 1.4 mg/dL 1.6 (H) 1.4 1.39 (H) 1.5 (H)   We discussed use of cabotegravir every 2 months. He wants to think about it

## 2022-12-07 NOTE — PROGRESS NOTES
"Subjective:    This is a virtual visit .  Location -[place]   Patient ID: Bradley Petty Jr. is a 62 y.o. male.    Chief Complaint: follow up for PREP  HPI  62 year old man on Truvda for pREP.  Latest labs-  11/23/22- HIV negative  Component      Latest Ref Rng & Units 11/23/2022 2/9/2022 2/19/2020 5/13/2019   Creatinine      0.5 - 1.4 mg/dL 1.6 (H) 1.4 1.39 (H) 1.5 (H)   He has some questions about the results as it said "non reactive" and previously was negative.  Review of Systems   Constitutional:  Negative for appetite change and chills.   Respiratory:  Negative for apnea and chest tightness.    Cardiovascular:  Negative for chest pain.   Psychiatric/Behavioral:  Negative for agitation and behavioral problems.        Objective:      Physical Exam  Vitals and nursing note reviewed.   HENT:      Head: Normocephalic.      Nose: Nose normal.   Musculoskeletal:         General: Normal range of motion.   Skin:     General: Skin is warm.   Neurological:      Mental Status: He is alert and oriented to person, place, and time.       Assessment:       1. On pre-exposure prophylaxis for HIV        2. Primary open angle glaucoma, unspecified glaucoma stage, unspecified laterality        3. DA on CPAP        4. Depression, unspecified depression type              Plan:       Problem List Items Addressed This Visit       Depressed     Follow PCP         Primary open-angle glaucoma(365.11)     Follow opthalmology           DA on CPAP     CPAP as tolerated          On pre-exposure prophylaxis for HIV     Will continue Truvada , due to the renal function that fluctuates from serum creatinine -  Component      Latest Ref Rng & Units 11/23/2022 2/9/2022 2/19/2020 5/13/2019   Creatinine      0.5 - 1.4 mg/dL 1.6 (H) 1.4 1.39 (H) 1.5 (H)   We discussed use of cabotegravir every 2 months. He wants to think about it                 "

## 2022-12-11 ENCOUNTER — PATIENT MESSAGE (OUTPATIENT)
Dept: INTERNAL MEDICINE | Facility: CLINIC | Age: 62
End: 2022-12-11
Payer: COMMERCIAL

## 2022-12-14 RX ORDER — TERBINAFINE HYDROCHLORIDE 250 MG/1
250 TABLET ORAL DAILY
Qty: 45 TABLET | Refills: 0 | Status: SHIPPED | OUTPATIENT
Start: 2022-12-14 | End: 2023-04-21

## 2022-12-22 NOTE — PRE-PROCEDURE INSTRUCTIONS
Spoke with patient regarding procedure scheduled on 12.30     Arrival time 0715     Has patient been sick with fever or on antibiotics within the last 7 days? No     Does the patient have any open wounds, sores or rashes? No     Does the patient have any recent fractures? no     Has patient received a vaccination within the last 7 days? No     Received the COVID vaccination?      Has the patient stopped all medications as directed? na     Does patient have a pacemaker and or defibrillator? no     Does the patient have a ride to and from procedure and someone reliable to remain with patient? mom      Is the patient diabetic? no     Does the patient have sleep apnea? Or use O2 at home? Devan cpap      Is the patient receiving sedation? unsure     Is the patient instructed to remain NPO beginning at midnight the night before their procedure? yes     Procedure location confirmed with patient? Yes     Covid- Denies signs/symptoms. Instructed to notify PAT/MD if any changes.

## 2022-12-28 ENCOUNTER — TELEPHONE (OUTPATIENT)
Dept: UROLOGY | Facility: CLINIC | Age: 62
End: 2022-12-28
Payer: COMMERCIAL

## 2022-12-30 ENCOUNTER — HOSPITAL ENCOUNTER (OUTPATIENT)
Facility: HOSPITAL | Age: 62
Discharge: HOME | End: 2022-12-30
Attending: ANESTHESIOLOGY | Admitting: ANESTHESIOLOGY
Payer: COMMERCIAL

## 2022-12-30 VITALS
HEIGHT: 72 IN | TEMPERATURE: 98 F | SYSTOLIC BLOOD PRESSURE: 127 MMHG | BODY MASS INDEX: 34.94 KG/M2 | RESPIRATION RATE: 14 BRPM | OXYGEN SATURATION: 96 % | DIASTOLIC BLOOD PRESSURE: 74 MMHG | HEART RATE: 77 BPM | WEIGHT: 257.94 LBS

## 2022-12-30 DIAGNOSIS — M54.16 LUMBAR RADICULOPATHY: ICD-10-CM

## 2022-12-30 PROCEDURE — 64483 PR EPIDURAL INJ, ANES/STEROID, TRANSFORAMINAL, LUMB/SACR, SNGL LEVL: ICD-10-PCS | Mod: 50,,, | Performed by: ANESTHESIOLOGY

## 2022-12-30 PROCEDURE — 63600175 PHARM REV CODE 636 W HCPCS: Performed by: ANESTHESIOLOGY

## 2022-12-30 PROCEDURE — 25000003 PHARM REV CODE 250: Performed by: ANESTHESIOLOGY

## 2022-12-30 PROCEDURE — 25500020 PHARM REV CODE 255: Performed by: ANESTHESIOLOGY

## 2022-12-30 PROCEDURE — 64483 NJX AA&/STRD TFRM EPI L/S 1: CPT | Mod: 50,,, | Performed by: ANESTHESIOLOGY

## 2022-12-30 PROCEDURE — 64483 NJX AA&/STRD TFRM EPI L/S 1: CPT | Mod: 50 | Performed by: ANESTHESIOLOGY

## 2022-12-30 RX ORDER — FENTANYL CITRATE 50 UG/ML
INJECTION, SOLUTION INTRAMUSCULAR; INTRAVENOUS
Status: DISCONTINUED | OUTPATIENT
Start: 2022-12-30 | End: 2022-12-30 | Stop reason: HOSPADM

## 2022-12-30 RX ORDER — MIDAZOLAM HYDROCHLORIDE 1 MG/ML
INJECTION, SOLUTION INTRAMUSCULAR; INTRAVENOUS
Status: DISCONTINUED | OUTPATIENT
Start: 2022-12-30 | End: 2022-12-30 | Stop reason: HOSPADM

## 2022-12-30 RX ORDER — DEXAMETHASONE SODIUM PHOSPHATE 10 MG/ML
INJECTION INTRAMUSCULAR; INTRAVENOUS
Status: DISCONTINUED | OUTPATIENT
Start: 2022-12-30 | End: 2022-12-30 | Stop reason: HOSPADM

## 2022-12-30 RX ORDER — BUPIVACAINE HYDROCHLORIDE 2.5 MG/ML
INJECTION, SOLUTION EPIDURAL; INFILTRATION; INTRACAUDAL
Status: DISCONTINUED | OUTPATIENT
Start: 2022-12-30 | End: 2022-12-30 | Stop reason: HOSPADM

## 2022-12-30 RX ORDER — SODIUM BICARBONATE 1 MEQ/ML
SYRINGE (ML) INTRAVENOUS
Status: DISCONTINUED | OUTPATIENT
Start: 2022-12-30 | End: 2022-12-30 | Stop reason: HOSPADM

## 2022-12-30 NOTE — DISCHARGE SUMMARY
Discharge Note  Short Stay      SUMMARY     Admit Date: 12/30/2022    Attending Physician: Tiffany Diaz MD        Discharge Physician: Tiffany Diaz MD        Discharge Date: 12/30/2022 8:57 AM    Procedure(s) (LRB):  Bilateral L5/S1 TF ALLIE w/RN IV Sedation (Bilateral)    Final Diagnosis: Bilateral lumbar radiculopathy [M54.16]    Disposition: Home or self care    Patient Instructions:   Current Discharge Medication List        CONTINUE these medications which have NOT CHANGED    Details   pregabalin (LYRICA) 75 MG capsule Take 1 capsule (75 mg total) by mouth 2 (two) times daily.  Qty: 60 capsule, Refills: 1      acetaminophen (TYLENOL) 500 mg Cap Take 1,000 mg by mouth once daily.      b complex vitamins tablet Take 1 tablet by mouth Daily.      cholecalciferol, vitamin D3, 5,000 unit capsule Take 5,000 Units by mouth Daily.      cyanocobalamin, vitamin B-12, 5,000 mcg TbDL Take 1 tablet by mouth once daily.      emtricitabine-tenofovir 200-300 mg (TRUVADA) 200-300 mg Tab TAKE 1 TABLET BY MOUTH EVERY DAY  Qty: 90 tablet, Refills: 1      HERBAL DRUGS (COLON HERBAL CLEANSER ORAL) Take by mouth once daily.       multivitamin capsule Take 1 capsule by mouth Daily.      solifenacin (VESICARE) 10 MG tablet Take 1 tablet (10 mg total) by mouth once daily.  Qty: 30 tablet, Refills: 11    Associated Diagnoses: OAB (overactive bladder)      tamsulosin (FLOMAX) 0.4 mg Cap Take 1 capsule by mouth once daily.      terbinafine HCL (LAMISIL) 250 mg tablet Take 1 tablet (250 mg total) by mouth once daily.  Qty: 45 tablet, Refills: 0    Associated Diagnoses: Onychomycosis                 Discharge Diagnosis: Bilateral lumbar radiculopathy [M54.16]  Condition on Discharge: Stable with no complications to procedure   Diet on Discharge: Same as before.  Activity: as per instruction sheet.  Discharge to: Home with a responsible adult.  Follow up: 2-4 weeks       Please call the office at [phone] if you experience any weakness  or loss of sensation, fever > 101.5, pain uncontrolled with oral medications, persistent nausea/vomiting/or diarrhea, redness or drainage from the incisions, or any other worrisome concerns. If physician on call was not reached or could not communicate with our office for any reason please go to the nearest emergency department

## 2022-12-30 NOTE — H&P
HPI  Patient presenting for Procedure(s) (LRB):  Bilateral L5/S1 TF ALLIE w/RN IV Sedation (Bilateral)     Patient on Anti-coagulation No    No health changes since previous encounter    Past Medical History:   Diagnosis Date    Acid reflux     Arthralgia     Glaucoma     Hypertension     Sacroiliitis 8/29/2022    Sjogren's syndrome      Past Surgical History:   Procedure Laterality Date    BACK SURGERY      EPIDURAL STEROID INJECTION INTO CERVICAL SPINE N/A 10/21/2022    Procedure: C5/6 IL ALLIE w/RN IV Sedation;  Surgeon: Tiffany Diaz MD;  Location: [facility];  Service: Pain Management;  Laterality: N/A;    INJECTION OF JOINT Right 8/29/2022    Procedure: Right sacroiliac joint + GT bursa injection with RN IV sedation;  Surgeon: Tiffany Diaz MD;  Location: [facility];  Service: Pain Management;  Laterality: Right;     Review of patient's allergies indicates:  No Known Allergies     No current facility-administered medications on file prior to encounter.     Current Outpatient Medications on File Prior to Encounter   Medication Sig Dispense Refill    acetaminophen (TYLENOL) 500 mg Cap Take 1,000 mg by mouth once daily.      b complex vitamins tablet Take 1 tablet by mouth Daily.      cholecalciferol, vitamin D3, 5,000 unit capsule Take 5,000 Units by mouth Daily.      cyanocobalamin, vitamin B-12, 5,000 mcg TbDL Take 1 tablet by mouth once daily.      HERBAL DRUGS (COLON HERBAL CLEANSER ORAL) Take by mouth once daily.       multivitamin capsule Take 1 capsule by mouth Daily.      solifenacin (VESICARE) 10 MG tablet Take 1 tablet (10 mg total) by mouth once daily. 30 tablet 11    tamsulosin (FLOMAX) 0.4 mg Cap Take 1 capsule by mouth once daily.          PMHx, PSHx, Allergies, Medications reviewed in epic    ROS negative except pain complaints in HPI    OBJECTIVE:    BP (!) 153/87 (BP Location: Right arm, Patient Position: Sitting)   Pulse 80   Temp 97.8 °F (36.6 °C) (Temporal)   Resp 17   Ht 6' (1.829 m)    Wt 117 kg (257 lb 15 oz)   SpO2 99%   BMI 34.98 kg/m²     PHYSICAL EXAMINATION:    GENERAL: Well appearing, in no acute distress, alert and oriented x3.  PSYCH:  Mood and affect appropriate.  SKIN: Skin color, texture, turgor normal, no rashes or lesions which will impact the procedure.  CV: RRR with palpation of the radial artery.  PULM: No evidence of respiratory difficulty, symmetric chest rise. Clear to auscultation.  NEURO: Cranial nerves grossly intact.    Plan:    Proceed with procedure as planned Procedure(s) (LRB):  Bilateral L5/S1 TF ALLIE w/RN IV Sedation (Bilateral)    Tiffany Diaz MD  12/30/2022

## 2022-12-30 NOTE — OP NOTE
Bradley Petty .  62 y.o. male      Vitals:    12/30/22 0854   BP: (!) 147/92   Pulse: 74   Resp: 15   Temp:      Procedure Date: 12/30/22      INFORMED CONSENT: The procedure, risks, benefits and options were discussed with patient. There are no contraindications to the procedure. The patient expressed understanding and agreed to proceed. The personnel performing the procedure was discussed. I verify that I personally obtained consent prior to the start of the procedure and the signed consent can be found on the patient's chart.       Anesthesia:   Conscious sedation provided by M.D    The patient was monitored with continuous pulse oximetry, EKG, and intermittent blood pressure monitors.  The patient was hemodynamically stable throughout the entire process was responsive to voice, and breathing spontaneously.  Supplemental O2 was provided at 2L/min via nasal cannula.  Patient was comfortable for the duration of the procedure. (See nurse documentation and case log for sedation time)    There was a total of 2mg IV Midazolam and 100mcg Fentanyl titrated for the procedure    Pre Procedure diagnosis: Bilateral lumbar radiculopathy [M54.16]  Post-Procedure diagnosis: SAME     Complications: None    Specimens: None      DESCRIPTION OF PROCEDURE: The patient was brought to the procedure room. IV access was obtained prior to the procedure. The patient was positioned prone on the fluoroscopy table. Continuous hemodynamic monitoring was initiated including blood pressure, EKG, and pulse oximetry. . The skin was prepped with chlorhexidine and draped in a sterile fashion. Skin anesthesia was achieved using a total of 10mL of lidocaine, 5mL over each respective injection site.     The  L5/S1 transforaminal spaces were identified with fluoroscopy in the  AP, oblique, and lateral views.  A 22 gauge spinal quinke needle was then advanced into the area of the trans foraminal spaces bilateral with confirmation of proper needle  position using AP, oblique, and lateral fluoroscopic views. Once the needle tip was in the area of the transforaminal space, and there was no blood, CSF or paraesthesias,  1.5 mL of Omnipaque 300mg/ml was injected on bilateral for a total of 3mL.  Fluoroscopic imaging in the AP and lateral views revealed a clear outline of the spinal nerve with proximal spread of agent through the neural foramen into the epidural space. A total combination of 2 mL of Bupivicaine 0.25% and 10 mg dexamethasone was injected on each side for a total of 6 mL of injected medications with displacement of the contrast dye confirming that the medication went into the area of the transforaminal spaces bilateral. A sterile dressing was applied.   Patient tolerated the procedure well.    Patient was taken back to the recovery room for further observation.     The patient was discharged to home in stable condition

## 2022-12-30 NOTE — DISCHARGE INSTRUCTIONS

## 2022-12-30 NOTE — PLAN OF CARE
Pt in recovery, tolerating PO fluids. NSR on monitor. FAMILY at bedside. No complaints of pain at this time.

## 2023-01-03 ENCOUNTER — PATIENT MESSAGE (OUTPATIENT)
Dept: PAIN MEDICINE | Facility: CLINIC | Age: 63
End: 2023-01-03
Payer: COMMERCIAL

## 2023-01-09 ENCOUNTER — PATIENT MESSAGE (OUTPATIENT)
Dept: INTERNAL MEDICINE | Facility: CLINIC | Age: 63
End: 2023-01-09
Payer: COMMERCIAL

## 2023-01-23 ENCOUNTER — OFFICE VISIT (OUTPATIENT)
Dept: OTOLARYNGOLOGY | Facility: CLINIC | Age: 63
End: 2023-01-23
Payer: COMMERCIAL

## 2023-01-23 VITALS — BODY MASS INDEX: 34.92 KG/M2 | WEIGHT: 257.5 LBS | TEMPERATURE: 98 F

## 2023-01-23 DIAGNOSIS — J34.89 FOUL SMELLING DISCHARGE FROM NOSE: ICD-10-CM

## 2023-01-23 DIAGNOSIS — J34.89 SINUS PRESSURE: Primary | ICD-10-CM

## 2023-01-23 PROCEDURE — 99204 OFFICE O/P NEW MOD 45 MIN: CPT | Mod: S$GLB,,, | Performed by: PHYSICIAN ASSISTANT

## 2023-01-23 PROCEDURE — 99204 PR OFFICE/OUTPT VISIT, NEW, LEVL IV, 45-59 MIN: ICD-10-PCS | Mod: S$GLB,,, | Performed by: PHYSICIAN ASSISTANT

## 2023-01-23 PROCEDURE — 99999 PR PBB SHADOW E&M-EST. PATIENT-LVL III: CPT | Mod: PBBFAC,,, | Performed by: PHYSICIAN ASSISTANT

## 2023-01-23 PROCEDURE — 99999 PR PBB SHADOW E&M-EST. PATIENT-LVL III: ICD-10-PCS | Mod: PBBFAC,,, | Performed by: PHYSICIAN ASSISTANT

## 2023-01-23 RX ORDER — FLUTICASONE PROPIONATE 50 MCG
2 SPRAY, SUSPENSION (ML) NASAL 2 TIMES DAILY
Qty: 18.2 ML | Refills: 0 | Status: SHIPPED | OUTPATIENT
Start: 2023-01-23 | End: 2023-02-02

## 2023-01-23 RX ORDER — AMOXICILLIN AND CLAVULANATE POTASSIUM 875; 125 MG/1; MG/1
1 TABLET, FILM COATED ORAL 2 TIMES DAILY
Qty: 20 TABLET | Refills: 0 | Status: SHIPPED | OUTPATIENT
Start: 2023-01-23 | End: 2023-02-02

## 2023-01-23 NOTE — PROGRESS NOTES
Subjective:   Patient ID: Bradley Petty Jr. is a 62 y.o. male.    Chief Complaint: No chief complaint on file.    MAJOR SYMPTOMS:  Yes  Purulent anterior nasal discharge  Yes  Purulent or discolored posterior nasal discharge  Yes  Nasal congestion or obstruction  Yes  Facial Congestion or fullness  No  Hyposmia or anosmia  No  Fever    MINOR SYMPTOMS:  Yes  Headache  Yes  Ear pain, pressure, or fullness  No  Halitosis  No  Dental pain  No  Fatigue    The diagnosis of acute sinusitis is based on the presence of at least 2 major or 1 major and at least 2 minor symptoms.  Bradley does meet this criteria.  Clinical Practice Guideline for Acute Bacterial Rhinosinusitis in Children and Adults. Clin Infect Dis 2012; 54:e72    Onset:  2 month ago  Exacerbating factors: No  Relieving factors: No  Timing:  initially improving now worsening     Review of patient's allergies indicates:  No Known Allergies        Review of Systems   Constitutional:  Negative for fatigue, fever and unexpected weight change.   HENT:  Positive for congestion, sinus pressure and sinus pain. Negative for ear discharge, ear pain, facial swelling, hearing loss, nosebleeds, postnasal drip, rhinorrhea, sneezing, sore throat, tinnitus, trouble swallowing and voice change.         Foul odor in nose   Eyes:  Negative for discharge, redness and itching.   Respiratory:  Negative for cough, choking, shortness of breath and wheezing.    Cardiovascular:  Negative for chest pain and palpitations.   Gastrointestinal:  Negative for abdominal pain.        No reflux.   Musculoskeletal:  Negative for neck pain.   Neurological:  Negative for dizziness, facial asymmetry, light-headedness and headaches.   Hematological:  Negative for adenopathy. Does not bruise/bleed easily.   Psychiatric/Behavioral:  Negative for agitation, behavioral problems, confusion and decreased concentration.        Objective:   Temp 97.5 °F (36.4 °C)   Wt 116.8 kg (257 lb 8 oz)   BMI 34.92  kg/m²     Physical Exam  Constitutional:       General: He is not in acute distress.     Appearance: He is well-developed.   HENT:      Head: Normocephalic and atraumatic.      Jaw: No trismus.      Right Ear: Hearing, tympanic membrane, ear canal and external ear normal.      Left Ear: Hearing, tympanic membrane, ear canal and external ear normal.      Nose: No nasal deformity, septal deviation, mucosal edema or rhinorrhea.      Right Sinus: Maxillary sinus tenderness and frontal sinus tenderness present.      Mouth/Throat:      Dentition: Normal dentition.      Pharynx: Uvula midline. No oropharyngeal exudate or uvula swelling.   Eyes:      General: No scleral icterus.     Conjunctiva/sclera: Conjunctivae normal.      Right eye: Right conjunctiva is not injected. No chemosis.     Left eye: Left conjunctiva is not injected. No chemosis.     Pupils: Pupils are equal, round, and reactive to light.   Neck:      Thyroid: No thyroid mass or thyromegaly.      Trachea: Trachea and phonation normal. No tracheal tenderness or tracheal deviation.   Pulmonary:      Effort: Pulmonary effort is normal. No accessory muscle usage or respiratory distress.      Breath sounds: No stridor.   Lymphadenopathy:      Head:      Right side of head: No submental, submandibular, preauricular or posterior auricular adenopathy.      Left side of head: No submental, submandibular, preauricular or posterior auricular adenopathy.      Cervical: No cervical adenopathy.      Right cervical: No superficial or deep cervical adenopathy.     Left cervical: No superficial or deep cervical adenopathy.   Skin:     General: Skin is warm and dry.      Findings: No erythema or rash.   Neurological:      Mental Status: He is alert and oriented to person, place, and time.      Cranial Nerves: No cranial nerve deficit.   Psychiatric:         Behavior: Behavior normal.         Thought Content: Thought content normal.            Assessment:     1. Sinus pressure     2. Foul smelling discharge from nose        Plan:     Sinus pressure    Foul smelling discharge from nose    Other orders  -     fluticasone propionate (FLONASE) 50 mcg/actuation nasal spray; 2 sprays (100 mcg total) by Each Nostril route 2 (two) times a day. for 10 days  Dispense: 18.2 mL; Refill: 0  -     amoxicillin-clavulanate 875-125mg (AUGMENTIN) 875-125 mg per tablet; Take 1 tablet by mouth 2 (two) times daily. for 10 days  Dispense: 20 tablet; Refill: 0    I have started him on oral antibiotics and would like to see him back in 2 weeks or sooner if needed.

## 2023-02-15 ENCOUNTER — PATIENT MESSAGE (OUTPATIENT)
Dept: INTERNAL MEDICINE | Facility: CLINIC | Age: 63
End: 2023-02-15
Payer: COMMERCIAL

## 2023-02-15 ENCOUNTER — TELEPHONE (OUTPATIENT)
Dept: INTERNAL MEDICINE | Facility: CLINIC | Age: 63
End: 2023-02-15
Payer: COMMERCIAL

## 2023-02-15 NOTE — TELEPHONE ENCOUNTER
----- Message from Patricia Condon sent at 2/15/2023  1:14 PM CST -----  Please call Eric/[employer]@ [phone] regarding pt form fill out for physical, states she have questions about the form,need to make sure everything was check off.

## 2023-02-15 NOTE — LETTER
"  February 16, 2023      The [address]  [address] [facility]  [place] [address]  Phone: [phone]  Fax: [phone]       Patient: Bradley Petty   YOB: 1960  Date of Visit: 02/16/2023    To Whom It May Concern:    The paperwork regarding the patient being fit to be a  was originally completed incorrectly. The question asking if the patient is mentally and physically capable of taking care of a child was marked "no" accidentally. I have given the company permission to change this answer to "yes".    Sincerely,    Jesse Barnes. MD     "
0 = swallows foods/liquids without difficulty

## 2023-02-15 NOTE — TELEPHONE ENCOUNTER
"Eric with the adoption company called regarding the forms completed for the patient recently. She stated that the question that asked if the patient is mentally and physically capable of taking care of a foster child is marked as "no". Was this the correct answer to the question?  "

## 2023-02-16 NOTE — TELEPHONE ENCOUNTER
Eric states that she can change it to say yes. But she will need something sent to her stating what happened. Letterhead created and will be faxed

## 2023-04-05 ENCOUNTER — OFFICE VISIT (OUTPATIENT)
Dept: INTERNAL MEDICINE | Facility: CLINIC | Age: 63
End: 2023-04-05
Payer: COMMERCIAL

## 2023-04-05 VITALS
SYSTOLIC BLOOD PRESSURE: 130 MMHG | WEIGHT: 264.75 LBS | OXYGEN SATURATION: 99 % | TEMPERATURE: 97 F | HEART RATE: 91 BPM | BODY MASS INDEX: 35.86 KG/M2 | DIASTOLIC BLOOD PRESSURE: 84 MMHG | HEIGHT: 72 IN

## 2023-04-05 DIAGNOSIS — R35.1 NOCTURIA: ICD-10-CM

## 2023-04-05 DIAGNOSIS — B35.1 ONYCHOMYCOSIS: ICD-10-CM

## 2023-04-05 DIAGNOSIS — Z00.00 ROUTINE GENERAL MEDICAL EXAMINATION AT A HEALTH CARE FACILITY: Primary | ICD-10-CM

## 2023-04-05 PROCEDURE — 99396 PR PREVENTIVE VISIT,EST,40-64: ICD-10-PCS | Mod: S$GLB,,, | Performed by: INTERNAL MEDICINE

## 2023-04-05 PROCEDURE — 99999 PR PBB SHADOW E&M-EST. PATIENT-LVL IV: CPT | Mod: PBBFAC,,, | Performed by: INTERNAL MEDICINE

## 2023-04-05 PROCEDURE — 99999 PR PBB SHADOW E&M-EST. PATIENT-LVL IV: ICD-10-PCS | Mod: PBBFAC,,, | Performed by: INTERNAL MEDICINE

## 2023-04-05 PROCEDURE — 99396 PREV VISIT EST AGE 40-64: CPT | Mod: S$GLB,,, | Performed by: INTERNAL MEDICINE

## 2023-04-05 RX ORDER — TAMSULOSIN HYDROCHLORIDE 0.4 MG/1
0.8 CAPSULE ORAL DAILY
Qty: 180 CAPSULE | Refills: 1 | Status: SHIPPED | OUTPATIENT
Start: 2023-04-05 | End: 2023-11-01

## 2023-04-05 NOTE — PROGRESS NOTES
Subjective:      Patient ID: Bradley Petty Jr. is a 62 y.o. male.    Chief Complaint: Follow-up    Follow-up  Pertinent negatives include no abdominal pain, chest pain, chills, coughing, fever or sore throat.       62 y.o. with  Patient Active Problem List   Diagnosis    Leukocytopenia, unspecified    Lumbago    Unspecified polyarthropathy or polyarthritis, site unspecified    Fibromyalgia    Depressed    Pain in left shoulder    Cervical radiculitis    Primary open-angle glaucoma(365.11)    Family history of glaucoma    Refractive error    DA on CPAP    Obesity (BMI 30-39.9)    Postinflammatory hyperpigmentation    Hypersomnolence    On pre-exposure prophylaxis for HIV    Decreased ROM of lumbar spine    Weakness of both lower extremities    Sacroiliitis    Bilateral lumbar radiculopathy    Routine general medical examination at a health care facility     Past Medical History:   Diagnosis Date    Acid reflux     Arthralgia     Glaucoma     Hypertension     Sacroiliitis 8/29/2022    Sjogren's syndrome        Here today for annual prev exam.  Compliant with meds without significant side effects. Energy and appetite are good.       Never started Lamisil after warnings provided by pharmacist.  He would like to start this medication.      Past Surgical History:   Procedure Laterality Date    BACK SURGERY      EPIDURAL STEROID INJECTION INTO CERVICAL SPINE N/A 10/21/2022    Procedure: C5/6 IL ALLIE w/RN IV Sedation;  Surgeon: Tiffany Diaz MD;  Location: [facility] PAIN T;  Service: Pain Management;  Laterality: N/A;    INJECTION OF JOINT Right 8/29/2022    Procedure: Right sacroiliac joint + GT bursa injection with RN IV sedation;  Surgeon: Tiffany Diaz MD;  Location: [facility] PAIN T;  Service: Pain Management;  Laterality: Right;    SELECTIVE INJECTION OF ANESTHETIC AGENT AROUND LUMBAR SPINAL NERVE ROOT BY TRANSFORAMINAL APPROACH Bilateral 12/30/2022    Procedure: Bilateral L5/S1 TF ALLIE w/RN IV Sedation;  Surgeon:  Tiffany Diaz MD;  Location: [facility] PAIN MGT;  Service: Pain Management;  Laterality: Bilateral;     Social History     Socioeconomic History    Marital status: Single   Occupational History     Employer: [employer]   Tobacco Use    Smoking status: Never    Smokeless tobacco: Never    Tobacco comments:     Never used   Substance and Sexual Activity    Alcohol use: No    Drug use: Never    Sexual activity: Not Currently     Partners: Female     Birth control/protection: None     family history includes Cancer in his maternal grandmother and mother; Diabetes Mellitus in his maternal grandmother; Glaucoma in his maternal grandmother; Rheum arthritis in his maternal grandmother.  Review of Systems   Constitutional:  Negative for chills and fever.   HENT:  Negative for ear pain and sore throat.    Respiratory:  Negative for cough.    Cardiovascular:  Negative for chest pain.   Gastrointestinal:  Negative for abdominal pain and blood in stool.   Genitourinary:  Negative for dysuria and hematuria.   Neurological:  Negative for seizures and syncope.   Objective:   /84 (BP Location: Right arm, Patient Position: Sitting, BP Method: Large (Manual))   Pulse 91   Temp 96.5 °F (35.8 °C) (Tympanic)   Ht 6' (1.829 m)   Wt 120.1 kg (264 lb 12.4 oz)   SpO2 99%   BMI 35.91 kg/m²     Physical Exam  Constitutional:       General: He is not in acute distress.     Appearance: He is well-developed.   HENT:      Head: Normocephalic and atraumatic.   Eyes:      Extraocular Movements: Extraocular movements intact.   Neck:      Thyroid: No thyromegaly.      Vascular: No carotid bruit.   Cardiovascular:      Rate and Rhythm: Normal rate and regular rhythm.   Pulmonary:      Breath sounds: Normal breath sounds. No wheezing or rales.   Abdominal:      General: Bowel sounds are normal.      Palpations: Abdomen is soft.      Tenderness: There is no abdominal tenderness.   Musculoskeletal:         General: No swelling.      Cervical  back: Neck supple. No rigidity.   Lymphadenopathy:      Cervical: No cervical adenopathy.   Skin:     General: Skin is warm and dry.   Neurological:      Mental Status: He is alert and oriented to person, place, and time.   Psychiatric:         Behavior: Behavior normal.       Lab Results   Component Value Date    WBC 4.43 11/23/2022    HGB 13.0 (L) 11/23/2022    HGB 14.3 02/09/2022    HGB 14.4 02/19/2020    HCT 40.4 11/23/2022    MCV 99 (H) 11/23/2022    MCV 96 02/09/2022    MCV 93.1 02/19/2020     11/23/2022    CHOL 181 04/26/2022    TRIG 86 04/26/2022    HDL 37 (L) 04/26/2022    LDLCALC 126.8 04/26/2022    LDLCALC 146.8 07/06/2017    LDLCALC 48.6 (L) 02/22/2016    ALT 17 12/30/2022    AST 25 12/30/2022     11/23/2022    K 4.2 11/23/2022     11/23/2022    CO2 25 11/23/2022    BUN 12 11/23/2022    CREATININE 1.6 (H) 11/23/2022    CREATININE 1.4 02/09/2022    CREATININE 1.39 (H) 02/19/2020    EGFRNORACEVR 49.0 (A) 11/23/2022    TSH 1.536 04/26/2022    TSH 2.519 07/06/2017    TSH 2.297 02/21/2017    PSA 3.2 04/26/2022    PSA 2.1 07/06/2017    PSA 1.6 10/22/2014    GLU 86 11/23/2022    HGBA1C 5.5 07/06/2017    HGBA1C 5.7 08/26/2015    KAXFSWFS28CJ 59 07/06/2017    TOTALTESTOST 593 08/08/2014          The 10-year ASCVD risk score (Marshall FONTANA, et al., 2019) is: 9.3%    Values used to calculate the score:      Age: 62 years      Sex: Male      Is Non- : Yes      Diabetic: No      Tobacco smoker: No      Systolic Blood Pressure: 130 mmHg      Is BP treated: No      HDL Cholesterol: 37 mg/dL      Total Cholesterol: 181 mg/dL     Assessment:     1. Routine general medical examination at a health care facility    2. Nocturia    3. Onychomycosis      Plan:   1. Routine general medical examination at a health care facility  Overview:  Heart healthy diet and regular exercise.  Health maintenance reviewed    Orders:  -     Comprehensive Metabolic Panel; Future; Expected date:  04/05/2023  -     CBC Auto Differential; Future; Expected date: 04/05/2023  -     TSH; Future; Expected date: 04/05/2023  -     Lipid Panel; Future; Expected date: 04/05/2023  -     Hemoglobin A1C; Future; Expected date: 04/05/2023  -     PSA, Screening; Future; Expected date: 04/05/2023    2. Nocturia  Reports stream has improved but continues with frequency and nocturia.  Will also follow-up with urologist who has him on VESIcare at this time.  Does have some occasional incontinence.  We will try increase Flomax to 0.8.  -     tamsulosin (FLOMAX) 0.4 mg Cap; Take 2 capsules (0.8 mg total) by mouth once daily. At night  Dispense: 180 capsule; Refill: 1    3. Onychomycosis  He reports he will start taking the Lamisil.  -     Hepatic Function Panel; Future; Expected date: 05/17/2023        Patient Instructions   Check with your pharmacy regarding new shingles vaccine.      No future appointments.    Lab Frequency Next Occurrence   Ambulatory referral/consult to Pain Clinic Once 05/23/2022   IR Aspiration Injection Large Joint W FL Once 06/28/2022   IR Aspiration Injection Large Joint W FL Once 06/28/2022   IR ALLIE Cervical/THoracic w/ Img Once 09/28/2022   IR Epidural Transfor 1st Vert Lumbar Michael Once 11/16/2022   Basic Metabolic Panel Every 16 weeks 4/28/2023, 8/18/2023   CBC Auto Differential Every 16 weeks    HIV 1/2 Ag/Ab (4th Gen) Every 16 weeks 7/7/2023       Follow up in about 6 weeks (around 5/17/2023), or if symptoms worsen or fail to improve.  Patient reports colonoscopy with Dr. Hernadez.  We need chart updated via care coordination to update health maintenance tabs.    Labs Friday 730 am.

## 2023-06-22 RX ORDER — EMTRICITABINE AND TENOFOVIR DISOPROXIL FUMARATE 200; 300 MG/1; MG/1
TABLET, FILM COATED ORAL
Qty: 30 TABLET | Refills: 5 | Status: SHIPPED | OUTPATIENT
Start: 2023-06-22 | End: 2023-12-26 | Stop reason: SDUPTHER

## 2023-07-10 PROBLEM — Z00.00 ROUTINE GENERAL MEDICAL EXAMINATION AT A HEALTH CARE FACILITY: Status: RESOLVED | Noted: 2023-04-05 | Resolved: 2023-07-10

## 2023-07-23 DIAGNOSIS — B35.1 ONYCHOMYCOSIS: ICD-10-CM

## 2023-07-24 RX ORDER — TERBINAFINE HYDROCHLORIDE 250 MG/1
TABLET ORAL
Qty: 45 TABLET | Refills: 0 | Status: SHIPPED | OUTPATIENT
Start: 2023-07-24

## 2023-08-16 DIAGNOSIS — G47.33 OSA (OBSTRUCTIVE SLEEP APNEA): Primary | ICD-10-CM

## 2023-08-16 DIAGNOSIS — R35.0 FREQUENCY OF MICTURITION: ICD-10-CM

## 2023-08-16 NOTE — PROGRESS NOTES
Orders Placed This Encounter   Procedures    CPAP FOR HOME USE     Order Specific Question:   Length of need (1-99 months):     Answer:   99     Order Specific Question:   Fulfillment Priority:     Answer:   Level 3:  AHI 15 to <30 with CDL or severe daytime sleepiness     Order Specific Question:   Type ():     Answer:   Auto CPAP     Order Specific Question:   Auto CPAP pressure setting range (cmH20):     Answer:   6-16     Order Specific Question:   Humidification ():     Answer:   Heated     Order Specific Question:   Choose ONE mask type and its corresponding cushions and/or pillows:     Answer:    Full Face Mask, 1 per 90 days:  Full Face Cushion, (3 per 90 days)     Order Specific Question:   Choose EITHER Heated or Non-Heated Tubjing     Answer:    Non-Heated Tubing, 1 per 90 days     Order Specific Question:   All other supplies as needed as listed below:     Answer:    Headgear, 1 per 180 days     Order Specific Question:   All other supplies as needed as listed below:     Answer:    Chin Strap, 1 per 180 days     Order Specific Question:   All other supplies as needed as listed below:     Answer:    Disposable Filter, 6 per 90 days     Order Specific Question:   All other supplies as needed as listed below:     Answer:    Non-Disposable Filter, 1 per 180 days     Order Specific Question:   All other supplies as needed as listed below:     Answer:    Exhalation Port, contact payer for quantity/frequency     Order Specific Question:   All other supplies as needed as listed below:     Answer:    Humidifier Chamber, 1 per 180 days

## 2023-08-24 RX ORDER — SOLIFENACIN SUCCINATE 5 MG/1
5 TABLET, FILM COATED ORAL
Qty: 90 TABLET | Refills: 3 | Status: SHIPPED | OUTPATIENT
Start: 2023-08-24 | End: 2023-08-28

## 2023-08-28 DIAGNOSIS — R35.0 FREQUENCY OF MICTURITION: ICD-10-CM

## 2023-08-28 RX ORDER — SOLIFENACIN SUCCINATE 10 MG/1
10 TABLET, FILM COATED ORAL DAILY
Qty: 90 TABLET | Refills: 3 | Status: SHIPPED | OUTPATIENT
Start: 2023-08-28

## 2023-11-01 DIAGNOSIS — R35.1 NOCTURIA: ICD-10-CM

## 2023-11-01 RX ORDER — TAMSULOSIN HYDROCHLORIDE 0.4 MG/1
0.8 CAPSULE ORAL DAILY
Qty: 180 CAPSULE | Refills: 1 | Status: SHIPPED | OUTPATIENT
Start: 2023-11-01 | End: 2024-02-11

## 2023-11-01 NOTE — TELEPHONE ENCOUNTER
No care due was identified.  [facility] Embedded Care Due Messages. Reference number: [id number].   11/01/2023 9:44:41 AM CDT

## 2023-11-01 NOTE — TELEPHONE ENCOUNTER
Refill Decision Note   Kytere Jovanny  is requesting a refill authorization.  Brief Assessment and Rationale for Refill:  Approve     Medication Therapy Plan:         Comments:     Note composed:10:41 AM 11/01/2023             Vascular lab bilateral lower extremity venous doppler complete, prelim negative dvt - EFREM Rios aware

## 2023-12-26 ENCOUNTER — TELEPHONE (OUTPATIENT)
Dept: INFECTIOUS DISEASES | Facility: CLINIC | Age: 63
End: 2023-12-26
Payer: COMMERCIAL

## 2023-12-26 DIAGNOSIS — Z79.899 ON PRE-EXPOSURE PROPHYLAXIS FOR HIV: Primary | ICD-10-CM

## 2023-12-26 RX ORDER — EMTRICITABINE AND TENOFOVIR DISOPROXIL FUMARATE 200; 300 MG/1; MG/1
1 TABLET, FILM COATED ORAL DAILY
Qty: 30 TABLET | Refills: 5 | Status: CANCELLED | OUTPATIENT
Start: 2023-12-26

## 2023-12-26 RX ORDER — EMTRICITABINE AND TENOFOVIR DISOPROXIL FUMARATE 200; 300 MG/1; MG/1
1 TABLET, FILM COATED ORAL DAILY
Qty: 30 TABLET | Refills: 0 | Status: SHIPPED | OUTPATIENT
Start: 2023-12-26 | End: 2023-12-27 | Stop reason: SDUPTHER

## 2023-12-26 NOTE — TELEPHONE ENCOUNTER
----- Message from Lanie Thomson sent at 12/26/2023  8:04 AM CST -----  Contact: Bradley  Type:  RX Refill Request    Who Called: Pt  Refill or New Rx:refill  RX Name and Strength:emtricitabine-tenofovir 200-300 mg (TRUVADA) 200-300 mg Tab  How is the patient currently taking it? (ex. 1XDay):as prescribed  Is this a 30 day or 90 day RX:30 days  Preferred Pharmacy with phone number:  [facility] [place] [facility] [address]  Local or Mail Order:local  Ordering Provider:Pro  Would the patient rather a call back or a response via [facility]? Call back  Best Call Back Number:[phone].  Additional Information: Pt is going out of town tomorrow and will run out soon.    Thanks  TS

## 2023-12-26 NOTE — TELEPHONE ENCOUNTER
Returned pt's call, informed him that I am sending refill request to Dr. Mast. Pt verbalized understanding.

## 2023-12-27 ENCOUNTER — TELEPHONE (OUTPATIENT)
Dept: INFECTIOUS DISEASES | Facility: CLINIC | Age: 63
End: 2023-12-27
Payer: COMMERCIAL

## 2023-12-27 DIAGNOSIS — Z79.899 ON PRE-EXPOSURE PROPHYLAXIS FOR HIV: ICD-10-CM

## 2023-12-27 DIAGNOSIS — Z79.899 ON PRE-EXPOSURE PROPHYLAXIS FOR HIV: Primary | ICD-10-CM

## 2023-12-27 RX ORDER — EMTRICITABINE AND TENOFOVIR DISOPROXIL FUMARATE 200; 300 MG/1; MG/1
1 TABLET, FILM COATED ORAL DAILY
Qty: 30 TABLET | Refills: 0 | Status: SHIPPED | OUTPATIENT
Start: 2023-12-27 | End: 2024-01-26

## 2023-12-27 NOTE — TELEPHONE ENCOUNTER
----- Message from Mali Camilo sent at 12/27/2023 11:01 AM CST -----  Contact: pt  Pt is calling in regards the med, emtricitabine-tenofovir 200-300 mg (TRUVADA) 200-300 mg Tab was sent to [facility] that is closed.  Please send to [facility] below    [facility]  [address]  [place]  [phone]      Please remove the location of [address] from his preferred pharm list

## 2024-01-03 ENCOUNTER — LAB VISIT (OUTPATIENT)
Dept: LAB | Facility: HOSPITAL | Age: 64
End: 2024-01-03
Attending: INTERNAL MEDICINE
Payer: COMMERCIAL

## 2024-01-03 DIAGNOSIS — Z79.899 ON PRE-EXPOSURE PROPHYLAXIS FOR HIV: ICD-10-CM

## 2024-01-03 LAB
ALBUMIN SERPL BCP-MCNC: 3.8 G/DL (ref 3.5–5.2)
ALP SERPL-CCNC: 71 U/L (ref 55–135)
ALT SERPL W/O P-5'-P-CCNC: 21 U/L (ref 10–44)
ANION GAP SERPL CALC-SCNC: 8 MMOL/L (ref 8–16)
AST SERPL-CCNC: 28 U/L (ref 10–40)
BASOPHILS # BLD AUTO: 0.03 K/UL (ref 0–0.2)
BASOPHILS NFR BLD: 0.8 % (ref 0–1.9)
BILIRUB SERPL-MCNC: 0.5 MG/DL (ref 0.1–1)
BUN SERPL-MCNC: 15 MG/DL (ref 8–23)
CALCIUM SERPL-MCNC: 9.3 MG/DL (ref 8.7–10.5)
CHLORIDE SERPL-SCNC: 104 MMOL/L (ref 95–110)
CO2 SERPL-SCNC: 25 MMOL/L (ref 23–29)
CREAT SERPL-MCNC: 1.5 MG/DL (ref 0.5–1.4)
DIFFERENTIAL METHOD BLD: ABNORMAL
EOSINOPHIL # BLD AUTO: 0.1 K/UL (ref 0–0.5)
EOSINOPHIL NFR BLD: 1.8 % (ref 0–8)
ERYTHROCYTE [DISTWIDTH] IN BLOOD BY AUTOMATED COUNT: 12.1 % (ref 11.5–14.5)
EST. GFR  (NO RACE VARIABLE): 52.3 ML/MIN/1.73 M^2
GLUCOSE SERPL-MCNC: 90 MG/DL (ref 70–110)
HCT VFR BLD AUTO: 42.5 % (ref 40–54)
HGB BLD-MCNC: 13.9 G/DL (ref 14–18)
IMM GRANULOCYTES # BLD AUTO: 0.01 K/UL (ref 0–0.04)
IMM GRANULOCYTES NFR BLD AUTO: 0.3 % (ref 0–0.5)
LYMPHOCYTES # BLD AUTO: 1.8 K/UL (ref 1–4.8)
LYMPHOCYTES NFR BLD: 46 % (ref 18–48)
MCH RBC QN AUTO: 31.5 PG (ref 27–31)
MCHC RBC AUTO-ENTMCNC: 32.7 G/DL (ref 32–36)
MCV RBC AUTO: 96 FL (ref 82–98)
MONOCYTES # BLD AUTO: 0.3 K/UL (ref 0.3–1)
MONOCYTES NFR BLD: 8.4 % (ref 4–15)
NEUTROPHILS # BLD AUTO: 1.6 K/UL (ref 1.8–7.7)
NEUTROPHILS NFR BLD: 42.7 % (ref 38–73)
NRBC BLD-RTO: 0 /100 WBC
PLATELET # BLD AUTO: 245 K/UL (ref 150–450)
PMV BLD AUTO: 9.9 FL (ref 9.2–12.9)
POTASSIUM SERPL-SCNC: 4.3 MMOL/L (ref 3.5–5.1)
PROT SERPL-MCNC: 8 G/DL (ref 6–8.4)
RBC # BLD AUTO: 4.41 M/UL (ref 4.6–6.2)
SODIUM SERPL-SCNC: 137 MMOL/L (ref 136–145)
WBC # BLD AUTO: 3.83 K/UL (ref 3.9–12.7)

## 2024-01-03 PROCEDURE — 85025 COMPLETE CBC W/AUTO DIFF WBC: CPT | Performed by: INTERNAL MEDICINE

## 2024-01-03 PROCEDURE — 80053 COMPREHEN METABOLIC PANEL: CPT | Performed by: INTERNAL MEDICINE

## 2024-01-03 PROCEDURE — 87536 HIV-1 QUANT&REVRSE TRNSCRPJ: CPT | Performed by: INTERNAL MEDICINE

## 2024-01-03 PROCEDURE — 36415 COLL VENOUS BLD VENIPUNCTURE: CPT | Performed by: INTERNAL MEDICINE

## 2024-01-04 ENCOUNTER — TELEPHONE (OUTPATIENT)
Dept: INFECTIOUS DISEASES | Facility: CLINIC | Age: 64
End: 2024-01-04

## 2024-01-04 LAB
HIV1 RNA # SERPL NAA+PROBE: NOT DETECTED COPIES/ML
HIV1 RNA SERPL QL NAA+PROBE: NOT DETECTED

## 2024-01-04 NOTE — TELEPHONE ENCOUNTER
----- Message from Martin Lynn sent at 1/4/2024  7:36 AM CST -----  Patient 1/10 at 9:30 appt at the [facility] with DR Mast was canceled by accident. Pt is still wanting to be seen on that day and time. Please reschedule. Thanks KB

## 2024-01-08 ENCOUNTER — TELEPHONE (OUTPATIENT)
Dept: INFECTIOUS DISEASES | Facility: CLINIC | Age: 64
End: 2024-01-08

## 2024-01-08 NOTE — TELEPHONE ENCOUNTER
----- Message from Mali Camilo sent at 1/8/2024  7:20 AM CST -----  Contact: pt  Pt is calling in regard to his appt this morning and he says he never goes to [place] and need to know if this is correct.  He has always gone to [facility].  Please advise the pt at  [phone] thanks/mpd

## 2024-01-10 ENCOUNTER — OFFICE VISIT (OUTPATIENT)
Dept: INFECTIOUS DISEASES | Facility: CLINIC | Age: 64
End: 2024-01-10
Payer: COMMERCIAL

## 2024-01-10 ENCOUNTER — PATIENT OUTREACH (OUTPATIENT)
Dept: ADMINISTRATIVE | Facility: HOSPITAL | Age: 64
End: 2024-01-10

## 2024-01-10 VITALS
BODY MASS INDEX: 35.78 KG/M2 | WEIGHT: 264.13 LBS | HEIGHT: 72 IN | DIASTOLIC BLOOD PRESSURE: 90 MMHG | SYSTOLIC BLOOD PRESSURE: 142 MMHG

## 2024-01-10 DIAGNOSIS — Z79.899 ON PRE-EXPOSURE PROPHYLAXIS FOR HIV: Primary | ICD-10-CM

## 2024-01-10 DIAGNOSIS — F32.A DEPRESSION, UNSPECIFIED DEPRESSION TYPE: ICD-10-CM

## 2024-01-10 PROCEDURE — 3008F BODY MASS INDEX DOCD: CPT | Mod: CPTII,S$GLB,, | Performed by: INTERNAL MEDICINE

## 2024-01-10 PROCEDURE — 3077F SYST BP >= 140 MM HG: CPT | Mod: CPTII,S$GLB,, | Performed by: INTERNAL MEDICINE

## 2024-01-10 PROCEDURE — 3080F DIAST BP >= 90 MM HG: CPT | Mod: CPTII,S$GLB,, | Performed by: INTERNAL MEDICINE

## 2024-01-10 PROCEDURE — 99999 PR PBB SHADOW E&M-EST. PATIENT-LVL III: CPT | Mod: PBBFAC,,, | Performed by: INTERNAL MEDICINE

## 2024-01-10 PROCEDURE — 99214 OFFICE O/P EST MOD 30 MIN: CPT | Mod: S$GLB,,, | Performed by: INTERNAL MEDICINE

## 2024-01-10 PROCEDURE — 1159F MED LIST DOCD IN RCRD: CPT | Mod: CPTII,S$GLB,, | Performed by: INTERNAL MEDICINE

## 2024-01-10 RX ORDER — EMTRICITABINE AND TENOFOVIR DISOPROXIL FUMARATE 200; 300 MG/1; MG/1
1 TABLET, FILM COATED ORAL DAILY
Qty: 30 TABLET | Refills: 5 | Status: SHIPPED | OUTPATIENT
Start: 2024-01-10 | End: 2025-01-09

## 2024-01-10 NOTE — ASSESSMENT & PLAN NOTE
Will continue Truvada - will monitor serum creatinine ,needs follow up  .  Encouraged to practice healthy sexual habit

## 2024-01-10 NOTE — PROGRESS NOTES
Subjective     Patient ID: Bradley Petty Jr. is a 63 y.o. male.    Chief Complaint: Follow-up (PREP)    HPI    63 year old man on Truvda for pREP.  He is bisexual and now with a female partner.  omponent Ref Range & Units 7 d ago   HIV-1 ULTRAQUANT <20 Copies/mL Not Detected   HIV-1 RNA, QUALITATIVE Not Detected Not Detected     Component      Latest Ref Rng 5/13/2019 2/19/2020 2/9/2022 11/23/2022 1/3/2024   Creatinine      0.5 - 1.4 mg/dL 1.5 (H)  1.39 (H)  1.4  1.6 (H)  1.5 (H)       Review of Systems   Constitutional:  Negative for activity change, appetite change, chills and diaphoresis.   Neurological:  Negative for dizziness and coordination difficulties.   Hematological:  Negative for adenopathy.          Objective     Physical Exam  Vitals and nursing note reviewed.   Constitutional:       Appearance: He is well-developed.   HENT:      Head: Normocephalic and atraumatic.      Nose: Nose normal.   Eyes:      Comments: PERRL   Cardiovascular:      Rate and Rhythm: Normal rate and regular rhythm.      Heart sounds: Normal heart sounds.   Pulmonary:      Effort: Pulmonary effort is normal. No respiratory distress.      Breath sounds: Normal breath sounds. No wheezing or rales.   Abdominal:      Tenderness: There is no abdominal tenderness.   Musculoskeletal:         General: Normal range of motion.      Cervical back: Normal range of motion and neck supple.   Skin:     General: Skin is dry.   Neurological:      Mental Status: He is alert and oriented to person, place, and time.            Assessment and Plan     Problem List Items Addressed This Visit       Depressed     Will follow PCP         On pre-exposure prophylaxis for HIV - Primary     Will continue Truvada - will monitor serum creatinine ,needs follow up  .  Encouraged to practice healthy sexual habit               Well healed.

## 2024-01-12 ENCOUNTER — PATIENT OUTREACH (OUTPATIENT)
Dept: ADMINISTRATIVE | Facility: HOSPITAL | Age: 64
End: 2024-01-12

## 2024-01-12 DIAGNOSIS — Z12.11 SCREEN FOR COLON CANCER: Primary | ICD-10-CM

## 2024-01-12 NOTE — PROGRESS NOTES
Received message via EBER in box that patient would like to schedule colonoscopy.Referral entered to endo  for colonoscopy   no

## 2024-01-19 ENCOUNTER — PATIENT MESSAGE (OUTPATIENT)
Dept: INTERNAL MEDICINE | Facility: CLINIC | Age: 64
End: 2024-01-19

## 2024-01-31 ENCOUNTER — OFFICE VISIT (OUTPATIENT)
Dept: OTOLARYNGOLOGY | Facility: CLINIC | Age: 64
End: 2024-01-31
Payer: COMMERCIAL

## 2024-01-31 VITALS — WEIGHT: 268.06 LBS | BODY MASS INDEX: 36.36 KG/M2

## 2024-01-31 DIAGNOSIS — J01.40 ACUTE NON-RECURRENT PANSINUSITIS: Primary | ICD-10-CM

## 2024-01-31 PROCEDURE — 1159F MED LIST DOCD IN RCRD: CPT | Mod: CPTII,S$GLB,, | Performed by: OTOLARYNGOLOGY

## 2024-01-31 PROCEDURE — 99214 OFFICE O/P EST MOD 30 MIN: CPT | Mod: S$GLB,,, | Performed by: OTOLARYNGOLOGY

## 2024-01-31 PROCEDURE — 99999 PR PBB SHADOW E&M-EST. PATIENT-LVL III: CPT | Mod: PBBFAC,,, | Performed by: OTOLARYNGOLOGY

## 2024-01-31 PROCEDURE — 3008F BODY MASS INDEX DOCD: CPT | Mod: CPTII,S$GLB,, | Performed by: OTOLARYNGOLOGY

## 2024-01-31 RX ORDER — METHYLPREDNISOLONE 4 MG/1
TABLET ORAL
Qty: 1 EACH | Refills: 0 | Status: SHIPPED | OUTPATIENT
Start: 2024-01-31 | End: 2024-04-05

## 2024-01-31 RX ORDER — AMOXICILLIN AND CLAVULANATE POTASSIUM 875; 125 MG/1; MG/1
1 TABLET, FILM COATED ORAL 2 TIMES DAILY
Qty: 20 TABLET | Refills: 0 | Status: SHIPPED | OUTPATIENT
Start: 2024-01-31 | End: 2024-02-10

## 2024-01-31 RX ORDER — FLUTICASONE PROPIONATE 50 MCG
2 SPRAY, SUSPENSION (ML) NASAL DAILY
Qty: 16 G | Refills: 3 | Status: SHIPPED | OUTPATIENT
Start: 2024-01-31 | End: 2024-04-05

## 2024-01-31 NOTE — PATIENT INSTRUCTIONS
"Dr. Chu's Sinus Protocol    Sinusitis is caused by inflammation creating blockage in the natural drainage pathways of the sinuses.  This "Sinus Protocol" is designed to open, flush out and decrease the inflammation within those pathways.      Day 1-3 (Perform 2x per day)     Afrin (pump spray mist OTC) 2 sprays in each nostril   Yg Med Sinus Wash - Make sure you use distilled water!  Fluticasone nasal spray 2 sprays in each nostril      Days 4 - follow up visit (perform 1x per day)    Yg Med Sinus wash  Fluticasone nasal spray 2 sprays in each nostril        *TAKE ALL OTHER MEDICATIONS AS DIRECTED BY DR CHU  *MAKE SURE YOU STOP USING AFRIN AFTER THE 3RD DAY AS THERE IS A RISK OF BECOMING DEPENDENT ON THAT MEDICATION    "

## 2024-01-31 NOTE — PROGRESS NOTES
Referring Provider:    Self, Aaareferral  No address on file  Subjective:   Patient: Bradley Petty Jr. [id number], :1960   Visit date:2024 9:28 AM    Chief Complaint:  Sinus Problem (Pt is coming in today for a sinus infection ongoing for about a month and he states that his sinuses are swollen and there a strange smell in his nose)    HPI:    Prior notes reviewed by myself.  Clinical documentation obtained by nursing staff reviewed.     64 y/o gentleman here for evaluation of possible sinus infection.  Noticed some infraorbital swelling, congestion, rhinorrhea, postnasal drip for the last month.  He has tried some over-the-counter medication without relief.  He has not been on any antibiotics.  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    Nose:  No masses/lesions of external nose, congested nasal mucosa, septum, and turbinates were within normal limits. Mucoid rhinorrhea    Mouth:  No mass/lesion of lips, teeth, gums, hard/soft palate, tongue, tonsils, or oropharynx.    Neck & Lymphatics:  No cervical lymphadenopathy, no neck mass/crepitus/ asymmetry, trachea is midline, no thyroid enlargement/tenderness/mass.        [x]  Data Reviewed:    Lab Results   Component Value Date    WBC 3.83 (L) 2024    HGB 13.9 (L) 2024    HCT 42.5 2024    MCV 96 2024    EOSINOPHIL 1.8 2024             Assessment & Plan:   Acute non-recurrent pansinusitis  -     amoxicillin-clavulanate 875-125mg (AUGMENTIN) 875-125 mg per tablet; Take 1 tablet by mouth 2 (two) times daily. for 10 days  Dispense: 20 tablet; Refill:  "0  -     methylPREDNISolone (MEDROL DOSEPACK) 4 mg tablet; use as directed  Dispense: 1 each; Refill: 0  -     fluticasone propionate (FLONASE) 50 mcg/actuation nasal spray; 2 sprays (100 mcg total) by Each Nostril route once daily.  Dispense: 16 g; Refill: 3      He has had sinusitis symptoms for 3-4 weeks.  We agreed to treat with a round of Augmentin and oral steroids as well as my sinus protocol.  He will follow up p.r.n.    Dr. Chu's Sinus Protocol    Sinusitis is caused by inflammation creating blockage in the natural drainage pathways of the sinuses.  This "Sinus Protocol" is designed to open, flush out and decrease the inflammation within those pathways.      Day 1-3 (Perform 2x per day)     Afrin (pump spray mist OTC) 2 sprays in each nostril   Yg Med Sinus Wash - Make sure you use distilled water!  Fluticasone nasal spray 2 sprays in each nostril      Days 4 - follow up visit (perform 1x per day)    Yg Med Sinus wash  Fluticasone nasal spray 2 sprays in each nostril        *TAKE ALL OTHER MEDICATIONS AS DIRECTED BY DR CHU  *MAKE SURE YOU STOP USING AFRIN AFTER THE 3RD DAY AS THERE IS A RISK OF BECOMING DEPENDENT ON THAT MEDICATION      "

## 2024-02-10 DIAGNOSIS — R35.1 NOCTURIA: ICD-10-CM

## 2024-02-11 RX ORDER — TAMSULOSIN HYDROCHLORIDE 0.4 MG/1
0.8 CAPSULE ORAL DAILY
Qty: 180 CAPSULE | Refills: 0 | Status: SHIPPED | OUTPATIENT
Start: 2024-02-11 | End: 2024-04-05 | Stop reason: SDUPTHER

## 2024-02-11 NOTE — TELEPHONE ENCOUNTER
Refill Decision Note   Kytere Jovanny  is requesting a refill authorization.  Brief Assessment and Rationale for Refill:  Approve     Medication Therapy Plan:        Comments:     Note composed:3:04 PM 02/11/2024           
No care due was identified.  [facility] Embedded Care Due Messages. Reference number: [id number].   2/10/2024 3:44:22 PM CST  
Homemaker

## 2024-04-05 ENCOUNTER — OFFICE VISIT (OUTPATIENT)
Dept: INTERNAL MEDICINE | Facility: CLINIC | Age: 64
End: 2024-04-05
Payer: COMMERCIAL

## 2024-04-05 ENCOUNTER — LAB VISIT (OUTPATIENT)
Dept: LAB | Facility: HOSPITAL | Age: 64
End: 2024-04-05
Attending: INTERNAL MEDICINE
Payer: COMMERCIAL

## 2024-04-05 VITALS
WEIGHT: 266.13 LBS | BODY MASS INDEX: 36.04 KG/M2 | HEIGHT: 72 IN | OXYGEN SATURATION: 98 % | SYSTOLIC BLOOD PRESSURE: 136 MMHG | TEMPERATURE: 98 F | DIASTOLIC BLOOD PRESSURE: 80 MMHG | HEART RATE: 113 BPM

## 2024-04-05 DIAGNOSIS — R68.82 DECREASED LIBIDO: ICD-10-CM

## 2024-04-05 DIAGNOSIS — Z00.00 ROUTINE GENERAL MEDICAL EXAMINATION AT A HEALTH CARE FACILITY: ICD-10-CM

## 2024-04-05 DIAGNOSIS — Z00.00 ROUTINE GENERAL MEDICAL EXAMINATION AT A HEALTH CARE FACILITY: Primary | ICD-10-CM

## 2024-04-05 DIAGNOSIS — Z12.11 COLON CANCER SCREENING: ICD-10-CM

## 2024-04-05 DIAGNOSIS — R35.1 NOCTURIA: ICD-10-CM

## 2024-04-05 PROCEDURE — 3079F DIAST BP 80-89 MM HG: CPT | Mod: CPTII,S$GLB,, | Performed by: INTERNAL MEDICINE

## 2024-04-05 PROCEDURE — 84443 ASSAY THYROID STIM HORMONE: CPT | Performed by: INTERNAL MEDICINE

## 2024-04-05 PROCEDURE — 80061 LIPID PANEL: CPT | Performed by: INTERNAL MEDICINE

## 2024-04-05 PROCEDURE — 3008F BODY MASS INDEX DOCD: CPT | Mod: CPTII,S$GLB,, | Performed by: INTERNAL MEDICINE

## 2024-04-05 PROCEDURE — 84403 ASSAY OF TOTAL TESTOSTERONE: CPT | Performed by: INTERNAL MEDICINE

## 2024-04-05 PROCEDURE — 36415 COLL VENOUS BLD VENIPUNCTURE: CPT | Performed by: INTERNAL MEDICINE

## 2024-04-05 PROCEDURE — 1159F MED LIST DOCD IN RCRD: CPT | Mod: CPTII,S$GLB,, | Performed by: INTERNAL MEDICINE

## 2024-04-05 PROCEDURE — 82607 VITAMIN B-12: CPT | Performed by: INTERNAL MEDICINE

## 2024-04-05 PROCEDURE — 99999 PR PBB SHADOW E&M-EST. PATIENT-LVL V: CPT | Mod: PBBFAC,,, | Performed by: INTERNAL MEDICINE

## 2024-04-05 PROCEDURE — 99396 PREV VISIT EST AGE 40-64: CPT | Mod: S$GLB,,, | Performed by: INTERNAL MEDICINE

## 2024-04-05 PROCEDURE — 3075F SYST BP GE 130 - 139MM HG: CPT | Mod: CPTII,S$GLB,, | Performed by: INTERNAL MEDICINE

## 2024-04-05 PROCEDURE — 82746 ASSAY OF FOLIC ACID SERUM: CPT | Performed by: INTERNAL MEDICINE

## 2024-04-05 PROCEDURE — 85025 COMPLETE CBC W/AUTO DIFF WBC: CPT | Performed by: INTERNAL MEDICINE

## 2024-04-05 PROCEDURE — 80053 COMPREHEN METABOLIC PANEL: CPT | Performed by: INTERNAL MEDICINE

## 2024-04-05 PROCEDURE — 86592 SYPHILIS TEST NON-TREP QUAL: CPT | Performed by: INTERNAL MEDICINE

## 2024-04-05 PROCEDURE — 84153 ASSAY OF PSA TOTAL: CPT | Performed by: INTERNAL MEDICINE

## 2024-04-05 PROCEDURE — 3044F HG A1C LEVEL LT 7.0%: CPT | Mod: CPTII,S$GLB,, | Performed by: INTERNAL MEDICINE

## 2024-04-05 PROCEDURE — 83036 HEMOGLOBIN GLYCOSYLATED A1C: CPT | Performed by: INTERNAL MEDICINE

## 2024-04-05 RX ORDER — TRAZODONE HYDROCHLORIDE 50 MG/1
TABLET ORAL
Qty: 30 TABLET | Refills: 6 | Status: SHIPPED | OUTPATIENT
Start: 2024-04-05

## 2024-04-05 RX ORDER — TAMSULOSIN HYDROCHLORIDE 0.4 MG/1
0.8 CAPSULE ORAL DAILY
Qty: 180 CAPSULE | Refills: 0 | Status: SHIPPED | OUTPATIENT
Start: 2024-04-05

## 2024-04-05 NOTE — PROGRESS NOTES
Subjective:      Patient ID: Bradley Petty Jr. is a 63 y.o. male.    Chief Complaint: Annual Exam    HPI      63 y.o. with  Patient Active Problem List   Diagnosis    Leukocytopenia, unspecified    Lumbago    Unspecified polyarthropathy or polyarthritis, site unspecified    Fibromyalgia    Depressed    Pain in left shoulder    Cervical radiculitis    Primary open-angle glaucoma(365.11)    Family history of glaucoma    Refractive error    Routine general medical examination at a health care facility    DA on CPAP    Obesity (BMI 30-39.9)    Postinflammatory hyperpigmentation    Hypersomnolence    On pre-exposure prophylaxis for HIV    Decreased ROM of lumbar spine    Weakness of both lower extremities    Sacroiliitis    Bilateral lumbar radiculopathy     Past Medical History:   Diagnosis Date    Acid reflux     Arthralgia     Glaucoma     Hypertension     Sacroiliitis 8/29/2022    Sjogren's syndrome        Here today for annual prev exam.  Compliant with meds without significant side effects. Energy and appetite are good.     Has h/o cpap. Diff tolerating and has not been using.     Has some decreased libido and occ diff with erection.    Also concerned with memory. Diff recalling names. No getting lost.  No dangerous situations.        Past Surgical History:   Procedure Laterality Date    BACK SURGERY      EPIDURAL STEROID INJECTION INTO CERVICAL SPINE N/A 10/21/2022    Procedure: C5/6 IL ALLIE w/RN IV Sedation;  Surgeon: Tiffany Diaz MD;  [place] [facility] [place] [place]  Service: Pain Management;  Laterality: N/A;    INJECTION OF JOINT Right 8/29/2022    Procedure: Right sacroiliac joint + GT bursa injection with RN IV sedation;  Surgeon: Tiffany Diaz MD;  [place] [facility] [place]  Service: Pain Management;  Laterality: Right;    SELECTIVE INJECTION OF ANESTHETIC AGENT AROUND LUMBAR SPINAL NERVE ROOT BY TRANSFORAMINAL APPROACH Bilateral 12/30/2022    Procedure: Bilateral L5/S1 TF ALLIE w/RN IV Sedation;  Surgeon:  Tiffany Diaz MD;  Location: [facility];  Service: Pain Management;  Laterality: Bilateral;     Social History     Socioeconomic History    Marital status: Single   Occupational History     Employer: [employer]   Tobacco Use    Smoking status: Never    Smokeless tobacco: Never    Tobacco comments:     Never used   Substance and Sexual Activity    Alcohol use: No    Drug use: Never    Sexual activity: Not Currently     Partners: Female     Birth control/protection: None     family history includes Cancer in his maternal grandmother and mother; Diabetes Mellitus in his maternal grandmother; Glaucoma in his maternal grandmother; Rheum arthritis in his maternal grandmother.    Review of Systems   Constitutional:  Negative for chills and fever.   HENT:  Negative for ear pain and sore throat.    Respiratory:  Negative for cough.    Cardiovascular:  Negative for chest pain.   Gastrointestinal:  Negative for abdominal pain and blood in stool.   Genitourinary:  Negative for dysuria and hematuria.   Skin:  Negative for rash and wound.   Neurological:  Negative for seizures and syncope.     Objective:   /80 (BP Location: Right arm, Patient Position: Sitting, BP Method: Large (Manual))   Pulse (!) 113   Temp 98.2 °F (36.8 °C) (Tympanic)   Ht 6' (1.829 m)   Wt 120.7 kg (266 lb 1.5 oz)   SpO2 98%   BMI 36.09 kg/m²     Physical Exam  Constitutional:       General: He is not in acute distress.     Appearance: He is well-developed.   HENT:      Head: Normocephalic and atraumatic.   Eyes:      Extraocular Movements: Extraocular movements intact.   Neck:      Thyroid: No thyromegaly.   Cardiovascular:      Rate and Rhythm: Normal rate and regular rhythm.   Pulmonary:      Breath sounds: Normal breath sounds. No wheezing or rales.   Abdominal:      General: Bowel sounds are normal.      Palpations: Abdomen is soft.      Tenderness: There is no abdominal tenderness.   Musculoskeletal:         General: No swelling.       Cervical back: Neck supple. No rigidity.   Lymphadenopathy:      Cervical: No cervical adenopathy.   Skin:     General: Skin is warm and dry.   Neurological:      Mental Status: He is alert and oriented to person, place, and time.   Psychiatric:         Mood and Affect: Mood normal.         Behavior: Behavior normal.         Lab Results   Component Value Date    WBC 4.44 04/05/2024    HGB 14.3 04/05/2024    HGB 13.9 (L) 01/03/2024    HGB 13.0 (L) 11/23/2022    HCT 43.7 04/05/2024    MCV 99 (H) 04/05/2024    MCV 96 01/03/2024    MCV 99 (H) 11/23/2022     04/05/2024    CHOL 196 04/05/2024    TRIG 151 (H) 04/05/2024    HDL 35 (L) 04/05/2024    LDLCALC 130.8 04/05/2024    LDLCALC 126.8 04/26/2022    LDLCALC 146.8 07/06/2017    ALT 18 04/05/2024    AST 26 04/05/2024     04/05/2024    K 4.3 04/05/2024    CALCIUM 9.5 04/05/2024     04/05/2024    CO2 27 04/05/2024    BUN 15 04/05/2024    CREATININE 1.4 04/05/2024    CREATININE 1.5 (H) 01/03/2024    CREATININE 1.6 (H) 11/23/2022    EGFRNORACEVR 56.8 (A) 04/05/2024    EGFRNORACEVR 52.3 (A) 01/03/2024    EGFRNORACEVR 49.0 (A) 11/23/2022    TSH 2.260 04/05/2024    TSH 1.536 04/26/2022    TSH 2.519 07/06/2017    PSA 5.7 (H) 04/05/2024    PSA 3.2 04/26/2022    PSA 2.1 07/06/2017    GLU 91 04/05/2024    HGBA1C 5.5 04/05/2024    HGBA1C 5.5 07/06/2017    HGBA1C 5.7 08/26/2015    ILDDDCTM01JJ 59 07/06/2017    TOTALTESTOST 493 04/05/2024    TOTALTESTOST 593 08/08/2014          The 10-year ASCVD risk score (Marshall FONTANA, et al., 2019) is: 10.9%    Values used to calculate the score:      Age: 63 years      Sex: Male      Is Non- : Yes      Diabetic: No      Tobacco smoker: No      Systolic Blood Pressure: 136 mmHg      Is BP treated: No      HDL Cholesterol: 35 mg/dL      Total Cholesterol: 196 mg/dL     Assessment:     1. Routine general medical examination at a health care facility    2. Nocturia    3. Colon cancer screening    4. Decreased  libido      Plan:   1. Routine general medical examination at a health care facility  Overview:  Heart healthy diet and regular exercise.  Health maintenance reviewed patient    Orders:  -     Comprehensive Metabolic Panel; Future; Expected date: 04/05/2024  -     CBC Auto Differential; Future; Expected date: 04/05/2024  -     TSH; Future; Expected date: 04/05/2024  -     Lipid Panel; Future; Expected date: 04/05/2024  -     Hemoglobin A1C; Future; Expected date: 04/05/2024  -     PSA, Screening; Future; Expected date: 04/05/2024  -     Testosterone; Future; Expected date: 04/05/2024  -     Vitamin B12; Future; Expected date: 04/05/2024  -     Folate; Future; Expected date: 04/05/2024  -     traZODone (DESYREL) 50 MG tablet; 1 to 2 tabs po qhs prn sleep.  Dispense: 30 tablet; Refill: 6  -     Ambulatory referral/consult to Pulmonology; Future; Expected date: 04/12/2024  -     RPR; Future; Expected date: 04/05/2024    2. Nocturia  -     tamsulosin (FLOMAX) 0.4 mg Cap; Take 2 capsules (0.8 mg total) by mouth once daily. At night  Dispense: 180 capsule; Refill: 0    3. Colon cancer screening  -     Cancel: Ambulatory referral/consult to Endo Procedure ; Future; Expected date: 04/06/2024    4. Decreased libido    Check testosterone level.    Patient Instructions   Check with your pharmacy regarding rsv  shingles vaccine.      Future Appointments   Date Time Provider Department Center   7/1/2024  2:30 PM Lejeune, Elizabeth B, NP [facility] [place]   7/10/2024 11:00 AM Abdi Mast MD, [facility] ON [facility]  Medical C   4/4/2025  2:40 PM Jesse Barnes MD [facility] IM [place]       Lab Frequency Next Occurrence   Comprehensive Metabolic Panel Once 04/05/2023   TSH Once 04/05/2023   Lipid Panel Once 04/05/2023   Ambulatory referral/consult to Endo Procedure  Once 01/13/2024       Follow up in about 1 year (around 4/5/2025), or if symptoms worsen or fail to improve, for fasting labs wednesdayp.

## 2024-04-06 LAB
ALBUMIN SERPL BCP-MCNC: 4 G/DL (ref 3.5–5.2)
ALP SERPL-CCNC: 85 U/L (ref 55–135)
ALT SERPL W/O P-5'-P-CCNC: 18 U/L (ref 10–44)
ANION GAP SERPL CALC-SCNC: 8 MMOL/L (ref 8–16)
AST SERPL-CCNC: 26 U/L (ref 10–40)
BASOPHILS # BLD AUTO: 0.03 K/UL (ref 0–0.2)
BASOPHILS NFR BLD: 0.7 % (ref 0–1.9)
BILIRUB SERPL-MCNC: 0.4 MG/DL (ref 0.1–1)
BUN SERPL-MCNC: 15 MG/DL (ref 8–23)
CALCIUM SERPL-MCNC: 9.5 MG/DL (ref 8.7–10.5)
CHLORIDE SERPL-SCNC: 103 MMOL/L (ref 95–110)
CHOLEST SERPL-MCNC: 196 MG/DL (ref 120–199)
CHOLEST/HDLC SERPL: 5.6 {RATIO} (ref 2–5)
CO2 SERPL-SCNC: 27 MMOL/L (ref 23–29)
COMPLEXED PSA SERPL-MCNC: 5.7 NG/ML (ref 0–4)
CREAT SERPL-MCNC: 1.4 MG/DL (ref 0.5–1.4)
DIFFERENTIAL METHOD BLD: ABNORMAL
EOSINOPHIL # BLD AUTO: 0.2 K/UL (ref 0–0.5)
EOSINOPHIL NFR BLD: 3.6 % (ref 0–8)
ERYTHROCYTE [DISTWIDTH] IN BLOOD BY AUTOMATED COUNT: 12.3 % (ref 11.5–14.5)
EST. GFR  (NO RACE VARIABLE): 56.8 ML/MIN/1.73 M^2
ESTIMATED AVG GLUCOSE: 111 MG/DL (ref 68–131)
FOLATE SERPL-MCNC: 13.2 NG/ML (ref 4–24)
GLUCOSE SERPL-MCNC: 91 MG/DL (ref 70–110)
HBA1C MFR BLD: 5.5 % (ref 4–5.6)
HCT VFR BLD AUTO: 43.7 % (ref 40–54)
HDLC SERPL-MCNC: 35 MG/DL (ref 40–75)
HDLC SERPL: 17.9 % (ref 20–50)
HGB BLD-MCNC: 14.3 G/DL (ref 14–18)
IMM GRANULOCYTES # BLD AUTO: 0 K/UL (ref 0–0.04)
IMM GRANULOCYTES NFR BLD AUTO: 0 % (ref 0–0.5)
LDLC SERPL CALC-MCNC: 130.8 MG/DL (ref 63–159)
LYMPHOCYTES # BLD AUTO: 2.1 K/UL (ref 1–4.8)
LYMPHOCYTES NFR BLD: 47.1 % (ref 18–48)
MCH RBC QN AUTO: 32.3 PG (ref 27–31)
MCHC RBC AUTO-ENTMCNC: 32.7 G/DL (ref 32–36)
MCV RBC AUTO: 99 FL (ref 82–98)
MONOCYTES # BLD AUTO: 0.4 K/UL (ref 0.3–1)
MONOCYTES NFR BLD: 7.9 % (ref 4–15)
NEUTROPHILS # BLD AUTO: 1.8 K/UL (ref 1.8–7.7)
NEUTROPHILS NFR BLD: 40.7 % (ref 38–73)
NONHDLC SERPL-MCNC: 161 MG/DL
NRBC BLD-RTO: 0 /100 WBC
PLATELET # BLD AUTO: 209 K/UL (ref 150–450)
PMV BLD AUTO: 10.8 FL (ref 9.2–12.9)
POTASSIUM SERPL-SCNC: 4.3 MMOL/L (ref 3.5–5.1)
PROT SERPL-MCNC: 8.3 G/DL (ref 6–8.4)
RBC # BLD AUTO: 4.43 M/UL (ref 4.6–6.2)
SODIUM SERPL-SCNC: 138 MMOL/L (ref 136–145)
TESTOST SERPL-MCNC: 493 NG/DL (ref 304–1227)
TRIGL SERPL-MCNC: 151 MG/DL (ref 30–150)
TSH SERPL DL<=0.005 MIU/L-ACNC: 2.26 UIU/ML (ref 0.4–4)
VIT B12 SERPL-MCNC: >2000 PG/ML (ref 210–950)
WBC # BLD AUTO: 4.44 K/UL (ref 3.9–12.7)

## 2024-04-08 ENCOUNTER — TELEPHONE (OUTPATIENT)
Dept: INTERNAL MEDICINE | Facility: CLINIC | Age: 64
End: 2024-04-08

## 2024-04-08 LAB — RPR SER QL: NORMAL

## 2024-04-08 NOTE — TELEPHONE ENCOUNTER
----- Message from Evelyn Dumas sent at 4/8/2024  7:57 AM CDT -----  States he would like a call back regarding his appt. He would like the nurse to give him a call. Please call Ky Petty [phone]. Thank you

## 2024-06-18 ENCOUNTER — OFFICE VISIT (OUTPATIENT)
Dept: URGENT CARE | Facility: CLINIC | Age: 64
End: 2024-06-18
Payer: COMMERCIAL

## 2024-06-18 VITALS
HEART RATE: 92 BPM | BODY MASS INDEX: 37.94 KG/M2 | TEMPERATURE: 98 F | RESPIRATION RATE: 18 BRPM | WEIGHT: 265 LBS | OXYGEN SATURATION: 98 % | SYSTOLIC BLOOD PRESSURE: 144 MMHG | DIASTOLIC BLOOD PRESSURE: 80 MMHG | HEIGHT: 70 IN

## 2024-06-18 DIAGNOSIS — M25.511 RIGHT SHOULDER PAIN, UNSPECIFIED CHRONICITY: Primary | ICD-10-CM

## 2024-06-18 DIAGNOSIS — M79.10 MUSCLE ACHE: ICD-10-CM

## 2024-06-18 DIAGNOSIS — R03.0 ELEVATED BP WITHOUT DIAGNOSIS OF HYPERTENSION: ICD-10-CM

## 2024-06-18 PROCEDURE — 96372 THER/PROPH/DIAG INJ SC/IM: CPT | Mod: S$GLB,,, | Performed by: EMERGENCY MEDICINE

## 2024-06-18 PROCEDURE — 99214 OFFICE O/P EST MOD 30 MIN: CPT | Mod: 25,S$GLB,, | Performed by: NURSE PRACTITIONER

## 2024-06-18 RX ORDER — KETOROLAC TROMETHAMINE 30 MG/ML
30 INJECTION, SOLUTION INTRAMUSCULAR; INTRAVENOUS
Status: COMPLETED | OUTPATIENT
Start: 2024-06-18 | End: 2024-06-18

## 2024-06-18 RX ORDER — IBUPROFEN 600 MG/1
600 TABLET ORAL 3 TIMES DAILY
Qty: 21 TABLET | Refills: 0 | Status: SHIPPED | OUTPATIENT
Start: 2024-06-18 | End: 2024-06-25

## 2024-06-18 RX ORDER — METHOCARBAMOL 500 MG/1
500 TABLET, FILM COATED ORAL 4 TIMES DAILY
Qty: 40 TABLET | Refills: 0 | Status: SHIPPED | OUTPATIENT
Start: 2024-06-18 | End: 2024-06-28

## 2024-06-18 RX ADMIN — KETOROLAC TROMETHAMINE 30 MG: 30 INJECTION, SOLUTION INTRAMUSCULAR; INTRAVENOUS at 02:06

## 2024-06-18 NOTE — PROGRESS NOTES
"Subjective:      Patient ID: Bradley Petty Jr. is a 63 y.o. male.    Vitals:  height is 5' 9.76" (1.772 m) and weight is 120.2 kg (264 lb 15.9 oz). His tympanic temperature is 97.8 °F (36.6 °C). His blood pressure is 144/80 (abnormal) and his pulse is 92. His respiration is 18 and oxygen saturation is 98%.     Chief Complaint: Shoulder Pain    Patient is a 64 yo male with a history of arthritis who presents for evaluation of right shoulder/neck pain. Onset 5 days. He states he awoke with a soreness to his right shoulder/neck muscles. No injury/trauma reported. He denies fever, weakness, dyspnea, wheezing, swelling, erythema, bruising, wounds, rash, altered sensation. No other concerns were voiced.     Shoulder Pain   The pain is present in the right shoulder. This is a new problem. The current episode started in the past 7 days (5). The problem has been gradually worsening. The quality of the pain is described as aching. The pain is at a severity of 10/10. Associated symptoms include stiffness and tingling. Pertinent negatives include no fever, limited range of motion or numbness. He has tried acetaminophen for the symptoms. The treatment provided moderate relief.       Constitution: Negative for fever.   Respiratory:  Negative for shortness of breath.    Gastrointestinal:  Negative for vomiting.   Musculoskeletal:  Positive for joint pain, arthritis and muscle ache. Negative for joint swelling and abnormal ROM of joint.   Skin:  Negative for rash, wound, erythema and bruising.   Neurological:  Negative for numbness and tingling.      Objective:     Physical Exam   Constitutional: He is oriented to person, place, and time. He appears well-developed. He is cooperative.   HENT:   Head: Normocephalic and atraumatic.   Ears:   Right Ear: Hearing and external ear normal.   Left Ear: Hearing and external ear normal.   Nose: Nose normal. No mucosal edema or nasal deformity. No epistaxis. Right sinus exhibits no " maxillary sinus tenderness and no frontal sinus tenderness. Left sinus exhibits no maxillary sinus tenderness and no frontal sinus tenderness.   Mouth/Throat: Uvula is midline, oropharynx is clear and moist and mucous membranes are normal. No trismus in the jaw. Normal dentition. No uvula swelling.   Eyes: Conjunctivae and lids are normal.   Neck: Trachea normal and phonation normal. Neck supple.   Cardiovascular: Normal rate, regular rhythm, normal heart sounds and normal pulses.   Pulmonary/Chest: Effort normal and breath sounds normal. No respiratory distress. He has no wheezes. He has no rhonchi. He has no rales.   Abdominal: Normal appearance and bowel sounds are normal. Soft.   Neurological: He is alert and oriented to person, place, and time. He exhibits normal muscle tone.   Skin: Skin is warm, dry and intact. Capillary refill takes less than 2 seconds. No erythema   Psychiatric: His speech is normal and behavior is normal. Judgment and thought content normal.   Nursing note and vitals reviewed.      Assessment:     1. Right shoulder pain, unspecified chronicity    2. Muscle ache    3. Elevated BP without diagnosis of hypertension        Plan:       Right shoulder pain, unspecified chronicity    Muscle ache    Elevated BP without diagnosis of hypertension    Other orders  -     ketorolac injection 30 mg  -     ibuprofen (ADVIL,MOTRIN) 600 MG tablet; Take 1 tablet (600 mg total) by mouth 3 (three) times daily. for 7 days  Dispense: 21 tablet; Refill: 0  -     methocarbamoL (ROBAXIN) 500 MG Tab; Take 1 tablet (500 mg total) by mouth 4 (four) times daily. for 10 days  Dispense: 40 tablet; Refill: 0          Medical Decision Making:   Initial Assessment:   Nontoxic appearing 64 yo female c/o right shoulder pain.  Patient presents with joint pain of the right shoulder . This was not the result of a traumatic injury. Based upon the history and physical examination my clinical impression is muscle pain I have low  suspicion for fracture, dislocation, significant ligamentous injury, septic arthritis, gout flare, new autoimmune arthropathy, or gonococcal arthropathy.. Recommend supportive care. RICE. Provided RX for methocarbamol and mobic.  Patient to treat pain with Tylenol/ibuprofen. Patient to follow up with PCP/ORTHO as needed. Provided with return instructions and ER precautions.    Patient is seen in clinic with no history of essential hypertension noted to be elevated today in clinic.  Patient was counseled that blood pressure was elevated. I advised adherence to a low-salt heart smart diet, exercise and self-monitoring.  Patient follow up with primary physician for further evaluation and possible management if hypertension persists.         Patient Instructions   You have a sprain/strain.    To treat your pain:  600mg ibuprofen every 6 hours or tylenol 650mg every 6 hours as needed for pain. If needed, you can alternate these medications so that you take one medication every 3 hours. For instance, at noon take ibuprofen, then at 3pm take tylenol, then at 6pm take ibuprofen.    You may have been prescribed methocarbamol this is a muscle relaxant. This medication can cause drowsiness. Do not drive or operate heavy equipment when taking this medication.     Please arrange follow up with your primary medical clinic as soon as possible. You must understand that you've received an Urgent Care treatment only and that you may be released before all of your medical problems are known or treated. You, the patient, will arrange for follow up as instructed. If your symptoms worsen or fail to improve you should go to the Emergency Room.    WE CANNOT RULE OUT ALL POSSIBLE CAUSES OF YOUR SYMPTOMS IN THE URGENT CARE SETTING PLEASE GO TO THE ER IF YOU FEELS YOUR CONDITION IS WORSENING OR YOU WOULD LIKE EMERGENT EVALUATION.

## 2024-06-18 NOTE — LETTER
June 18, 2024      [facility]  [address], [address]  [facility] [address]  Phone: [phone]  Fax: [phone]       Patient: Bradley Petty   YOB: 1960  Date of Visit: 06/18/2024    To Whom It May Concern:    Melonie Petty  was at [facility] on 06/18/2024. The patient may return to work/school on 06/20/24 with no restrictions. If you have any questions or concerns, or if I can be of further assistance, please do not hesitate to contact me.    Sincerely,      Anu Walter, NP

## 2024-06-18 NOTE — PATIENT INSTRUCTIONS
You have a sprain/strain.    To treat your pain:  600mg ibuprofen every 6 hours or tylenol 650mg every 6 hours as needed for pain. If needed, you can alternate these medications so that you take one medication every 3 hours. For instance, at noon take ibuprofen, then at 3pm take tylenol, then at 6pm take ibuprofen.    You may have been prescribed methocarbamol this is a muscle relaxant. This medication can cause drowsiness. Do not drive or operate heavy equipment when taking this medication.     Please arrange follow up with your primary medical clinic as soon as possible. You must understand that you've received an Urgent Care treatment only and that you may be released before all of your medical problems are known or treated. You, the patient, will arrange for follow up as instructed. If your symptoms worsen or fail to improve you should go to the Emergency Room.    WE CANNOT RULE OUT ALL POSSIBLE CAUSES OF YOUR SYMPTOMS IN THE URGENT CARE SETTING PLEASE GO TO THE ER IF YOU FEELS YOUR CONDITION IS WORSENING OR YOU WOULD LIKE EMERGENT EVALUATION.

## 2024-07-02 ENCOUNTER — TELEPHONE (OUTPATIENT)
Dept: PULMONOLOGY | Facility: CLINIC | Age: 64
End: 2024-07-02
Payer: COMMERCIAL

## 2024-07-02 NOTE — TELEPHONE ENCOUNTER
Called pt to schedule pulmonology referral. Pt answered, but said he was in a meeting and will call back to schedule. Pt was thankful. Call ended well.

## 2024-07-08 PROBLEM — Z00.00 ROUTINE GENERAL MEDICAL EXAMINATION AT A HEALTH CARE FACILITY: Status: RESOLVED | Noted: 2017-07-06 | Resolved: 2024-07-08

## 2024-07-09 ENCOUNTER — TELEPHONE (OUTPATIENT)
Dept: INFECTIOUS DISEASES | Facility: CLINIC | Age: 64
End: 2024-07-09
Payer: COMMERCIAL

## 2024-07-10 ENCOUNTER — OFFICE VISIT (OUTPATIENT)
Dept: INFECTIOUS DISEASES | Facility: CLINIC | Age: 64
End: 2024-07-10
Payer: COMMERCIAL

## 2024-07-10 ENCOUNTER — LAB VISIT (OUTPATIENT)
Dept: LAB | Facility: HOSPITAL | Age: 64
End: 2024-07-10
Attending: INTERNAL MEDICINE
Payer: COMMERCIAL

## 2024-07-10 DIAGNOSIS — Z79.899 ON PRE-EXPOSURE PROPHYLAXIS FOR HIV: ICD-10-CM

## 2024-07-10 DIAGNOSIS — Z79.899 ON PRE-EXPOSURE PROPHYLAXIS FOR HIV: Primary | ICD-10-CM

## 2024-07-10 DIAGNOSIS — G47.33 OSA ON CPAP: ICD-10-CM

## 2024-07-10 LAB — HIV 1+2 AB+HIV1 P24 AG SERPL QL IA: NORMAL

## 2024-07-10 PROCEDURE — 3044F HG A1C LEVEL LT 7.0%: CPT | Mod: CPTII,95,, | Performed by: INTERNAL MEDICINE

## 2024-07-10 PROCEDURE — 36415 COLL VENOUS BLD VENIPUNCTURE: CPT | Performed by: INTERNAL MEDICINE

## 2024-07-10 PROCEDURE — 87389 HIV-1 AG W/HIV-1&-2 AB AG IA: CPT | Performed by: INTERNAL MEDICINE

## 2024-07-10 PROCEDURE — 99214 OFFICE O/P EST MOD 30 MIN: CPT | Mod: 95,,, | Performed by: INTERNAL MEDICINE

## 2024-07-10 NOTE — PROGRESS NOTES
Subjective     This is a virtual visit -    Location -l   Patient ID: Bradley Petty Jr. is a 63 y.o. male.    Chief Complaint: Follow up   HPI    63 year old man on Truvda for pREP.  He is bisexual and now with a female partner.  omponent Ref Range & Units 7 d ago   HIV-1 ULTRAQUANT <20 Copies/mL Not Detected   HIV-1 RNA, QUALITATIVE Not Detected Not Detected      Component      Latest Ref Rng 5/13/2019 2/19/2020 2/9/2022 11/23/2022 1/3/2024   Creatinine      0.5 - 1.4 mg/dL 1.5 (H)  1.39 (H)  1.4  1.6 (H)  1.5 (H)         07/10- he was seen for follow up.  He is on Truvada-reports compliance with therapy   Labs were not done prior to this time.       Review of Systems   Constitutional:  Negative for activity change, appetite change, chills, diaphoresis and fever.   Musculoskeletal:  Negative for arthralgias and back pain.   Neurological:  Negative for coordination difficulties.   Hematological:  Negative for adenopathy. Does not bruise/bleed easily.        Objective     Physical Exam  Musculoskeletal:         General: Normal range of motion.      Cervical back: Normal range of motion.   Neurological:      General: No focal deficit present.      Mental Status: He is alert and oriented to person, place, and time.        Assessment and Plan     Problem List Items Addressed This Visit       DA on CPAP     CPAP as tolerated          On pre-exposure prophylaxis for HIV - Primary     Will continue Truvada - will monitor serum creatinine ,needs follow up     Will do HIV labs ASAP         Relevant Orders    HIV 1/2 Ag/Ab (4th Gen)

## 2024-07-20 DIAGNOSIS — Z79.899 ON PRE-EXPOSURE PROPHYLAXIS FOR HIV: ICD-10-CM

## 2024-07-22 DIAGNOSIS — Z00.00 ROUTINE GENERAL MEDICAL EXAMINATION AT A HEALTH CARE FACILITY: ICD-10-CM

## 2024-07-22 RX ORDER — EMTRICITABINE AND TENOFOVIR DISOPROXIL FUMARATE 200; 300 MG/1; MG/1
1 TABLET, FILM COATED ORAL
Qty: 90 TABLET | Refills: 0 | Status: SHIPPED | OUTPATIENT
Start: 2024-07-22

## 2024-07-22 RX ORDER — TRAZODONE HYDROCHLORIDE 50 MG/1
TABLET ORAL
Qty: 180 TABLET | Refills: 2 | Status: SHIPPED | OUTPATIENT
Start: 2024-07-22

## 2024-07-22 NOTE — TELEPHONE ENCOUNTER
No care due was identified.  [facility] Embedded Care Due Messages. Reference number: [id number].   7/22/2024 9:14:04 AM CDT

## 2024-07-23 NOTE — TELEPHONE ENCOUNTER
Refill Decision Note   Kytere Jovanny  is requesting a refill authorization.  Brief Assessment and Rationale for Refill:  Approve     Medication Therapy Plan:         Comments:     Note composed:9:04 PM 07/22/2024

## 2024-10-24 DIAGNOSIS — R35.1 NOCTURIA: ICD-10-CM

## 2024-10-24 DIAGNOSIS — R35.0 FREQUENCY OF MICTURITION: ICD-10-CM

## 2024-10-24 DIAGNOSIS — Z79.899 ON PRE-EXPOSURE PROPHYLAXIS FOR HIV: ICD-10-CM

## 2024-10-24 RX ORDER — TAMSULOSIN HYDROCHLORIDE 0.4 MG/1
0.8 CAPSULE ORAL DAILY
Qty: 180 CAPSULE | Refills: 1 | Status: SHIPPED | OUTPATIENT
Start: 2024-10-24

## 2024-10-24 RX ORDER — EMTRICITABINE AND TENOFOVIR DISOPROXIL FUMARATE 200; 300 MG/1; MG/1
1 TABLET, FILM COATED ORAL DAILY
Qty: 90 TABLET | Refills: 0 | Status: SHIPPED | OUTPATIENT
Start: 2024-10-24

## 2024-10-24 NOTE — TELEPHONE ENCOUNTER
Refill Decision Note   Kytere Jovanny  is requesting a refill authorization.  Brief Assessment and Rationale for Refill:  Approve     Medication Therapy Plan:        Comments:     Note composed:8:09 AM 10/24/2024

## 2024-10-24 NOTE — TELEPHONE ENCOUNTER
No care due was identified.  [facility] Embedded Care Due Messages. Reference number: [id number].   10/24/2024 7:16:12 AM CDT

## 2024-10-25 DIAGNOSIS — Z79.899 ON PRE-EXPOSURE PROPHYLAXIS FOR HIV: Primary | ICD-10-CM

## 2024-10-28 ENCOUNTER — TELEPHONE (OUTPATIENT)
Dept: INFECTIOUS DISEASES | Facility: CLINIC | Age: 64
End: 2024-10-28
Payer: COMMERCIAL

## 2024-10-28 RX ORDER — SOLIFENACIN SUCCINATE 10 MG/1
10 TABLET, FILM COATED ORAL
Qty: 60 TABLET | Refills: 0 | Status: SHIPPED | OUTPATIENT
Start: 2024-10-28

## 2024-10-29 ENCOUNTER — PATIENT MESSAGE (OUTPATIENT)
Dept: UROLOGY | Facility: CLINIC | Age: 64
End: 2024-10-29
Payer: COMMERCIAL

## 2024-10-30 ENCOUNTER — OFFICE VISIT (OUTPATIENT)
Dept: INTERNAL MEDICINE | Facility: CLINIC | Age: 64
End: 2024-10-30
Payer: COMMERCIAL

## 2024-10-30 ENCOUNTER — LAB VISIT (OUTPATIENT)
Dept: LAB | Facility: HOSPITAL | Age: 64
End: 2024-10-30
Attending: INTERNAL MEDICINE
Payer: COMMERCIAL

## 2024-10-30 DIAGNOSIS — Z79.899 ON PRE-EXPOSURE PROPHYLAXIS FOR HIV: ICD-10-CM

## 2024-10-30 DIAGNOSIS — Z23 NEED FOR VACCINATION: Primary | ICD-10-CM

## 2024-10-30 LAB — HIV 1+2 AB+HIV1 P24 AG SERPL QL IA: NORMAL

## 2024-10-30 PROCEDURE — 90471 IMMUNIZATION ADMIN: CPT | Mod: S$GLB,,, | Performed by: INTERNAL MEDICINE

## 2024-10-30 PROCEDURE — 36415 COLL VENOUS BLD VENIPUNCTURE: CPT | Performed by: INTERNAL MEDICINE

## 2024-10-30 PROCEDURE — 90656 IIV3 VACC NO PRSV 0.5 ML IM: CPT | Mod: S$GLB,,, | Performed by: INTERNAL MEDICINE

## 2024-10-30 PROCEDURE — 87389 HIV-1 AG W/HIV-1&-2 AB AG IA: CPT | Performed by: INTERNAL MEDICINE

## 2024-10-30 PROCEDURE — 99999 PR PBB SHADOW E&M-EST. PATIENT-LVL II: CPT | Mod: PBBFAC,,, | Performed by: FAMILY MEDICINE

## 2024-11-11 ENCOUNTER — OFFICE VISIT (OUTPATIENT)
Dept: INFECTIOUS DISEASES | Facility: CLINIC | Age: 64
End: 2024-11-11
Payer: COMMERCIAL

## 2024-11-11 DIAGNOSIS — Z79.899 ON PRE-EXPOSURE PROPHYLAXIS FOR HIV: Primary | ICD-10-CM

## 2024-11-11 DIAGNOSIS — G47.33 OSA ON CPAP: ICD-10-CM

## 2024-11-11 PROCEDURE — 3044F HG A1C LEVEL LT 7.0%: CPT | Mod: CPTII,95,, | Performed by: INTERNAL MEDICINE

## 2024-11-11 PROCEDURE — 99214 OFFICE O/P EST MOD 30 MIN: CPT | Mod: 95,,, | Performed by: INTERNAL MEDICINE

## 2024-11-11 PROCEDURE — 1159F MED LIST DOCD IN RCRD: CPT | Mod: CPTII,95,, | Performed by: INTERNAL MEDICINE

## 2024-11-11 NOTE — ASSESSMENT & PLAN NOTE
Will continue Truvada -   Will order CMP,CBC  He also wants to do syphillis and GC testing .  Will order

## 2024-11-11 NOTE — PROGRESS NOTES
Subjective:      Patient ID: Bradley Petty Jr. is a 63 y.o. male.    This is a virtual visit  Location - [place]     Chief Complaint:Follow-up (On pre-exposure prophylaxis for HIV)      History of Present Illness -63 year old man well known to me.  He comes for follow up .  He is on Truvada.  Last HIV test done-10/30- negative  He wants to do testing for all STDS  HPI     Follow-up     Additional comments: On pre-exposure prophylaxis for HIV          Last edited by Akila David LPN on 11/11/2024  8:27 AM.      Review of Systems   Constitutional: Negative for chills, decreased appetite, diaphoresis and fever.     Objective:   Physical Exam  Vitals reviewed.   Eyes:      Pupils: Pupils are equal, round, and reactive to light.   Cardiovascular:      Rate and Rhythm: Normal rate.   Musculoskeletal:         General: Normal range of motion.      Cervical back: Normal range of motion.   Neurological:      Mental Status: He is alert.       Assessment:       1. On pre-exposure prophylaxis for HIV          Plan:       Problem List Items Addressed This Visit       DA on CPAP     CPAP as tolerated          On pre-exposure prophylaxis for HIV - Primary     Will continue Truvada -   Will order CMP,CBC  He also wants to do syphillis and GC testing .  Will order          Relevant Orders    C. trachomatis/N. gonorrhoeae by AMP DNA [facility]; Urine    Treponema Pallidium Antibodies IgG, IgM    Urinalysis, Reflex to Urine Culture Urine, Clean Catch    Comprehensive Metabolic Panel    CBC Auto Differential

## 2024-11-13 ENCOUNTER — LAB VISIT (OUTPATIENT)
Dept: LAB | Facility: HOSPITAL | Age: 64
End: 2024-11-13
Attending: INTERNAL MEDICINE
Payer: COMMERCIAL

## 2024-11-13 DIAGNOSIS — Z79.899 ON PRE-EXPOSURE PROPHYLAXIS FOR HIV: ICD-10-CM

## 2024-11-13 LAB
ALBUMIN SERPL BCP-MCNC: 3.9 G/DL (ref 3.5–5.2)
ALP SERPL-CCNC: 71 U/L (ref 40–150)
ALT SERPL W/O P-5'-P-CCNC: 22 U/L (ref 10–44)
ANION GAP SERPL CALC-SCNC: 8 MMOL/L (ref 8–16)
AST SERPL-CCNC: 26 U/L (ref 10–40)
BASOPHILS # BLD AUTO: 0.02 K/UL (ref 0–0.2)
BASOPHILS NFR BLD: 0.5 % (ref 0–1.9)
BILIRUB SERPL-MCNC: 0.6 MG/DL (ref 0.1–1)
BUN SERPL-MCNC: 12 MG/DL (ref 8–23)
CALCIUM SERPL-MCNC: 9.1 MG/DL (ref 8.7–10.5)
CHLORIDE SERPL-SCNC: 105 MMOL/L (ref 95–110)
CO2 SERPL-SCNC: 25 MMOL/L (ref 23–29)
CREAT SERPL-MCNC: 1.3 MG/DL (ref 0.5–1.4)
DIFFERENTIAL METHOD BLD: ABNORMAL
EOSINOPHIL # BLD AUTO: 0.1 K/UL (ref 0–0.5)
EOSINOPHIL NFR BLD: 1.4 % (ref 0–8)
ERYTHROCYTE [DISTWIDTH] IN BLOOD BY AUTOMATED COUNT: 12.1 % (ref 11.5–14.5)
EST. GFR  (NO RACE VARIABLE): >60 ML/MIN/1.73 M^2
GLUCOSE SERPL-MCNC: 87 MG/DL (ref 70–110)
HCT VFR BLD AUTO: 43.8 % (ref 40–54)
HGB BLD-MCNC: 14 G/DL (ref 14–18)
IMM GRANULOCYTES # BLD AUTO: 0.01 K/UL (ref 0–0.04)
IMM GRANULOCYTES NFR BLD AUTO: 0.2 % (ref 0–0.5)
LYMPHOCYTES # BLD AUTO: 2.2 K/UL (ref 1–4.8)
LYMPHOCYTES NFR BLD: 51.4 % (ref 18–48)
MCH RBC QN AUTO: 31.3 PG (ref 27–31)
MCHC RBC AUTO-ENTMCNC: 32 G/DL (ref 32–36)
MCV RBC AUTO: 98 FL (ref 82–98)
MONOCYTES # BLD AUTO: 0.3 K/UL (ref 0.3–1)
MONOCYTES NFR BLD: 7.1 % (ref 4–15)
NEUTROPHILS # BLD AUTO: 1.7 K/UL (ref 1.8–7.7)
NEUTROPHILS NFR BLD: 39.4 % (ref 38–73)
NRBC BLD-RTO: 0 /100 WBC
PLATELET # BLD AUTO: 235 K/UL (ref 150–450)
PMV BLD AUTO: 9.8 FL (ref 9.2–12.9)
POTASSIUM SERPL-SCNC: 4.1 MMOL/L (ref 3.5–5.1)
PROT SERPL-MCNC: 7.9 G/DL (ref 6–8.4)
RBC # BLD AUTO: 4.47 M/UL (ref 4.6–6.2)
SODIUM SERPL-SCNC: 138 MMOL/L (ref 136–145)
TREPONEMA PALLIDUM IGG+IGM AB [PRESENCE] IN SERUM OR PLASMA BY IMMUNOASSAY: NONREACTIVE
WBC # BLD AUTO: 4.2 K/UL (ref 3.9–12.7)

## 2024-11-13 PROCEDURE — 36415 COLL VENOUS BLD VENIPUNCTURE: CPT | Performed by: INTERNAL MEDICINE

## 2024-11-13 PROCEDURE — 86593 SYPHILIS TEST NON-TREP QUANT: CPT | Performed by: INTERNAL MEDICINE

## 2024-11-13 PROCEDURE — 80053 COMPREHEN METABOLIC PANEL: CPT | Performed by: INTERNAL MEDICINE

## 2024-11-13 PROCEDURE — 85025 COMPLETE CBC W/AUTO DIFF WBC: CPT | Performed by: INTERNAL MEDICINE

## 2024-11-15 DIAGNOSIS — Z79.899 ON PRE-EXPOSURE PROPHYLAXIS FOR HIV: Primary | ICD-10-CM

## 2024-11-20 ENCOUNTER — LAB VISIT (OUTPATIENT)
Dept: LAB | Facility: HOSPITAL | Age: 64
End: 2024-11-20
Attending: INTERNAL MEDICINE
Payer: COMMERCIAL

## 2024-11-20 DIAGNOSIS — Z79.899 ON PRE-EXPOSURE PROPHYLAXIS FOR HIV: ICD-10-CM

## 2024-11-20 LAB
BILIRUB UR QL STRIP: NEGATIVE
CLARITY UR: CLEAR
COLOR UR: NORMAL
GLUCOSE UR QL STRIP: NEGATIVE
HGB UR QL STRIP: NEGATIVE
KETONES UR QL STRIP: NEGATIVE
LEUKOCYTE ESTERASE UR QL STRIP: NEGATIVE
NITRITE UR QL STRIP: NEGATIVE
PH UR STRIP: 7 [PH] (ref 5–8)
PROT UR QL STRIP: NEGATIVE
SP GR UR STRIP: 1.01 (ref 1–1.03)
URN SPEC COLLECT METH UR: NORMAL

## 2024-11-20 PROCEDURE — 87491 CHLMYD TRACH DNA AMP PROBE: CPT | Performed by: INTERNAL MEDICINE

## 2024-11-20 PROCEDURE — 81003 URINALYSIS AUTO W/O SCOPE: CPT | Performed by: INTERNAL MEDICINE

## 2024-11-25 LAB
C TRACH DNA SPEC QL NAA+PROBE: NOT DETECTED
N GONORRHOEA DNA SPEC QL NAA+PROBE: NOT DETECTED

## 2024-11-30 PROBLEM — Z97.8 ENDOTRACHEALLY INTUBATED: Status: ACTIVE | Noted: 2024-11-30

## 2024-11-30 PROBLEM — M62.82 RHABDOMYOLYSIS: Status: ACTIVE | Noted: 2024-11-30

## 2024-11-30 PROBLEM — I61.9 ICH (INTRACEREBRAL HEMORRHAGE): Status: ACTIVE | Noted: 2024-11-30

## 2024-11-30 PROBLEM — Z78.9 IMPAIRED MOBILITY AND ADLS: Status: ACTIVE | Noted: 2024-11-30

## 2024-11-30 PROBLEM — E11.9 TYPE 2 DIABETES MELLITUS: Status: ACTIVE | Noted: 2024-11-30

## 2024-11-30 PROBLEM — Z74.09 IMPAIRED MOBILITY AND ADLS: Status: ACTIVE | Noted: 2024-11-30

## 2024-12-05 LAB
OHS QRS DURATION: 100 MS
OHS QRS DURATION: 100 MS
OHS QRS DURATION: 102 MS
OHS QTC CALCULATION: 477 MS
OHS QTC CALCULATION: 488 MS
OHS QTC CALCULATION: 521 MS

## 2025-01-02 ENCOUNTER — TELEPHONE (OUTPATIENT)
Dept: UROLOGY | Facility: CLINIC | Age: 65
End: 2025-01-02
Payer: COMMERCIAL

## 2025-01-02 PROBLEM — E11.9 TYPE 2 DIABETES MELLITUS: Status: RESOLVED | Noted: 2024-11-30 | Resolved: 2025-01-02

## 2025-01-02 PROBLEM — I61.9 ICH (INTRACEREBRAL HEMORRHAGE): Status: RESOLVED | Noted: 2024-11-30 | Resolved: 2025-01-02

## 2025-01-02 NOTE — TELEPHONE ENCOUNTER
Called patient letting him know that I could not get him in tomorrow because  is only here half of a day and he is booked out. Patient expressed understanding and stated that day would still work for him.      ----- Message from Darcy sent at 1/2/2025 10:53 AM CST -----  Contact: [phone]  [email or web address]     Patient is calling for Medical Advice regarding:appt on 01/07     How long has patient had these symptoms:asking if he can be seen tomorrow     Pharmacy name and phone#:    Patient wants a call back or [facility]:call back     Comments:  Pt is asking to be seen tomorrow instead of 01/07 he has an appt here joey orrow please advise   Please advise patient replies from provider may take up to 48 hours.

## 2025-01-03 ENCOUNTER — OFFICE VISIT (OUTPATIENT)
Dept: INTERNAL MEDICINE | Facility: CLINIC | Age: 65
End: 2025-01-03
Payer: COMMERCIAL

## 2025-01-03 VITALS
SYSTOLIC BLOOD PRESSURE: 146 MMHG | OXYGEN SATURATION: 96 % | HEIGHT: 70 IN | HEART RATE: 93 BPM | WEIGHT: 256.63 LBS | BODY MASS INDEX: 36.74 KG/M2 | TEMPERATURE: 97 F | DIASTOLIC BLOOD PRESSURE: 84 MMHG

## 2025-01-03 DIAGNOSIS — M35.00 SJOGREN'S SYNDROME, WITH UNSPECIFIED ORGAN INVOLVEMENT: ICD-10-CM

## 2025-01-03 DIAGNOSIS — M25.50 ARTHRALGIA, UNSPECIFIED JOINT: ICD-10-CM

## 2025-01-03 DIAGNOSIS — L72.0 EPIDERMOID CYST OF SKIN OF SCALP: Primary | ICD-10-CM

## 2025-01-03 PROCEDURE — 3077F SYST BP >= 140 MM HG: CPT | Mod: CPTII,S$GLB,, | Performed by: INTERNAL MEDICINE

## 2025-01-03 PROCEDURE — 1159F MED LIST DOCD IN RCRD: CPT | Mod: CPTII,S$GLB,, | Performed by: INTERNAL MEDICINE

## 2025-01-03 PROCEDURE — 99999 PR PBB SHADOW E&M-EST. PATIENT-LVL V: CPT | Mod: PBBFAC,,, | Performed by: INTERNAL MEDICINE

## 2025-01-03 PROCEDURE — 99214 OFFICE O/P EST MOD 30 MIN: CPT | Mod: S$GLB,,, | Performed by: INTERNAL MEDICINE

## 2025-01-03 PROCEDURE — 3008F BODY MASS INDEX DOCD: CPT | Mod: CPTII,S$GLB,, | Performed by: INTERNAL MEDICINE

## 2025-01-03 PROCEDURE — 3079F DIAST BP 80-89 MM HG: CPT | Mod: CPTII,S$GLB,, | Performed by: INTERNAL MEDICINE

## 2025-01-03 NOTE — PROGRESS NOTES
"Subjective:      Patient ID: Bradley Petty Jr. is a 64 y.o. male.    Chief Complaint: bump on head, joint pain    HPI    63 yo with   Patient Active Problem List   Diagnosis    Leukocytopenia, unspecified    Lumbago    Unspecified polyarthropathy or polyarthritis, site unspecified    Fibromyalgia    Depressed    Pain in left shoulder    Cervical radiculitis    Primary open-angle glaucoma(365.11)    Family history of glaucoma    Refractive error    DA on CPAP    Obesity (BMI 30-39.9)    Postinflammatory hyperpigmentation    Hypersomnolence    On pre-exposure prophylaxis for HIV    Decreased ROM of lumbar spine    Weakness of both lower extremities    Sacroiliitis    Bilateral lumbar radiculopathy     Past Medical History:   Diagnosis Date    Acid reflux     Arthralgia     CKD (chronic kidney disease)     Diabetes mellitus     Glaucoma     Hypertension     Sacroiliitis 08/29/2022    Sjogren's syndrome     Sleep apnea      C/o  Noticed bump to vertex of head starting in November. Non tender.   Haircut today and fowler expressed thick white/yellow material. No blood.     Also c/o joint pain for 6 to 7 months.   Mostly jemma elbows and knees. Some pain to superior mid foot jemma. No redness. No warmth. No swelling.  No trauma.   No pain/stiffness to hands, wrists, fingers, toes.     Review of Systems   Constitutional:  Negative for chills and fever.   HENT:  Negative for ear pain and sore throat.    Respiratory:  Negative for cough.    Cardiovascular:  Negative for chest pain.   Gastrointestinal:  Negative for abdominal pain and blood in stool.   Genitourinary:  Negative for dysuria and hematuria.   Neurological:  Negative for seizures and syncope.     Objective:   BP (!) 146/84 (BP Location: Right arm, Patient Position: Sitting)   Pulse 93   Temp 96.6 °F (35.9 °C)   Ht 5' 9.76" (1.772 m)   Wt 116.4 kg (256 lb 9.9 oz)   SpO2 96%   BMI 37.07 kg/m²     Physical Exam  Constitutional:       General: He is not in acute " distress.     Appearance: He is well-developed.   Cardiovascular:      Rate and Rhythm: Normal rate.   Pulmonary:      Effort: Pulmonary effort is normal.      Breath sounds: Normal breath sounds.   Musculoskeletal:         General: No swelling. Normal range of motion.   Skin:     General: Skin is warm and dry.   Neurological:      Gait: Gait normal.   Psychiatric:         Mood and Affect: Mood normal.         Behavior: Behavior normal.       Soft NT cystic structure to vertex of scalp. No redness nor warmth.         Lab Results   Component Value Date    WBC 4.20 11/13/2024    HGB 14.0 11/13/2024    HGB 14.3 04/05/2024    HGB 13.9 (L) 01/03/2024    HCT 43.8 11/13/2024    MCV 98 11/13/2024    MCV 99 (H) 04/05/2024    MCV 96 01/03/2024     11/13/2024    CHOL 196 04/05/2024    TRIG 151 (H) 04/05/2024    HDL 35 (L) 04/05/2024    LDLCALC 130.8 04/05/2024    LDLCALC 126.8 04/26/2022    LDLCALC 146.8 07/06/2017    ALT 22 11/13/2024    AST 26 11/13/2024     11/13/2024    K 4.1 11/13/2024    CALCIUM 9.1 11/13/2024     11/13/2024    CO2 25 11/13/2024    BUN 12 11/13/2024    CREATININE 1.3 11/13/2024    CREATININE 1.4 04/05/2024    CREATININE 1.5 (H) 01/03/2024    EGFRNORACEVR >60.0 11/13/2024    EGFRNORACEVR 56.8 (A) 04/05/2024    EGFRNORACEVR 52.3 (A) 01/03/2024    TSH 2.260 04/05/2024    TSH 1.536 04/26/2022    TSH 2.519 07/06/2017    PSA 5.7 (H) 04/05/2024    PSA 3.2 04/26/2022    PSA 2.1 07/06/2017    GLU 87 11/13/2024    HGBA1C 5.5 04/05/2024    HGBA1C 5.5 07/06/2017    HGBA1C 5.7 08/26/2015    [id number] 59 07/06/2017    TOTALTESTOST 493 04/05/2024    TOTALTESTOST 593 08/08/2014          The 10-year ASCVD risk score (Marshall FONTANA, et al., 2019) is: 12.7%    Values used to calculate the score:      Age: 64 years      Sex: Male      Is Non- : Yes      Diabetic: No      Tobacco smoker: No      Systolic Blood Pressure: 146 mmHg      Is BP treated: No      HDL Cholesterol: 35 mg/dL       Total Cholesterol: 196 mg/dL     Assessment:     1. Epidermoid cyst of skin of scalp    2. Arthralgia, unspecified joint    3. Sjogren's syndrome, with unspecified organ involvement      Plan:   1. Epidermoid cyst of skin of scalp  -     Ambulatory referral/consult to Dermatology; Future; Expected date: 01/10/2025    2. Arthralgia, unspecified joint  -     Ambulatory referral/consult to Rheumatology; Future; Expected date: 01/10/2025    3. Sjogren's syndrome, with unspecified organ involvement  -     Ambulatory referral/consult to Rheumatology; Future; Expected date: 01/10/2025        Patient Instructions   Tylenol 500 to 1000 mg every 8 hours as needed for pain.      Future Appointments   Date Time Provider Department Center   1/7/2025  7:45 AM Abdi Grijalva MD [facility] UROLOGY [facility]   3/10/2025  8:30 AM [facility]   3/25/2025  8:00 AM Barby Mccullough, PADERRICK [facility] DERM [facility]   4/4/2025  2:40 PM Jesse Barnes MD [facility] IM [place]       Lab Frequency Next Occurrence   Ambulatory referral/consult to Pulmonology Once 04/12/2024   HIV 1/2 Ag/Ab (4th Gen) Every 16 weeks 2/14/2025, 6/6/2025, 9/26/2025, 1/16/2026   HIV 1/2 Ag/Ab (4th Gen) Every 16 weeks 2/14/2025, 6/6/2025, 9/26/2025, 1/16/2026, 5/8/2026   Comprehensive Metabolic Panel Every 16 weeks 2/28/2025, 6/20/2025, 10/10/2025   CBC Auto Differential Every 16 weeks 2/28/2025, 6/20/2025, 10/10/2025       Follow up if symptoms worsen or fail to improve.              no

## 2025-01-07 ENCOUNTER — TELEPHONE (OUTPATIENT)
Dept: UROLOGY | Facility: CLINIC | Age: 65
End: 2025-01-07

## 2025-01-07 ENCOUNTER — OFFICE VISIT (OUTPATIENT)
Dept: UROLOGY | Facility: CLINIC | Age: 65
End: 2025-01-07
Payer: COMMERCIAL

## 2025-01-07 VITALS
BODY MASS INDEX: 36.61 KG/M2 | HEART RATE: 80 BPM | WEIGHT: 255.75 LBS | SYSTOLIC BLOOD PRESSURE: 138 MMHG | HEIGHT: 70 IN | DIASTOLIC BLOOD PRESSURE: 87 MMHG

## 2025-01-07 DIAGNOSIS — R35.0 FREQUENCY OF MICTURITION: Primary | ICD-10-CM

## 2025-01-07 LAB — POC RESIDUAL URINE VOLUME: 34 ML (ref 0–100)

## 2025-01-07 PROCEDURE — 3075F SYST BP GE 130 - 139MM HG: CPT | Mod: CPTII,S$GLB,, | Performed by: UROLOGY

## 2025-01-07 PROCEDURE — 99214 OFFICE O/P EST MOD 30 MIN: CPT | Mod: S$GLB,,, | Performed by: UROLOGY

## 2025-01-07 PROCEDURE — 3079F DIAST BP 80-89 MM HG: CPT | Mod: CPTII,S$GLB,, | Performed by: UROLOGY

## 2025-01-07 PROCEDURE — 1160F RVW MEDS BY RX/DR IN RCRD: CPT | Mod: CPTII,S$GLB,, | Performed by: UROLOGY

## 2025-01-07 PROCEDURE — 3008F BODY MASS INDEX DOCD: CPT | Mod: CPTII,S$GLB,, | Performed by: UROLOGY

## 2025-01-07 PROCEDURE — 99999 PR PBB SHADOW E&M-EST. PATIENT-LVL IV: CPT | Mod: PBBFAC,,, | Performed by: UROLOGY

## 2025-01-07 PROCEDURE — 1159F MED LIST DOCD IN RCRD: CPT | Mod: CPTII,S$GLB,, | Performed by: UROLOGY

## 2025-01-07 PROCEDURE — 51798 US URINE CAPACITY MEASURE: CPT | Mod: S$GLB,,, | Performed by: UROLOGY

## 2025-01-07 RX ORDER — MIRABEGRON 50 MG/1
50 TABLET, FILM COATED, EXTENDED RELEASE ORAL DAILY
Qty: 30 TABLET | Refills: 11 | Status: SHIPPED | OUTPATIENT
Start: 2025-01-07 | End: 2026-01-07

## 2025-01-07 RX ORDER — TOLTERODINE 4 MG/1
4 CAPSULE, EXTENDED RELEASE ORAL DAILY
Qty: 30 CAPSULE | Refills: 11 | Status: SHIPPED | OUTPATIENT
Start: 2025-01-07 | End: 2026-01-07

## 2025-01-07 NOTE — PROGRESS NOTES
Chief Complaint: OAB    HPI:   01/07/2025 - returns today for follow-up after an absence, has continued to take the VESIcare and Flomax, now taking two capsules of Flomax daily, now notes that his symptoms are worsened, now wearing a pull-up due to profound urgency and urge incontinence, no gross hematuria or dysuria, nocturia stable, has not yet gotten a new CPAP  IPSS - 5/4/4/5/4/1/5 = 28  QOL - 6(terrible)    11/29/2022 - returns for follow-up, increased dose of vesicare has improved his OAB but patient notes that it's 'not there yet', no SEs, nocturia stable    09/07/2022 - patient returns today for follow-up, is still taking the VESIcare, but notes that his urgency has returned, still waking up many times per night, states that he is now filling up the urinal, states that he filled out the voiding diary but forgot to bring it for review, denies any gross hematuria or dysuria, does not restrict fluids prior to bed and will sip on fluids when he wakes up at night, has not yet received his replacement CPAP    07/28/2022 - returns for follow-up, vesicare working but only for the daytime urgency and frequency, nocturia still very present, takes the med in the AM, stream also improved, notes +history of DA, was on a CPAP but Lacie had a recall and he has not been using one for sometime, though he does admit that his urinary issues were present prior, no GH or dysuria    06/09/2022 - returns today for follow-up, was started on Flomax by NP Rusjonathon at his last visit, patient states that initially it helped but he has had recurrence of symptoms, has urgency and infrequent urge incontinence, uses a paper towel his pants that he does not soak through his underwear, also notes a slow stream, as well as nocturia times 8-10, denies any gross hematuria, admits that he does not drink enough fluids, may have one cup of coffee in the morning and nothing else during the day until the evening time, he does not restrict fluids  prior to bed, denies gross hematuria or dysuria, concerned that he will have to start wearing diapers as he is going on a trip to [place] soon    Patient is a 61-year-old male that is presenting with a slow stream and nocturia.  Patient was seen by primary care provider and has a PSA lab schedule today.  Urine in clinic is negative.  PVR is low, 60 mL.  No BPH meds.   No abd/pelvic pain and no exac/rel factors.  No hematuria.  No urolithiasis.      Allergies:  Patient has no known allergies.    Medications:  has a current medication list which includes the following prescription(s): acetaminophen, b complex vitamins, cholecalciferol (vitamin d3), cyanocobalamin (vitamin b-12), emtricitabine-tenofovir 200-300 mg, herbal drugs, multivitamin, and trazodone.    Review of Systems:  General: No fever, chills  Skin: No rashes  Chest:  Denies cough and sputum production  Heart: Denies chest pain  Resp: Denies dyspnea  Abdomen: Denies diarrhea, abdominal pain, hematemesis, or blood in stool.  Musculoskeletal: No joint stiffness or swelling. Denies back pain.  : see HPI  Neuro: no dizziness or weakness      PMH:   has a past medical history of Acid reflux, Arthralgia, CKD (chronic kidney disease), Diabetes mellitus, Glaucoma, Hypertension, Sacroiliitis (08/29/2022), Sjogren's syndrome, and Sleep apnea.    PSH:   has a past surgical history that includes Back surgery; Injection of joint (Right, 8/29/2022); Epidural steroid injection into cervical spine (N/A, 10/21/2022); and Selective injection of anesthetic agent around lumbar spinal nerve root by transforaminal approach (Bilateral, 12/30/2022).    FamHx: family history includes Cancer in his maternal grandmother and mother; Diabetes Mellitus in his maternal grandmother; Glaucoma in his maternal grandmother; Rheum arthritis in his maternal grandmother.    SocHx:  reports that he has never smoked. He has never used smokeless tobacco. He reports that he does not drink  alcohol and does not use drugs.      Physical Exam:  Vitals:    01/07/25 0811   BP: 138/87   Pulse: 80       General: awake, alert, cooperative  Head: NC/AT  Ears: external ears normal  Eyes: sclera normal  Lungs: normal inspiration, NAD  Heart: well-perfused  Ext: No c/c/e.  Neuro: grossly intact, AOx3   4/22: Test desc jemma, no abnormalities of epididymus. Penis  with normal penile and scrotal skin. Meatus normal. Normal rectal tone, no hemorrhoids. Prost 35 gm no nodules or masses appreciated. SV not palpable. Perineum and anus normal.    Labs/Studies:   Lab Results   Component Value Date    WBC 4.20 11/13/2024    HGB 14.0 11/13/2024    HCT 43.8 11/13/2024     11/13/2024     11/13/2024    K 4.1 11/13/2024     11/13/2024    CREATININE 1.3 11/13/2024    BUN 12 11/13/2024    CO2 25 11/13/2024    TSH 2.260 04/05/2024    PSA 5.7 (H) 04/05/2024    HGBA1C 5.5 04/05/2024    CHOL 196 04/05/2024    TRIG 151 (H) 04/05/2024    HDL 35 (L) 04/05/2024    ALT 22 11/13/2024    AST 26 11/13/2024     Impression/Plan:   Urgency - had a long discussion with the patient regarding his options, was he does not feel like the VESIcare is working and is unwilling to catheterize if Botox is not helpful, he wants to try different p.o. medication, switched to tolterodine 4 mg for anticholinergic and add mirabegron 50 mg, side effects reviewed, also discussed SNS    Weak stream - continue Flomax, drop back down to one capsule daily    Nocturia - limit fluids 2 hours prior to bed, recommend replacing his CPAP or discussing the inspire device with Dr. Leon    - f/u 4-6 weeks     Abdi Grijalva MD

## 2025-01-07 NOTE — TELEPHONE ENCOUNTER
Pt came to appt       ----- Message from Lisa sent at 1/7/2025  7:47 AM CST -----  Patient is running a few minutes late, but marya

## 2025-01-09 DIAGNOSIS — R35.0 FREQUENCY OF MICTURITION: ICD-10-CM

## 2025-01-13 RX ORDER — SOLIFENACIN SUCCINATE 10 MG/1
10 TABLET, FILM COATED ORAL
Qty: 30 TABLET | Refills: 11 | Status: SHIPPED | OUTPATIENT
Start: 2025-01-13

## 2025-01-22 DIAGNOSIS — Z79.899 ON PRE-EXPOSURE PROPHYLAXIS FOR HIV: ICD-10-CM

## 2025-01-27 ENCOUNTER — TELEPHONE (OUTPATIENT)
Dept: INFECTIOUS DISEASES | Facility: CLINIC | Age: 65
End: 2025-01-27
Payer: COMMERCIAL

## 2025-01-27 ENCOUNTER — TELEPHONE (OUTPATIENT)
Dept: UROLOGY | Facility: CLINIC | Age: 65
End: 2025-01-27
Payer: COMMERCIAL

## 2025-01-27 ENCOUNTER — PATIENT MESSAGE (OUTPATIENT)
Dept: UROLOGY | Facility: CLINIC | Age: 65
End: 2025-01-27
Payer: COMMERCIAL

## 2025-01-27 RX ORDER — EMTRICITABINE AND TENOFOVIR DISOPROXIL FUMARATE 200; 300 MG/1; MG/1
1 TABLET, FILM COATED ORAL
Qty: 30 TABLET | Refills: 0 | Status: SHIPPED | OUTPATIENT
Start: 2025-01-27

## 2025-01-27 NOTE — TELEPHONE ENCOUNTER
----- Message from Ginger sent at 1/27/2025  7:05 AM CST -----  Type:  RX Refill Request    Who Called: pt  Refill or New Rx:refill  RX Name and Strength:emtricitabine-tenofovir 200-300 mg (TRUVADA) 200-300 mg Tab90 wfgnob055/24/2024-NoSig - Route: Take 1 tablet by mouth once daily. - OralSent to pharmacy as: emtricitabine-tenofovir 200-300 mg (TRUVADA) 200-300 mg TabClass: NormalOrder: [id number]/Time Signed: 10/24/2024 15:48E-Prescribing Status: Receipt confirmed by pharmacy (10/24/2024  3:48 PM CDT)   Preferred Pharmacy with phone number:  [facility]/pharmacy #[id number] - [place] - [address]  [address]  [place] [address]  Phone: [phone] Fax: [phone]  Local or Mail Order:local  Ordering Provider:ruby  Would the patient rather a call back or a response via [facility]? call  Best Call Back Number:[phone]  Additional Information: requesting a refill asap as pharmacy has been trying to get an approval for a week and patient is completely out.

## 2025-01-27 NOTE — TELEPHONE ENCOUNTER
Advised pt per Dr. Marquez that labs are needed. Pt states that he is unable to take off from work to have labs drawn. Per Dr. Marquez 30 day supply sent with 0 refills. Pt scheduled on 2/5/25 for labs. Pt verbalized understanding.   fall less than 6 months

## 2025-01-27 NOTE — TELEPHONE ENCOUNTER
Returned pt call to clarify medication, there was no answer.   Looked through fax boxes, did not see anything from the pharmacy for pt    Message sent to Dr Grijalva to sent in medication       ----- Message from Ginger sent at 1/27/2025  7:08 AM CST -----  Type:  RX Refill Request    Who Called: pt  Refill or New Rx:refill  RX Name and Strength: Disp Refills Start End BILLIE  mirabegron (MYRBETRIQ) 50 mg Tb24 30 tablet 11 1/7/2025 1/7/2026 No  Sig - Route: Take 1 tablet (50 mg total) by mouth once daily. - Oral  Sent to pharmacy as: mirabegron (MYRBETRIQ) 50 mg Tb24  Class: Normal  Order: [id number]  Date/Time Signed: 1/7/2025 08:42      E-Prescribing Status: Receipt confirmed by pharmacy (1/7/2025  8:42 AM CST)  tolterodine (DETROL [place]) 4 MG 24 hr [id number]/7/20251/7/2026--Sig - Route: Take 1 capsule (4 mg total) by mouth once daily. - OralSent to pharmacy as: tolterodine (DETROL [place]) 4 MG 24 hr capsuleClass: NormalOrder: [id number]/Time Signed: 1/7/2025 08:42E-Prescribing Status: Receipt confirmed by pharmacy (1/7/2025  8:42 AM CST)   Preferred Pharmacy with phone number:  [facility]/pharmacy #[id number] - [place] - [address]  [address]  [place] [address]  Phone: [phone] Fax: [phone]  Local or Mail Order:local  Ordering Provider:Valentine  Would the patient rather a call back or a response via [facility]? call  Best Call Back Number:[phone]  Additional Information: patient requesting refill asap as pharmacy has been trying to get an approval for over a week now and patient is completely out of medication. Patient was taken off of one medication and is out of the other, please call patient as well.

## 2025-01-29 ENCOUNTER — LAB VISIT (OUTPATIENT)
Dept: LAB | Facility: HOSPITAL | Age: 65
End: 2025-01-29
Attending: INTERNAL MEDICINE
Payer: COMMERCIAL

## 2025-01-29 DIAGNOSIS — Z79.899 ON PRE-EXPOSURE PROPHYLAXIS FOR HIV: ICD-10-CM

## 2025-01-29 LAB
ALBUMIN SERPL BCP-MCNC: 4 G/DL (ref 3.5–5.2)
ALP SERPL-CCNC: 65 U/L (ref 40–150)
ALT SERPL W/O P-5'-P-CCNC: 18 U/L (ref 10–44)
ANION GAP SERPL CALC-SCNC: 8 MMOL/L (ref 8–16)
AST SERPL-CCNC: 25 U/L (ref 10–40)
BILIRUB SERPL-MCNC: 0.7 MG/DL (ref 0.1–1)
BUN SERPL-MCNC: 13 MG/DL (ref 8–23)
CALCIUM SERPL-MCNC: 9.4 MG/DL (ref 8.7–10.5)
CHLORIDE SERPL-SCNC: 101 MMOL/L (ref 95–110)
CO2 SERPL-SCNC: 26 MMOL/L (ref 23–29)
CREAT SERPL-MCNC: 1.5 MG/DL (ref 0.5–1.4)
EST. GFR  (NO RACE VARIABLE): 52 ML/MIN/1.73 M^2
GLUCOSE SERPL-MCNC: 83 MG/DL (ref 70–110)
HIV 1+2 AB+HIV1 P24 AG SERPL QL IA: NORMAL
POTASSIUM SERPL-SCNC: 4.5 MMOL/L (ref 3.5–5.1)
PROT SERPL-MCNC: 8.2 G/DL (ref 6–8.4)
SODIUM SERPL-SCNC: 135 MMOL/L (ref 136–145)

## 2025-01-29 PROCEDURE — 36415 COLL VENOUS BLD VENIPUNCTURE: CPT | Performed by: INTERNAL MEDICINE

## 2025-01-29 PROCEDURE — 80053 COMPREHEN METABOLIC PANEL: CPT | Performed by: INTERNAL MEDICINE

## 2025-01-29 PROCEDURE — 85025 COMPLETE CBC W/AUTO DIFF WBC: CPT | Performed by: INTERNAL MEDICINE

## 2025-01-29 PROCEDURE — 87389 HIV-1 AG W/HIV-1&-2 AB AG IA: CPT | Performed by: INTERNAL MEDICINE

## 2025-01-30 LAB
BASOPHILS # BLD AUTO: 0.03 K/UL (ref 0–0.2)
BASOPHILS NFR BLD: 0.7 % (ref 0–1.9)
DIFFERENTIAL METHOD BLD: ABNORMAL
EOSINOPHIL # BLD AUTO: 0.1 K/UL (ref 0–0.5)
EOSINOPHIL NFR BLD: 1.6 % (ref 0–8)
ERYTHROCYTE [DISTWIDTH] IN BLOOD BY AUTOMATED COUNT: 12.2 % (ref 11.5–14.5)
HCT VFR BLD AUTO: 44.1 % (ref 40–54)
HGB BLD-MCNC: 14.6 G/DL (ref 14–18)
IMM GRANULOCYTES # BLD AUTO: 0.01 K/UL (ref 0–0.04)
IMM GRANULOCYTES NFR BLD AUTO: 0.2 % (ref 0–0.5)
LYMPHOCYTES # BLD AUTO: 1.6 K/UL (ref 1–4.8)
LYMPHOCYTES NFR BLD: 35.2 % (ref 18–48)
MCH RBC QN AUTO: 31.9 PG (ref 27–31)
MCHC RBC AUTO-ENTMCNC: 33.1 G/DL (ref 32–36)
MCV RBC AUTO: 97 FL (ref 82–98)
MONOCYTES # BLD AUTO: 0.3 K/UL (ref 0.3–1)
MONOCYTES NFR BLD: 5.9 % (ref 4–15)
NEUTROPHILS # BLD AUTO: 2.5 K/UL (ref 1.8–7.7)
NEUTROPHILS NFR BLD: 56.4 % (ref 38–73)
NRBC BLD-RTO: 0 /100 WBC
PLATELET # BLD AUTO: 206 K/UL (ref 150–450)
PMV BLD AUTO: 10.3 FL (ref 9.2–12.9)
RBC # BLD AUTO: 4.57 M/UL (ref 4.6–6.2)
WBC # BLD AUTO: 4.43 K/UL (ref 3.9–12.7)

## 2025-02-19 DIAGNOSIS — Z79.899 ON PRE-EXPOSURE PROPHYLAXIS FOR HIV: ICD-10-CM

## 2025-02-19 DIAGNOSIS — Z00.00 ROUTINE GENERAL MEDICAL EXAMINATION AT A HEALTH CARE FACILITY: ICD-10-CM

## 2025-02-19 RX ORDER — TRAZODONE HYDROCHLORIDE 50 MG/1
TABLET ORAL
Qty: 180 TABLET | Refills: 3 | Status: SHIPPED | OUTPATIENT
Start: 2025-02-19

## 2025-02-19 NOTE — TELEPHONE ENCOUNTER
Refill Decision Note   Kytere Jovanny  is requesting a refill authorization.  Brief Assessment and Rationale for Refill:  Approve     Medication Therapy Plan:         Comments:     Note composed:2:49 PM 02/19/2025

## 2025-02-19 NOTE — TELEPHONE ENCOUNTER
No care due was identified.  [facility] Embedded Care Due Messages. Reference number: [id number].   2/19/2025 10:13:56 AM CST

## 2025-02-24 RX ORDER — EMTRICITABINE AND TENOFOVIR DISOPROXIL FUMARATE 200; 300 MG/1; MG/1
1 TABLET, FILM COATED ORAL
Qty: 30 TABLET | Refills: 4 | Status: SHIPPED | OUTPATIENT
Start: 2025-02-24

## 2025-03-10 ENCOUNTER — OFFICE VISIT (OUTPATIENT)
Dept: INFECTIOUS DISEASES | Facility: CLINIC | Age: 65
End: 2025-03-10
Payer: COMMERCIAL

## 2025-03-10 DIAGNOSIS — Z79.899 ON PRE-EXPOSURE PROPHYLAXIS FOR HIV: Primary | ICD-10-CM

## 2025-03-10 DIAGNOSIS — R79.89 ELEVATED SERUM CREATININE: ICD-10-CM

## 2025-03-10 NOTE — ASSESSMENT & PLAN NOTE
Will continue Truvada -   Will need to monitor serum creatinine closely  HIV labs- 01/29- negative

## 2025-03-10 NOTE — ASSESSMENT & PLAN NOTE
Will arrange for gentle hydration     Outpatient  Therapy Plan:     Please send referral to [facility]   1) Dehydration      2) Discharge Medications   Intravenous  meds  One liter of 0.9 NACL over one hour     3) Therapy Duration:  one day

## 2025-03-10 NOTE — PROGRESS NOTES
Subjective       This is a virtual visit  Location - [place]      Chief Complaint:Follow-up (On pre-exposure prophylaxis for HIV)       HPI  64 year old man well known to me.  He comes for follow up .  He is on Truvada.  01/29/24- not detected   Serum creatine-  Component      Latest Ref Rng 11/13/2024 1/29/2025   Creatinine      0.5 - 1.4 mg/dL 1.3  1.5 (H)       Legend:  (H) High        Review of Systems   Constitutional:  Negative for activity change, appetite change and chills.   Musculoskeletal:  Negative for arthralgias.   Neurological:  Negative for dizziness and coordination difficulties.   Psychiatric/Behavioral:  Negative for agitation.           Objective     Physical Exam  Vitals and nursing note reviewed.   HENT:      Head: Normocephalic.   Eyes:      Pupils: Pupils are equal, round, and reactive to light.   Cardiovascular:      Rate and Rhythm: Normal rate.   Neurological:      Mental Status: He is alert.            Assessment and Plan     1. On pre-exposure prophylaxis for HIV  Assessment & Plan:  Will continue Truvada -   Will need to monitor serum creatinine closely  HIV labs- 01/29- negative       2. Elevated serum creatinine  Assessment & Plan:  Will arrange for gentle hydration     Outpatient  Therapy Plan:     Please send referral to [facility]   1) Dehydration      2) Discharge Medications   Intravenous  meds  One liter of 0.9 NACL over one hour     3) Therapy Duration:  one day

## 2025-03-20 ENCOUNTER — PATIENT MESSAGE (OUTPATIENT)
Dept: DERMATOLOGY | Facility: CLINIC | Age: 65
End: 2025-03-20

## 2025-03-20 ENCOUNTER — OFFICE VISIT (OUTPATIENT)
Dept: DERMATOLOGY | Facility: CLINIC | Age: 65
End: 2025-03-20
Payer: COMMERCIAL

## 2025-03-20 DIAGNOSIS — L72.0 EPIDERMOID CYST OF SKIN OF SCALP: Primary | ICD-10-CM

## 2025-03-20 DIAGNOSIS — L81.9 DYSCHROMIA: ICD-10-CM

## 2025-03-20 DIAGNOSIS — R22.9 SUBCUTANEOUS NODULE: ICD-10-CM

## 2025-03-20 DIAGNOSIS — L91.8 ACROCHORDON: ICD-10-CM

## 2025-03-20 DIAGNOSIS — L73.1 PSEUDOFOLLICULITIS BARBAE: ICD-10-CM

## 2025-03-20 PROCEDURE — 1160F RVW MEDS BY RX/DR IN RCRD: CPT | Mod: CPTII,S$GLB,, | Performed by: DERMATOLOGY

## 2025-03-20 PROCEDURE — 99999 PR PBB SHADOW E&M-EST. PATIENT-LVL IV: CPT | Mod: PBBFAC,,, | Performed by: DERMATOLOGY

## 2025-03-20 PROCEDURE — 99203 OFFICE O/P NEW LOW 30 MIN: CPT | Mod: S$GLB,,, | Performed by: DERMATOLOGY

## 2025-03-20 PROCEDURE — 1159F MED LIST DOCD IN RCRD: CPT | Mod: CPTII,S$GLB,, | Performed by: DERMATOLOGY

## 2025-03-20 RX ORDER — HYDROQUINONE 40 MG/G
CREAM TOPICAL
Qty: 30 G | Refills: 1 | Status: SHIPPED | OUTPATIENT
Start: 2025-03-20

## 2025-03-20 NOTE — PROGRESS NOTES
Subjective:      Patient ID:  Bradley Petty Jr. is a 64 y.o. male who presents for     History of Present Illness: The patient presents with chief complaint of cyst.  Location: scalp   Duration: 7 months  Signs/Symptoms: intermittently swells; fowler drained something white out of the scalp; tender    Prior treatments:   None    Patient complains of lesion(s)  Location: R posterior neck/shoulder  Duration: 6-7 months  Symptoms: very tender, keeps him from sleeping at night, intermittently gets much larger; no drainage. He reports it was much larger when his PCP felt it  Relieving factors/Previous treatments: none        Also wants something to lighten pigmentation on his lower face. Previously used a compounded topical for darkening of face from Dr. Roche (got it through [employer]), worked really well, but has been 2 years since had some.     Also has skin tags on the neck he doesn't like    Also c/o strange smell when he rubs his fingers on his nose      Review of Systems   Skin:  Negative for itching and rash.       Objective:   Physical Exam   Constitutional: He appears well-developed and well-nourished. No distress.   Neurological: He is alert and oriented to person, place, and time. He is not disoriented.   Psychiatric: He has a normal mood and affect.   Skin:   Areas Examined (abnormalities noted in diagram):   Scalp / Hair Palpated and Inspected  Neck Inspection Performed            Diagram Legend     Erythematous scaling macule/papule c/w actinic keratosis       Vascular papule c/w angioma      Pigmented verrucoid papule/plaque c/w seborrheic keratosis      Yellow umbilicated papule c/w sebaceous hyperplasia      Irregularly shaped tan macule c/w lentigo     1-2 mm smooth white papules consistent with Milia      Movable subcutaneous cyst with punctum c/w epidermal inclusion cyst      Subcutaneous movable cyst c/w pilar cyst      Firm pink to brown papule c/w dermatofibroma      Pedunculated fleshy  papule(s) c/w skin tag(s)      Evenly pigmented macule c/w junctional nevus     Mildly variegated pigmented, slightly irregular-bordered macule c/w mildly atypical nevus      Flesh colored to evenly pigmented papule c/w intradermal nevus       Pink pearly papule/plaque c/w basal cell carcinoma      Erythematous hyperkeratotic cursted plaque c/w SCC      Surgical scar with no sign of skin cancer recurrence      Open and closed comedones      Inflammatory papules and pustules      Verrucoid papule consistent consistent with wart     Erythematous eczematous patches and plaques     Dystrophic onycholytic nail with subungual debris c/w onychomycosis     Umbilicated papule    Erythematous-base heme-crusted tan verrucoid plaque consistent with inflamed seborrheic keratosis     Erythematous Silvery Scaling Plaque c/w Psoriasis     See annotation            Assessment / Plan:          Subcutaneous nodules- scalp and R posterolateral neck  -     Ambulatory referral/consult to ENT; Future; Expected date: 03/27/2025    - scalp: suspect EIC vs less likely pilar cyst given the presence of 2 puncta. Discussed suspected etiology, likely benign. If he would like it excised, recommend excision with head/neck surgeon given size and location of lesion  - R posterolateral neck: difficult to appreciate on exam today.  He reports great pain from this area that keeps him from sleeping.  In the area that he indicates, there may be a subtle subcutaneous nodule but it is deep and ill defined, would suspect a lipoma rather than a cyst, and if he desires excision would also recommend discussing this with head/neck surgeon.      Acrochordon  Reassurance given to patient. No treatment is necessary.   Treatment of benign, asymptomatic lesions may be considered cosmetic.  Gave cos pricing list    Dyschromia  Pseudofolliculitis barbae  - suspect 2/2 PFB given beard/lower face distribution. We discussed hydroquinone and tretinoin and I will call and  speak with Abdi's pharmacy to see what he used previously, and message him with an update    - hydroquinone: we reviewed that this is to be used for no more than 2-3 months of continuous use, and then take at least one month off. Discussed risk of ochronosis with uninterrupted use.    - topical retinoid: Side effects reviewed to include but not limited to redness, dryness, flaking, burning/tingling sensation, skin irritation, and feeling of tightness.           ADDENDUM: Called and spoke with Jennifers and he previously used the compound below, which contains ingredients we discussed at his visit along with topical clindamycin which I agree may be helpful for him - sent refill as below:    -     Compound 6% hydroquinone, 0.01% tretinoin, 1% clindamycin in 50/50 cream/gel:  Apply to affected areas of face daily for up to 2 months per course, then take at least 1 month off  Dispense: 30 g; Refill: 1             Follow up if symptoms worsen or fail to improve.          LOS NUMBER AND COMPLEXITY OF PROBLEMS    COMPLEXITY OF DATA RISK TOTAL TIME (m)   47355  09302 [] 1 self-limited or minor problem [x] Minimal to none [] No treatment recommended or patient to monitor. Reassurance.  15-29  10-19   06740  36895 Low  [x] 2 or more self limited or minor problems  [] 1 stable chronic illness  [] 1 acute, uncomplicated illness or injury Limited (2)  [] Prior external notes from each unique source  [] Review result of each unique test  [] Order each unique test  OR [] Independent historian Low  []  OTC medications   []  Discussed/Decision for minor skin surgery (no risk factors) 30-44  20-29   17316  80448 Moderate  []  1 or more chronic unstable illness (not at goal or progression or exacerbation) or SE of treatment  []  2 or more stable chronic illnesses  []  1 acute illness with systemic symptoms  []  1 acute complicated injury  []  1 undiagnosed new problem with uncertain prognosis Moderate (1/3 below)  []  3 or more data  items        *Now includes independent historian  []  Independent interpretation of a test  []  Discuss management/test with another provider Moderate  [x]  Prescription drug mgmt  []  Discussed/Decision for Minor surgery with risk factors  []  Mgmt limited by social determinates 45-59  30-39   43379  15197 High  []  1 or more chronic illness with severe exacerbation, progression or SE of treatment  []  1 acute or chronic illness/injury that poses a threat to life or bodily function Extensive (2/3 below)  []  3 or more data items        *Now includes independent historian.  []  Independent interpretation of a test  []  Discuss management/test with another provider High  []  Major surgery with risk discussed  []  Drug therapy requiring intensive monitoring for toxicity  []  Hospitalization  []  Decision for DNR 60-74  40-54

## 2025-03-29 ENCOUNTER — OFFICE VISIT (OUTPATIENT)
Dept: URGENT CARE | Facility: CLINIC | Age: 65
End: 2025-03-29
Payer: COMMERCIAL

## 2025-03-29 ENCOUNTER — HOSPITAL ENCOUNTER (EMERGENCY)
Facility: HOSPITAL | Age: 65
Discharge: HOME | End: 2025-03-29
Attending: EMERGENCY MEDICINE
Payer: COMMERCIAL

## 2025-03-29 VITALS
OXYGEN SATURATION: 100 % | BODY MASS INDEX: 35.21 KG/M2 | RESPIRATION RATE: 20 BRPM | WEIGHT: 252.44 LBS | TEMPERATURE: 98 F | DIASTOLIC BLOOD PRESSURE: 89 MMHG | SYSTOLIC BLOOD PRESSURE: 177 MMHG | HEART RATE: 75 BPM

## 2025-03-29 VITALS
TEMPERATURE: 97 F | RESPIRATION RATE: 17 BRPM | OXYGEN SATURATION: 99 % | HEIGHT: 71 IN | WEIGHT: 250.75 LBS | DIASTOLIC BLOOD PRESSURE: 60 MMHG | HEART RATE: 89 BPM | SYSTOLIC BLOOD PRESSURE: 144 MMHG | BODY MASS INDEX: 35.1 KG/M2

## 2025-03-29 DIAGNOSIS — R53.1 WEAKNESS: ICD-10-CM

## 2025-03-29 DIAGNOSIS — R52 BODY ACHES: ICD-10-CM

## 2025-03-29 DIAGNOSIS — R42 ORTHOSTATIC LIGHTHEADEDNESS: Primary | ICD-10-CM

## 2025-03-29 DIAGNOSIS — R42 DIZZINESS: Primary | ICD-10-CM

## 2025-03-29 DIAGNOSIS — R50.9 SUBJECTIVE FEVER: ICD-10-CM

## 2025-03-29 DIAGNOSIS — R11.0 NAUSEA: ICD-10-CM

## 2025-03-29 DIAGNOSIS — R25.2 MUSCLE CRAMPS: ICD-10-CM

## 2025-03-29 LAB
ABSOLUTE EOSINOPHIL (OHS): 0.11 K/UL
ABSOLUTE MONOCYTE (OHS): 0.26 K/UL (ref 0.3–1)
ABSOLUTE NEUTROPHIL COUNT (OHS): 1.49 K/UL (ref 1.8–7.7)
ALBUMIN SERPL BCP-MCNC: 3.9 G/DL (ref 3.5–5.2)
ALP SERPL-CCNC: 79 UNIT/L (ref 40–150)
ALT SERPL W/O P-5'-P-CCNC: 27 UNIT/L (ref 10–44)
ANION GAP (OHS): 7 MMOL/L (ref 8–16)
AST SERPL-CCNC: 51 UNIT/L (ref 11–45)
BASOPHILS # BLD AUTO: 0.03 K/UL
BASOPHILS NFR BLD AUTO: 0.8 %
BILIRUB SERPL-MCNC: 0.4 MG/DL (ref 0.1–1)
BILIRUB UR QL STRIP.AUTO: NEGATIVE
BNP SERPL-MCNC: 15 PG/ML (ref 0–99)
BUN SERPL-MCNC: 14 MG/DL (ref 8–23)
CALCIUM SERPL-MCNC: 9.3 MG/DL (ref 8.7–10.5)
CHLORIDE SERPL-SCNC: 104 MMOL/L (ref 95–110)
CK SERPL-CCNC: 1130 U/L (ref 20–200)
CK SERPL-CCNC: 854 U/L (ref 20–200)
CLARITY UR: CLEAR
CO2 SERPL-SCNC: 25 MMOL/L (ref 23–29)
COLOR UR AUTO: YELLOW
CREAT SERPL-MCNC: 1.3 MG/DL (ref 0.5–1.4)
CTP QC/QA: YES
CTP QC/QA: YES
ERYTHROCYTE [DISTWIDTH] IN BLOOD BY AUTOMATED COUNT: 11.6 % (ref 11.5–14.5)
GFR SERPLBLD CREATININE-BSD FMLA CKD-EPI: >60 ML/MIN/1.73/M2
GLUCOSE SERPL-MCNC: 91 MG/DL (ref 70–110)
GLUCOSE SERPL-MCNC: 92 MG/DL (ref 70–110)
GLUCOSE UR QL STRIP: NEGATIVE
HCT VFR BLD AUTO: 42.3 % (ref 40–54)
HGB BLD-MCNC: 14.5 GM/DL (ref 14–18)
HGB UR QL STRIP: NEGATIVE
IMM GRANULOCYTES # BLD AUTO: 0.01 K/UL (ref 0–0.04)
IMM GRANULOCYTES NFR BLD AUTO: 0.3 % (ref 0–0.5)
INFLUENZA A MOLECULAR (OHS): NEGATIVE
INFLUENZA B MOLECULAR (OHS): NEGATIVE
KETONES UR QL STRIP: NEGATIVE
LEUKOCYTE ESTERASE UR QL STRIP: NEGATIVE
LYMPHOCYTES # BLD AUTO: 1.75 K/UL (ref 1–4.8)
MAGNESIUM SERPL-MCNC: 1.8 MG/DL (ref 1.6–2.6)
MCH RBC QN AUTO: 32.5 PG (ref 27–50)
MCHC RBC AUTO-ENTMCNC: 34.3 G/DL (ref 32–36)
MCV RBC AUTO: 95 FL (ref 82–98)
NITRITE UR QL STRIP: NEGATIVE
NUCLEATED RBC (/100WBC) (OHS): 0 /100 WBC
PH UR STRIP: 7 [PH]
PLATELET # BLD AUTO: 198 K/UL (ref 150–450)
PMV BLD AUTO: 8.8 FL (ref 9.2–12.9)
POC MOLECULAR INFLUENZA A AGN: NEGATIVE
POC MOLECULAR INFLUENZA B AGN: NEGATIVE
POTASSIUM SERPL-SCNC: 4.1 MMOL/L (ref 3.5–5.1)
PROT SERPL-MCNC: 8.1 GM/DL (ref 6–8.4)
PROT UR QL STRIP: ABNORMAL
RBC # BLD AUTO: 4.46 M/UL (ref 4.6–6.2)
RELATIVE EOSINOPHIL (OHS): 3 %
RELATIVE LYMPHOCYTE (OHS): 47.9 % (ref 18–48)
RELATIVE MONOCYTE (OHS): 7.1 % (ref 4–15)
RELATIVE NEUTROPHIL (OHS): 40.9 % (ref 38–73)
SARS CORONAVIRUS 2 ANTIGEN: NEGATIVE
SARS-COV-2 RDRP RESP QL NAA+PROBE: NEGATIVE
SODIUM SERPL-SCNC: 136 MMOL/L (ref 136–145)
SP GR UR STRIP: 1.02
TROPONIN I SERPL DL<=0.01 NG/ML-MCNC: <0.006 NG/ML
TROPONIN I SERPL DL<=0.01 NG/ML-MCNC: <0.006 NG/ML
UROBILINOGEN UR STRIP-ACNC: NEGATIVE EU/DL
WBC # BLD AUTO: 3.65 K/UL (ref 3.9–12.7)

## 2025-03-29 PROCEDURE — 25500020 PHARM REV CODE 255: Performed by: EMERGENCY MEDICINE

## 2025-03-29 PROCEDURE — 99285 EMERGENCY DEPT VISIT HI MDM: CPT | Mod: 25

## 2025-03-29 PROCEDURE — 83880 ASSAY OF NATRIURETIC PEPTIDE: CPT

## 2025-03-29 PROCEDURE — 93005 ELECTROCARDIOGRAM TRACING: CPT

## 2025-03-29 PROCEDURE — 80053 COMPREHEN METABOLIC PANEL: CPT

## 2025-03-29 PROCEDURE — 85025 COMPLETE CBC W/AUTO DIFF WBC: CPT

## 2025-03-29 PROCEDURE — 87811 SARS-COV-2 COVID19 W/OPTIC: CPT | Mod: QW,S$GLB,,

## 2025-03-29 PROCEDURE — 82550 ASSAY OF CK (CPK): CPT

## 2025-03-29 PROCEDURE — 25000003 PHARM REV CODE 250: Performed by: EMERGENCY MEDICINE

## 2025-03-29 PROCEDURE — 83735 ASSAY OF MAGNESIUM: CPT | Performed by: EMERGENCY MEDICINE

## 2025-03-29 PROCEDURE — 96360 HYDRATION IV INFUSION INIT: CPT

## 2025-03-29 PROCEDURE — 87502 INFLUENZA DNA AMP PROBE: CPT

## 2025-03-29 PROCEDURE — 96361 HYDRATE IV INFUSION ADD-ON: CPT

## 2025-03-29 PROCEDURE — 81003 URINALYSIS AUTO W/O SCOPE: CPT

## 2025-03-29 PROCEDURE — 82550 ASSAY OF CK (CPK): CPT | Performed by: EMERGENCY MEDICINE

## 2025-03-29 PROCEDURE — 93010 ELECTROCARDIOGRAM REPORT: CPT | Mod: 76,,, | Performed by: INTERNAL MEDICINE

## 2025-03-29 PROCEDURE — 99214 OFFICE O/P EST MOD 30 MIN: CPT | Mod: S$GLB,,,

## 2025-03-29 PROCEDURE — 82962 GLUCOSE BLOOD TEST: CPT | Mod: S$GLB,,,

## 2025-03-29 PROCEDURE — 84484 ASSAY OF TROPONIN QUANT: CPT

## 2025-03-29 PROCEDURE — 87502 INFLUENZA DNA AMP PROBE: CPT | Mod: QW,S$GLB,,

## 2025-03-29 PROCEDURE — 82962 GLUCOSE BLOOD TEST: CPT

## 2025-03-29 PROCEDURE — 63600175 PHARM REV CODE 636 W HCPCS: Performed by: EMERGENCY MEDICINE

## 2025-03-29 PROCEDURE — 84484 ASSAY OF TROPONIN QUANT: CPT | Performed by: EMERGENCY MEDICINE

## 2025-03-29 PROCEDURE — U0002 COVID-19 LAB TEST NON-CDC: HCPCS

## 2025-03-29 RX ORDER — ONDANSETRON 4 MG/1
4 TABLET, ORALLY DISINTEGRATING ORAL EVERY 8 HOURS PRN
Qty: 15 TABLET | Refills: 0 | Status: SHIPPED | OUTPATIENT
Start: 2025-03-29

## 2025-03-29 RX ORDER — MECLIZINE HYDROCHLORIDE 25 MG/1
25 TABLET ORAL 3 TIMES DAILY PRN
Qty: 18 TABLET | Refills: 0 | Status: SHIPPED | OUTPATIENT
Start: 2025-03-29

## 2025-03-29 RX ORDER — MECLIZINE HYDROCHLORIDE 25 MG/1
25 TABLET ORAL
Status: COMPLETED | OUTPATIENT
Start: 2025-03-29 | End: 2025-03-29

## 2025-03-29 RX ADMIN — IOHEXOL 100 ML: 350 INJECTION, SOLUTION INTRAVENOUS at 01:03

## 2025-03-29 RX ADMIN — CARBAMIDE PEROXIDE 5 DROP: 65 SOLUTION/ DROPS TOPICAL at 01:03

## 2025-03-29 RX ADMIN — MECLIZINE HYDROCHLORIDE 25 MG: 25 TABLET ORAL at 04:03

## 2025-03-29 RX ADMIN — SODIUM CHLORIDE, POTASSIUM CHLORIDE, SODIUM LACTATE AND CALCIUM CHLORIDE 1000 ML: 600; 310; 30; 20 INJECTION, SOLUTION INTRAVENOUS at 12:03

## 2025-03-29 NOTE — ED NOTES
Pt states dizziness has progressed over a week from where he was just dizzy when standing and now gets dizzy when either sitting or standing. Pt states he had fever Thursday but hasn't had it  since. Pt also states he has a bump on the top of his head but is unsure if it is related. Pt also reports having muscle cramps and aching all over body.

## 2025-03-29 NOTE — PROGRESS NOTES
"Subjective:      Patient ID: Bradley Petty Jr. is a 64 y.o. male.    Vitals:  height is 5' 11" (1.803 m) and weight is 113.7 kg (250 lb 12.4 oz). His temperature is 97.2 °F (36.2 °C). His blood pressure is 144/60 (abnormal) and his pulse is 89. His respiration is 17 and oxygen saturation is 99%.     Chief Complaint: Dizziness    65 yo male PT presents today with feeling weak, generalized body aches in joints, occasional muscle cramping, he states that he has been having fever but that went away (tmax of 100.4).  Took NSAID with resolution of fever. He states that this has been going on for a week now and that work has been extra busy. He states that as he is sitting now or when he was driving yesterday he is feeling light headed and he has been having a strange smell in his nose. Denies tinnitus, cp, sob, sore throat, ear pain or drainage, chills, sweats, history of vertigo/meniere's disease, labyrinthitis. States light headed feeling is becoming more frequent and lasting longer in duration. States occurs with changing positions. No LOC, head trauma. States he tried increasing water intake. NKDA.     Dizziness:   Chronicity:  New  Onset:  1 to 4 weeks ago  Progression since onset:  Unchanged  Dizziness characteristics:  Off-balance and spinning inside head only   Associated symptoms: a fever, nausea (when light headedness occurs) and light-headedness.no hearing loss, no ear congestion, no ear pain, no headaches, no tinnitus, no vomiting, no diaphoresis, no aural fullness, no weakness, no visual disturbances, no syncope, no palpitations, no panic, no facial weakness, no slurred speech, no numbness in extremities and no chest pain.  Aggravated by:  Nothing  Treatments tried:  Nothing  Improvements on treatment:  No relief      Constitution: Positive for fever and generalized weakness. Negative for sweating.   HENT:  Negative for ear pain, tinnitus and hearing loss.    Neck: Negative for neck stiffness. "   Cardiovascular:  Negative for chest pain, palpitations and passing out.   Eyes:  Negative for eye discharge, eye pain, photophobia, vision loss, double vision and blurred vision.   Gastrointestinal:  Positive for nausea (when light headedness occurs). Negative for abdominal pain and vomiting.   Musculoskeletal:  Positive for muscle cramps and muscle ache.   Skin:  Negative for rash.   Neurological:  Positive for light-headedness. Negative for history of vertigo, passing out, facial drooping, speech difficulty, headaches, history of migraines, loss of consciousness, numbness, tingling and tremors.      Objective:     Vitals:    03/29/25 0937   BP: (!) 144/60   Pulse: 89   Resp:    Temp:        Physical Exam   Constitutional: He is oriented to person, place, and time. He appears well-developed. He is cooperative.  Non-toxic appearance. He does not appear ill. No distress.   HENT:   Head: Normocephalic and atraumatic.   Ears:   Right Ear: Hearing, tympanic membrane, external ear and ear canal normal. No no drainage, swelling or tenderness. Tympanic membrane is not erythematous and not bulging. no impacted cerumen  Left Ear: Hearing, tympanic membrane, external ear and ear canal normal. No no drainage, swelling or tenderness. Tympanic membrane is not erythematous and not bulging. no impacted cerumen  Nose: Nose normal. No mucosal edema, rhinorrhea or nasal deformity. No epistaxis. Right sinus exhibits no maxillary sinus tenderness and no frontal sinus tenderness. Left sinus exhibits no maxillary sinus tenderness and no frontal sinus tenderness.   Mouth/Throat: Uvula is midline, oropharynx is clear and moist and mucous membranes are normal. Mucous membranes are moist. No trismus in the jaw. Normal dentition. No uvula swelling. No oropharyngeal exudate, posterior oropharyngeal edema, posterior oropharyngeal erythema or cobblestoning. No tonsillar exudate.   Eyes: Conjunctivae and lids are normal. Pupils are equal,  round, and reactive to light. Right eye exhibits no discharge. Left eye exhibits no discharge. Right conjunctiva is not injected. Left conjunctiva is not injected. No scleral icterus. Extraocular movement intact gaze aligned appropriately periorbital hyperpigmentation  Neck: Trachea normal and phonation normal. Neck supple. No edema present. No erythema present. No neck rigidity present.   Cardiovascular: Normal rate, regular rhythm, normal heart sounds and normal pulses.   Pulmonary/Chest: Effort normal and breath sounds normal. No accessory muscle usage or stridor. No tachypnea. No respiratory distress. He has no decreased breath sounds. He has no wheezes. He has no rhonchi. He has no rales.   Abdominal: Normal appearance and bowel sounds are normal. He exhibits no distension and no mass. Soft. There is no abdominal tenderness. There is no rebound, no guarding, no tenderness at McBurney's point and negative Rovsing's sign.   Musculoskeletal: Normal range of motion.         General: No deformity. Normal range of motion.   Lymphadenopathy:     He has cervical adenopathy.   Neurological: no focal deficit. He is alert and oriented to person, place, and time. He has normal sensation, normal strength and intact cranial nerves (2-12). He displays no weakness, facial symmetry and no dysarthria. No cranial nerve deficit. He exhibits normal muscle tone. Gait normal. Coordination normal. GCS eye subscore is 4. GCS verbal subscore is 5. GCS motor subscore is 6.   Skin: Skin is warm, dry, intact, not diaphoretic, not pale and no rash.   Psychiatric: His speech is normal and behavior is normal. Judgment and thought content normal.   Nursing note and vitals reviewed.      Assessment:     1. Orthostatic lightheadedness    2. Body aches    3. Subjective fever    4. Weakness    5. Muscle cramps    6. Nausea      Results for orders placed or performed in visit on 03/29/25   POCT Influenza A/B MOLECULAR    Collection Time: 03/29/25  10:14 AM   Result Value Ref Range    POC Molecular Influenza A Ag Negative Negative    POC Molecular Influenza B Ag Negative Negative     Acceptable Yes    SARS Coronavirus 2 Antigen, POCT Manual Read    Collection Time: 03/29/25 10:14 AM   Result Value Ref Range    SARS Coronavirus 2 Antigen Negative Negative, Presumptive Negative     Acceptable Yes    POCT Glucose, Hand-Held Device    Collection Time: 03/29/25 10:15 AM   Result Value Ref Range    POC Glucose 92 70 - 110 MG/DL       Plan:       Orthostatic lightheadedness  -     Orthostatic vital signs    Body aches  -     POCT Influenza A/B MOLECULAR  -     SARS Coronavirus 2 Antigen, POCT Manual Read    Subjective fever  -     POCT Influenza A/B MOLECULAR  -     SARS Coronavirus 2 Antigen, POCT Manual Read    Weakness  -     POCT Glucose, Hand-Held Device    Muscle cramps  -     POCT Glucose, Hand-Held Device    Nausea  -     ondansetron (ZOFRAN-ODT) 4 MG TbDL; Take 1 tablet (4 mg total) by mouth every 8 (eight) hours as needed (nausea).  Dispense: 15 tablet; Refill: 0  -     POCT Glucose, Hand-Held Device        Patient Instructions     PLEASE READ YOUR DISCHARGE INSTRUCTIONS ENTIRELY AS IT CONTAINS IMPORTANT INFORMATION.    - Get rest and drink plenty of fluids    - You can use tylenol or ibuprofen as directed as long as you don't have any allergies to these medications or medical conditions such as ulcers, liver or kidney disease or blood thinners etc that would prevent you from taking these meds.     - Slowly change from laying, sitting, and standing positions to help lightheadedness/dizziness.    - Sit or lie down immediately when dizziness/lightheaded to avoid further injury.     - Watch for increase pain, fever, vomiting, neck pain or mental confusion.    - Use the zofran as needed for nausea- it dissolves under your tongue.     Please go to the emergency room if you experience chest pain, shortness of breath, funny heart  "beats, headache, blurred vision, weakness in one arm or leg, slurred speech, numbness, inability to walk or talk, confusion.     Try to avoid common triggers of headaches (stress, menstruation, visual stimuli, weather changes, nitrates, fasting, wine, lack of sleep, smoking, odors, chocolate)     Please return or see your primary care doctor if you develop new or worsening symptoms.     Please arrange follow up with your primary medical clinic as soon as possible. You must understand that you've received an Urgent Care treatment only and that you may be released before all of your medical problems are known or treated. You, the patient, will arrange for follow up as instructed. If your symptoms worsen or fail to improve you should go to the Emergency Room.    WE CANNOT RULE OUT ALL POSSIBLE CAUSES OF YOUR SYMPTOMS IN THE URGENT CARE SETTING PLEASE GO TO THE ER IF YOU FEELS YOUR CONDITION IS WORSENING OR YOU WOULD LIKE EMERGENT EVALUATION.           Medical Decision Making:   Urgent Care Management:  Orthostatic vitals reviewed with patient. Glucose WNL. COVID/flu negative. Zofran prescribed for nausea. PCP follow up recommended for persistent symptoms. Neurologic exam without evidence of meningismus, AMS, focal neurologic findings so doubt meningitis, encephalitis, stroke. Presentation not consistent with acute intracranial bleed to include SAH (lack of risk factors, headache history). No history of trauma so doubt ICH. Patient still concerned about symptoms. Strict ER precautions were given. Patient not having chest pain or shortness of breath at present. EKG deferred. Work excuse given for this weekend. He states that he has been busier at work "and just need a week." No further questions or concerns. States he will see how he feels after this weekend and may go to the ER if symptoms persist. States he went to ER this morning but left without being seen due to potential wait times.           "

## 2025-03-29 NOTE — LETTER
March 29, 2025      [facility]  [address], [address]  [facility] [address]  Phone: [phone]  Fax: [phone]       Patient: Bradley Petty   YOB: 1960  Date of Visit: 03/29/2025    To Whom It May Concern:    Melonie Petty  was at [facility] on 03/29/2025. The patient may return to work on 4/1/2025 with no restrictions. If you have any questions or concerns, or if I can be of further assistance, please do not hesitate to contact me.    Sincerely,        Clair Hernandez PA-C

## 2025-03-29 NOTE — PATIENT INSTRUCTIONS
PLEASE READ YOUR DISCHARGE INSTRUCTIONS ENTIRELY AS IT CONTAINS IMPORTANT INFORMATION.    - Get rest and drink plenty of fluids    - You can use tylenol or ibuprofen as directed as long as you don't have any allergies to these medications or medical conditions such as ulcers, liver or kidney disease or blood thinners etc that would prevent you from taking these meds.     - Slowly change from laying, sitting, and standing positions to help lightheadedness/dizziness.    - Sit or lie down immediately when dizziness/lightheaded to avoid further injury.     - Watch for increase pain, fever, vomiting, neck pain or mental confusion.    - Use the zofran as needed for nausea- it dissolves under your tongue.     Please go to the emergency room if you experience chest pain, shortness of breath, funny heart beats, headache, blurred vision, weakness in one arm or leg, slurred speech, numbness, inability to walk or talk, confusion.     Try to avoid common triggers of headaches (stress, menstruation, visual stimuli, weather changes, nitrates, fasting, wine, lack of sleep, smoking, odors, chocolate)     Please return or see your primary care doctor if you develop new or worsening symptoms.     Please arrange follow up with your primary medical clinic as soon as possible. You must understand that you've received an Urgent Care treatment only and that you may be released before all of your medical problems are known or treated. You, the patient, will arrange for follow up as instructed. If your symptoms worsen or fail to improve you should go to the Emergency Room.    WE CANNOT RULE OUT ALL POSSIBLE CAUSES OF YOUR SYMPTOMS IN THE URGENT CARE SETTING PLEASE GO TO THE ER IF YOU FEELS YOUR CONDITION IS WORSENING OR YOU WOULD LIKE EMERGENT EVALUATION.

## 2025-03-29 NOTE — ED PROVIDER NOTES
SCRIBE #1 NOTE: I, Dahlia Ching, am scribing for, and in the presence of, Nish Lux DO. I have scribed the entire note.       History     Chief Complaint   Patient presents with    Dizziness     Waves of dizziness that worsens with and without movement, joint pain, and weakness present for a week. Pt c/o a knot at the top of the head that started forming a month ago that's growing larger. Denies any trauma/injury.      Review of patient's allergies indicates:  No Known Allergies      History of Present Illness     HPI    3/29/2025, 12:14 PM  History obtained from the patient and medical records      History of Present Illness: Bradley Petty Jr. is a 64 y.o. male patient with a PMHx of acid reflux, HTN, Sjogren's syndrome, sacroiliitis, DM, and CKD who presents to the Emergency Department for evaluation of dizziness which began a week ago. Pt says he has been busy, so he has been unable to come to the ED. Pt says he feels dizzy with and without movement.  Pt also c/o a knot on top of his head, denies trauma. Symptoms are constant and moderate in severity. Associated sxs include nausea, bilateral LE cramps, fatigue, generalized body aches, chest tightness, and SOB (once, not often). Patient denies any blurry vision or ear pain. No prior Tx specified. Pt denies any allergies to medications or starting any new medications. No further complaints or concerns at this time.       Arrival mode: Personal Transportation    PCP: Jesse Barnes MD        Past Medical History:  Past Medical History:   Diagnosis Date    Acid reflux     Arthralgia     CKD (chronic kidney disease)     Diabetes mellitus     Glaucoma     Hypertension     Sacroiliitis 08/29/2022    Sjogren's syndrome     Sleep apnea        Past Surgical History:  Past Surgical History:   Procedure Laterality Date    BACK SURGERY      EPIDURAL STEROID INJECTION INTO CERVICAL SPINE N/A 10/21/2022    Procedure: C5/6 IL ALLIE w/RN IV Sedation;  Surgeon:  Tiffany Diaz MD;  Location: [facility];  Service: Pain Management;  Laterality: N/A;    INJECTION OF JOINT Right 8/29/2022    Procedure: Right sacroiliac joint + GT bursa injection with RN IV sedation;  Surgeon: Tiffany Diaz MD;  Location: [facility];  Service: Pain Management;  Laterality: Right;    SELECTIVE INJECTION OF ANESTHETIC AGENT AROUND LUMBAR SPINAL NERVE ROOT BY TRANSFORAMINAL APPROACH Bilateral 12/30/2022    Procedure: Bilateral L5/S1 TF ALLIE w/RN IV Sedation;  Surgeon: Tiffany Diaz MD;  Location: [facility];  Service: Pain Management;  Laterality: Bilateral;         Family History:  Family History   Problem Relation Name Age of Onset    Diabetes Mellitus Maternal Grandmother Madear     Rheum arthritis Maternal Grandmother Madear     Cancer Maternal Grandmother Madear     Glaucoma Maternal Grandmother Madear     Cancer Mother Mother         kidney left    Melanoma Neg Hx      Psoriasis Neg Hx      Lupus Neg Hx      Acne Neg Hx         Social History:  Social History     Tobacco Use    Smoking status: Never    Smokeless tobacco: Never    Tobacco comments:     Never used   Substance and Sexual Activity    Alcohol use: No    Drug use: Never    Sexual activity: Not Currently     Partners: Female     Birth control/protection: None        Review of Systems     Review of Systems   Constitutional:  Positive for fatigue.        (+) Body aches   HENT:  Negative for ear pain.    Eyes:  Negative for visual disturbance.   Cardiovascular:         (+) BLE cramps   Gastrointestinal:  Positive for nausea.   Neurological:  Positive for dizziness.      Physical Exam   General:  Patient is awake, alert, in no apparent distress  Head:  Small lipoma noted to the right parietal region with no signs of infection, otherwise normocephalic and atraumatic with normal facial exam  Eyes:  PERRLA, EOMI  Heart:  Regular rate and rhythm, no murmurs noted  Lungs:  Clear to auscultation bilaterally no  wheezes  Musculoskeletal:  Pulses 2+ to all 4 extremities, there is no obvious deformity  Skin:  No acute rashes  Neurological:  Awake alert and oriented x3, there is no muscle weakness, no sensation abnormalities, good finger-to-nose and heel to shin exam, no pronator drift  Initial Vitals [03/29/25 1108]   BP Pulse Resp Temp SpO2   (!) 190/97 79 16 98.1 °F (36.7 °C) 98 %      MAP       --          ED Course   Procedures  ED Vital Signs:  Vitals:    03/29/25 1108 03/29/25 1143 03/29/25 1200 03/29/25 1201   BP: (!) 190/97 (!) 155/96 (!) 165/89    Pulse: 79 77 80 77   Resp: 16 16 20    Temp: 98.1 °F (36.7 °C)      TempSrc: Oral      SpO2: 98% 98% 100%    Weight: 114.5 kg (252 lb 6.8 oz)       03/29/25 1233 03/29/25 1235 03/29/25 1236 03/29/25 1315   BP: (!) 170/89 (!) 162/91 133/87 (!) 184/94   Pulse: 76 78 80 78   Resp:    20   Temp:       TempSrc:       SpO2: 98% 100% 100% 100%   Weight:        03/29/25 1400 03/29/25 1500 03/29/25 1515 03/29/25 1530   BP: (!) 178/102 (!) 184/130 (!) 173/100 (!) 179/104   Pulse: 78 93 78 79   Resp: 20 20 20 20   Temp:    97.8 °F (36.6 °C)   TempSrc:       SpO2: 100% 98% 99% 100%   Weight:        03/29/25 1600 03/29/25 1630 03/29/25 1700   BP: (!) 159/95 (!) 172/90 (!) 177/89   Pulse: 76 80 75   Resp: 20 20 20   Temp: 98.2 °F (36.8 °C)  98.1 °F (36.7 °C)   TempSrc:   Oral   SpO2: 100% 98% 100%   Weight:          Abnormal Lab Results:  Labs Reviewed   COMPREHENSIVE METABOLIC PANEL - Abnormal       Result Value    Sodium 136      Potassium 4.1      Chloride 104      CO2 25      Glucose 91      BUN 14      Creatinine 1.3      Calcium 9.3      Protein Total 8.1      Albumin 3.9      Bilirubin Total 0.4      ALP 79      AST 51 (*)     ALT 27      Anion Gap 7 (*)     eGFR >60     CK - Abnormal    CPK 1,130 (*)    URINALYSIS, REFLEX TO URINE CULTURE - Abnormal    Color, UA Yellow      Appearance, UA Clear      pH, UA 7.0      Spec Grav UA 1.020      Protein, UA Trace (*)     Glucose, UA  Negative      Ketones, UA Negative      Bilirubin, UA Negative      Blood, UA Negative      Nitrites, UA Negative      Urobilinogen, UA Negative      Leukocyte Esterase, UA Negative     CBC WITH DIFFERENTIAL - Abnormal    WBC 3.65 (*)     RBC 4.46 (*)     HGB 14.5      HCT 42.3      MCV 95      MCH 32.5      MCHC 34.3      RDW 11.6      Platelet Count 198      MPV 8.8 (*)     Nucleated RBC 0      Neut % 40.9      Lymph % 47.9      Mono % 7.1      Eos % 3.0      Basophil % 0.8      Imm Grans % 0.3      Neut # 1.49 (*)     Lymph # 1.75      Mono # 0.26 (*)     Eos # 0.11      Baso # 0.03      Imm Grans # 0.01     CK - Abnormal     (*)    INFLUENZA A & B BY MOLECULAR - Normal    INFLUENZA A MOLECULAR Negative      INFLUENZA B MOLECULAR  Negative     SARS-COV-2 RNA AMPLIFICATION, QUAL - Normal    SARS COV-2 Molecular Negative     TROPONIN I - Normal    Troponin-I <0.006     B-TYPE NATRIURETIC PEPTIDE - Normal    BNP 15     MAGNESIUM - Normal    Magnesium  1.8     TROPONIN I - Normal    Troponin-I <0.006     CBC W/ AUTO DIFFERENTIAL    Narrative:     The following orders were created for panel order CBC auto differential.  Procedure                               Abnormality         Status                     ---------                               -----------         ------                     CBC with Differential[[id number]]       Abnormal            Final result                 Please view results for these tests on the individual orders.        All Lab Results:  Results for orders placed or performed during the hospital encounter of 03/29/25   Repeat EKG 12-lead    Collection Time: 03/29/25 11:04 AM   Result Value Ref Range    QRS Duration 80 ms    [facility] QTC Calculation 407 ms   Urinalysis, Reflex to Urine Culture Urine, Clean Catch    Collection Time: 03/29/25 11:36 AM    Specimen: Urine   Result Value Ref Range    Color, UA Yellow Straw, Ginger, Yellow, Light-Orange    Appearance, UA Clear Clear    pH, UA 7.0  5.0 - 8.0    Spec Grav UA 1.020 1.005 - 1.030    Protein, UA Trace (A) Negative    Glucose, UA Negative Negative    Ketones, UA Negative Negative    Bilirubin, UA Negative Negative    Blood, UA Negative Negative    Nitrites, UA Negative Negative    Urobilinogen, UA Negative <2.0 EU/dL    Leukocyte Esterase, UA Negative Negative   Influenza A & B by Molecular    Collection Time: 03/29/25 11:41 AM    Specimen: Nasal Swab   Result Value Ref Range    INFLUENZA A MOLECULAR Negative Negative    INFLUENZA B MOLECULAR  Negative Negative   Comprehensive metabolic panel    Collection Time: 03/29/25 11:41 AM   Result Value Ref Range    Sodium 136 136 - 145 mmol/L    Potassium 4.1 3.5 - 5.1 mmol/L    Chloride 104 95 - 110 mmol/L    CO2 25 23 - 29 mmol/L    Glucose 91 70 - 110 mg/dL    BUN 14 8 - 23 mg/dL    Creatinine 1.3 0.5 - 1.4 mg/dL    Calcium 9.3 8.7 - 10.5 mg/dL    Protein Total 8.1 6.0 - 8.4 gm/dL    Albumin 3.9 3.5 - 5.2 g/dL    Bilirubin Total 0.4 0.1 - 1.0 mg/dL    ALP 79 40 - 150 unit/L    AST 51 (H) 11 - 45 unit/L    ALT 27 10 - 44 unit/L    Anion Gap 7 (L) 8 - 16 mmol/L    eGFR >60 >60 mL/min/1.73/m2   COVID-19 Rapid Screening    Collection Time: 03/29/25 11:41 AM   Result Value Ref Range    SARS COV-2 Molecular Negative Negative   CPK    Collection Time: 03/29/25 11:41 AM   Result Value Ref Range    CPK 1,130 (H) 20 - 200 U/L   Troponin I    Collection Time: 03/29/25 11:41 AM   Result Value Ref Range    Troponin-I <0.006 <=0.026 ng/mL   Brain natriuretic peptide    Collection Time: 03/29/25 11:41 AM   Result Value Ref Range    BNP 15 0 - 99 pg/mL   CBC with Differential    Collection Time: 03/29/25 11:41 AM   Result Value Ref Range    WBC 3.65 (L) 3.90 - 12.70 K/uL    RBC 4.46 (L) 4.60 - 6.20 M/uL    HGB 14.5 14.0 - 18.0 gm/dL    HCT 42.3 40.0 - 54.0 %    MCV 95 82 - 98 fL    MCH 32.5 27.0 - 50.0 pg    MCHC 34.3 32.0 - 36.0 g/dL    RDW 11.6 11.5 - 14.5 %    Platelet Count 198 150 - 450 K/uL    MPV 8.8 (L) 9.2 -  12.9 fL    Nucleated RBC 0 <=0 /100 WBC    Neut % 40.9 38 - 73 %    Lymph % 47.9 18 - 48 %    Mono % 7.1 4 - 15 %    Eos % 3.0 <=8 %    Basophil % 0.8 <=1.9 %    Imm Grans % 0.3 0.0 - 0.5 %    Neut # 1.49 (L) 1.8 - 7.7 K/uL    Lymph # 1.75 1 - 4.8 K/uL    Mono # 0.26 (L) 0.3 - 1 K/uL    Eos # 0.11 <=0.5 K/uL    Baso # 0.03 <=0.2 K/uL    Imm Grans # 0.01 0.00 - 0.04 K/uL   Magnesium    Collection Time: 03/29/25 11:41 AM   Result Value Ref Range    Magnesium  1.8 1.6 - 2.6 mg/dL   EKG 12-lead    Collection Time: 03/29/25  3:33 PM   Result Value Ref Range    QRS Duration 82 ms    [facility]HS QTC Calculation 409 ms   Troponin I    Collection Time: 03/29/25  4:55 PM   Result Value Ref Range    Troponin-I <0.006 <=0.026 ng/mL   CPK    Collection Time: 03/29/25  4:55 PM   Result Value Ref Range     (H) 20 - 200 U/L       Imaging Results:  Imaging Results              CTA Head and Neck (xpd) (Final result)  Result time 03/29/25 13:33:57      Final result by Messi Fleming MD (03/29/25 13:33:57)                   Impression:      1.  Negative for acute intracranial process.  Negative for hemorrhage or skull fracture.    2.  Negative for significant stenosis or occlusion involving the intracranial or neck vasculature.  Negative for aneurysm or dissection.    3.  Cerebral atrophy noted.  Intracranial atherosclerotic disease noted.  Small vessel ischemic changes noted.    4.  Nonemergent findings as described above.      Electronically signed by: Messi Fleming MD  Date:    03/29/2025  Time:    13:33               Narrative:    EXAMINATION:  CTA HEAD AND NECK (XPD)    CLINICAL HISTORY:  Dizziness, persistent/recurrent, cardiac or vascular cause suspected;    TECHNIQUE:  Non contrast low dose axial images were obtained through the head. CT angiogram was performed from the level of the alea to the top of the head following the IV administration of Omnipaque 350.   Sagittal and coronal reconstructions and maximum intensity  projection reconstructions were performed. Arterial stenosis percentages are based on NASCET measurement criteria.    COMPARISON:  None    FINDINGS:  Noncontrast head CT scan:    The ventricles are midline and the CSF spaces are normal for age.  The gray-white matter junction is well preserved. Negative for intracranial vascular abnormalities. Negative for mass, mass effect, cerebral edema, hemorrhage or abnormal fluid collections.  Intracranial atherosclerotic disease noted.  Small vessel ischemic changes noted.  Falx calcifications.  Anterior falx lipoma.  Arachnoid granulation calcifications.    Skull/Extracranial Contents (limited evaluation): Patchy ethmoid air cell disease.  The rest of the paranasal sinuses, mastoid air cells, middle ears and ear canals are clear.  The orbits and globes are intact.    Left-sided dental caries and apical abscesses noted.  The facial bones are intact.  Disc height reduction with endplate sclerosis and marginal spondylosis at the C5/C6 and C6/C7 levels.  Degenerative changes between the anterior arch of C1 and the dens.    Non-Vascular Structures of the Neck/Thoracic Inlet (limited evaluation): The lung apices are clear.  The superior mediastinum is normal.  Cardiac chamber enlargement.    The airways are patent.  The neck soft tissues are normal..  The parotid, submandibular and thyroid glands are normal.  The parapharyngeal space, retropharyngeal space and prevertebral space are normal.    Aorta: Normal 3 vessel arch.    Extracranial carotid circulation: No hemodynamically significant stenosis, aneurysmal dilatation, or dissection.    Extracranial vertebral circulation: No hemodynamically significant stenosis, aneurysmal dilatation, or dissection.    Intracranial Arteries: No focal high-grade stenosis, occlusion, or aneurysm.    Venous structures (limited evaluation): Normal.                                       X-Ray Chest AP Portable (Final result)  Result time 03/29/25  12:04:59      Final result by Messi Fleming MD (03/29/25 12:04:59)                   Impression:      1.  Negative for acute process involving the chest.    2.  Stable findings as noted above.      Electronically signed by: Messi Fleming MD  Date:    03/29/2025  Time:    12:04               Narrative:    EXAMINATION:  XR CHEST AP PORTABLE    CLINICAL HISTORY:  Dizziness and giddiness    COMPARISON:  May 13, 2019    FINDINGS:  The lungs are clear. The cardiac silhouette size is normal. The trachea is midline and the mediastinal width is normal. Negative for focal infiltrate, effusion or pneumothorax. Pulmonary vasculature is normal. Negative for osseous abnormalities. Tortuous aorta with calcifications of the aortic knob.  There are degenerative changes of the spine and both shoulder girdles..  Eventration of the hemidiaphragms.                                       1st EKG was ordered, reviewed, and independently interpreted by the ED provider.  Interpretation time: 11:04  Rate: 72 BPM  Rhythm: normal sinus rhythm  Interpretation: Septal infarct, age undetermined. Abnormal ECG. No STEMI.    2nd EKG was ordered, reviewed, and independently interpreted by the ED provider.  Interpretation time: 3:33  Rate: 76 BPM  Rhythm: normal sinus rhythm  Interpretation: No acute ST changes. No STEMI.         The Emergency Provider reviewed the vital signs and test results, which are outlined above.     ED Discussion     3:33 PM: Re-evaluated pt.  at bedside. Patient is resting comfortably and is in no acute distress.  Pt states his dizziness has subsided and states he feels better.  Discussed with pt and/or family/caretaker all pertinent results. Discussed with pt and/or family/caretaker any concerns expressed at this time. Answered all questions. Pt and/or family/caretaker express understanding at this time.     5:53 PM: Reassessed pt at this time. Discussed with patient and/or family/caretaker all pertinent ED information and  results. Discussed pt dx and plan of tx. Gave the patient all f/u and return to the ED instructions. All questions and concerns were addressed at this time. Patient and/or family/caretaker expresses understanding of information and instructions, and is comfortable with plan to discharge. Pt is stable for discharge.     I discussed with patient and/or family/caretaker that evaluation in the ED does not suggest any emergent or life threatening medical conditions requiring immediate intervention beyond what was provided in the ED, and I believe patient is safe for discharge.  Regardless, an unremarkable evaluation in the ED does not preclude the development or presence of a serious of life threatening condition. As such, I instructed that the patient is to return immediately for any worsening or change in current symptoms.     ED Course as of 04/01/25 2222   Sat Mar 29, 2025   1502 Magnesium  Within normal limits [CD]   1502 Comprehensive metabolic panel(!)  Nonspecific finding [CD]   1502 Troponin I  Within normal limits [CD]   1502 Influenza A & B by Molecular  Negative [CD]   1502 CBC auto differential(!)  Nonspecific findings [CD]   1503 Urinalysis, Reflex to Urine Culture Urine, Clean Catch(!)  No UTI [CD]   1503 Brain natriuretic peptide  Within normal limits [CD]   1503 COVID-19 Rapid Screening  Negative [CD]   1503 CPK(!)  Elevated consistent with rhabdomyolysis [CD]   1503 CTA Head and Neck (xpd)   Negative for acute intracranial process.  Negative for hemorrhage or skull fracture.     2.  Negative for significant stenosis or occlusion involving the intracranial or neck vasculature.  Negative for aneurysm or dissection.     3.  Cerebral atrophy noted.  Intracranial atherosclerotic disease noted.  Small vessel ischemic changes noted.   [CD]   1503 X-Ray Chest AP Portable  No acute findings [CD]   1749 Troponin I  Troponin x2 results is negative [CD]   1750 Comprehensive metabolic panel(!)  Nonspecific findings  [CD]   1750 CPK(!)  CPK is improving [CD]   1751 Influenza A & B by Molecular  Negative [CD]   1751 CBC auto differential(!)  Nonspecific findings [CD]   1751 Magnesium  Within normal limits [CD]   1751 COVID-19 Rapid Screening  Negative [CD]   1751 Brain natriuretic peptide  Within normal limit [CD]   1751 Urinalysis, Reflex to Urine Culture Urine, Clean Catch(!)  No UTI [CD]      ED Course User Index  [CD] Nish Lux, DO     Medical Decision Making  On final re-evaluation all of patient's symptoms have resolved and he is able to tolerate fluids and ambulate around the department with no difficulty and with no assistance.  He states he is ready to go home.  He has an upcoming ENT appointment already scheduled and I instructed him to make this appointment without fail.  He will discuss his dizziness with provider at this appointment.  He is instructed to return immediately for any new or worsening symptoms and he verbalized understanding.    Amount and/or Complexity of Data Reviewed  Labs: ordered. Decision-making details documented in ED Course.  Radiology: ordered. Decision-making details documented in ED Course.  ECG/medicine tests: ordered and independent interpretation performed. Decision-making details documented in ED Course.    Risk  OTC drugs.  Prescription drug management.  Risk Details: Differential diagnoses:  Vertigo, CVA, cerebellar stroke, infectious etiology, electrolyte abnormality, dehydration, otitis media, Meniere's disease, vestibular , neuritis benign paroxysmal positional vertigo, brainstem ischemia , ACS, heart failure, electrolyte abnormality, dysrhythmia                ED Medication(s):  Medications   carbamide peroxide 6.5 % otic solution 5 drop (5 drops Both Ears Given 3/29/25 1346)   lactated ringers bolus 2,000 mL (0 mLs Intravenous Stopped 3/29/25 1756)   iohexoL (OMNIPAQUE 350) injection 100 mL (100 mLs Intravenous Given 3/29/25 1307)   meclizine tablet 25 mg (25 mg Oral  Given 3/29/25 1612)       Discharge Medication List as of 3/29/2025  5:58 PM        START taking these medications    Details   meclizine (ANTIVERT) 25 mg tablet Take 1 tablet (25 mg total) by mouth 3 (three) times daily as needed for Dizziness., Starting Sat 3/29/2025, Print              Follow-up Information       Schedule an appointment as soon as possible for a visit  with Jesse Barnes MD.    Specialty: Internal Medicine  Contact information:  [address]  [place] [address]  [phone]               Eleazar Chu MD.    Specialty: Otolaryngology  Why: Go to your scheduled appointment on 4/2/2025  Contact information:  [id number] [facility] [address]  [phone]                                 Scribe Attestation:   Scribe #1: I performed the above scribed service and the documentation accurately describes the services I performed. I attest to the accuracy of the note.     Attending:   Physician Attestation Statement for Scribe #1: I, Nish Lux DO, personally performed the services described in this documentation, as scribed by Dahlia Ching, in my presence, and it is both accurate and complete.           Clinical Impression       ICD-10-CM ICD-9-CM   1. Dizziness  R42 780.4       Disposition:   Disposition: Discharged  Condition: Stable        Nish Lux DO  04/01/25 2224

## 2025-03-29 NOTE — FIRST PROVIDER EVALUATION
Medical screening examination initiated.  I have conducted a focused provider triage encounter, findings are as follows:    Brief history of present illness:  65 yo male presents to ed with complaint of dizziness that began about a week ago. Reports worsening over the past two days. Reports chest pain that occurred about one week ago when symptoms began but has now subsided.     Vitals:    03/29/25 1108   BP: (!) 190/97   BP Location: Right arm   Pulse: 79   Resp: 16   Temp: 98.1 °F (36.7 °C)   TempSrc: Oral   SpO2: 98%   Weight: 114.5 kg (252 lb 6.8 oz)       Pertinent physical exam:  Dizziness, worse with movement, hypertensive.      Brief workup plan:  workup    Preliminary workup initiated; this workup will be continued and followed by the physician or advanced practice provider that is assigned to the patient when roomed.

## 2025-03-30 LAB
OHS QRS DURATION: 80 MS
OHS QRS DURATION: 82 MS
OHS QTC CALCULATION: 407 MS
OHS QTC CALCULATION: 409 MS

## 2025-04-02 ENCOUNTER — OFFICE VISIT (OUTPATIENT)
Dept: OTOLARYNGOLOGY | Facility: CLINIC | Age: 65
End: 2025-04-02
Payer: COMMERCIAL

## 2025-04-02 ENCOUNTER — OFFICE VISIT (OUTPATIENT)
Dept: UROLOGY | Facility: CLINIC | Age: 65
End: 2025-04-02
Payer: COMMERCIAL

## 2025-04-02 ENCOUNTER — LAB VISIT (OUTPATIENT)
Dept: LAB | Facility: HOSPITAL | Age: 65
End: 2025-04-02
Attending: UROLOGY
Payer: COMMERCIAL

## 2025-04-02 VITALS — BODY MASS INDEX: 35.34 KG/M2 | HEIGHT: 71 IN | WEIGHT: 252.44 LBS

## 2025-04-02 DIAGNOSIS — R22.9 SUBCUTANEOUS NODULE: ICD-10-CM

## 2025-04-02 DIAGNOSIS — R97.20 ELEVATED PSA: Primary | ICD-10-CM

## 2025-04-02 DIAGNOSIS — J30.89 NON-SEASONAL ALLERGIC RHINITIS, UNSPECIFIED TRIGGER: Primary | ICD-10-CM

## 2025-04-02 DIAGNOSIS — L72.11 PILAR CYST OF SCALP: ICD-10-CM

## 2025-04-02 DIAGNOSIS — R97.20 ELEVATED PSA: ICD-10-CM

## 2025-04-02 DIAGNOSIS — R42 DIZZINESS: ICD-10-CM

## 2025-04-02 PROCEDURE — 99999 PR PBB SHADOW E&M-EST. PATIENT-LVL IV: CPT | Mod: PBBFAC,,, | Performed by: UROLOGY

## 2025-04-02 PROCEDURE — 1159F MED LIST DOCD IN RCRD: CPT | Mod: CPTII,S$GLB,, | Performed by: UROLOGY

## 2025-04-02 PROCEDURE — 99999 PR PBB SHADOW E&M-EST. PATIENT-LVL III: CPT | Mod: PBBFAC,,, | Performed by: OTOLARYNGOLOGY

## 2025-04-02 PROCEDURE — 36415 COLL VENOUS BLD VENIPUNCTURE: CPT

## 2025-04-02 PROCEDURE — 3008F BODY MASS INDEX DOCD: CPT | Mod: CPTII,S$GLB,, | Performed by: UROLOGY

## 2025-04-02 PROCEDURE — 84153 ASSAY OF PSA TOTAL: CPT

## 2025-04-02 PROCEDURE — 99214 OFFICE O/P EST MOD 30 MIN: CPT | Mod: S$GLB,,, | Performed by: OTOLARYNGOLOGY

## 2025-04-02 PROCEDURE — 1160F RVW MEDS BY RX/DR IN RCRD: CPT | Mod: CPTII,S$GLB,, | Performed by: UROLOGY

## 2025-04-02 PROCEDURE — 99214 OFFICE O/P EST MOD 30 MIN: CPT | Mod: S$GLB,,, | Performed by: UROLOGY

## 2025-04-02 RX ORDER — AZELASTINE 1 MG/ML
1 SPRAY, METERED NASAL 2 TIMES DAILY
Qty: 30 ML | Refills: 5 | Status: SHIPPED | OUTPATIENT
Start: 2025-04-02 | End: 2026-04-02

## 2025-04-02 NOTE — Clinical Note
Hello -  I was seeing this patient today for a few different issues.  His biggest concern right now is dizziness which he describes primarily as lightheadedness and not spinning.  We are going to do a hallpike today which I suspect will be normal.  He was seen in the ER and his BP was significantly elevated.  He was asking me if he should see you first or go directly to cardiology to have a workup - I told him that I would send you a message so that y'all could discuss it.    Thanks!

## 2025-04-02 NOTE — PROGRESS NOTES
Chief Complaint: OAB    HPI:   04/02/2025 - returns today for follow-up, has been taking the Detrol and Myrbetriq combined and notes no improvement, no gross hematuria dysuria, interested in proceeding with the procedure    01/07/2025 - returns today for follow-up after an absence, has continued to take the VESIcare and Flomax, now taking two capsules of Flomax daily, now notes that his symptoms are worsened, now wearing a pull-up due to profound urgency and urge incontinence, no gross hematuria or dysuria, nocturia stable, has not yet gotten a new CPAP  IPSS - 5/4/4/5/4/1/5 = 28  QOL - 6(terrible)    11/29/2022 - returns for follow-up, increased dose of vesicare has improved his OAB but patient notes that it's 'not there yet', no SEs, nocturia stable    09/07/2022 - patient returns today for follow-up, is still taking the VESIcare, but notes that his urgency has returned, still waking up many times per night, states that he is now filling up the urinal, states that he filled out the voiding diary but forgot to bring it for review, denies any gross hematuria or dysuria, does not restrict fluids prior to bed and will sip on fluids when he wakes up at night, has not yet received his replacement CPAP    07/28/2022 - returns for follow-up, vesicare working but only for the daytime urgency and frequency, nocturia still very present, takes the med in the AM, stream also improved, notes +history of DA, was on a CPAP but Lacie had a recall and he has not been using one for sometime, though he does admit that his urinary issues were present prior, no GH or dysuria    06/09/2022 - returns today for follow-up, was started on Flomax by NP Rushing at his last visit, patient states that initially it helped but he has had recurrence of symptoms, has urgency and infrequent urge incontinence, uses a paper towel his pants that he does not soak through his underwear, also notes a slow stream, as well as nocturia times 8-10,  denies any gross hematuria, admits that he does not drink enough fluids, may have one cup of coffee in the morning and nothing else during the day until the evening time, he does not restrict fluids prior to bed, denies gross hematuria or dysuria, concerned that he will have to start wearing diapers as he is going on a trip to [place] soon    Patient is a 61-year-old male that is presenting with a slow stream and nocturia.  Patient was seen by primary care provider and has a PSA lab schedule today.  Urine in clinic is negative.  PVR is low, 60 mL.  No BPH meds.   No abd/pelvic pain and no exac/rel factors.  No hematuria.  No urolithiasis.      Allergies:  Patient has no known allergies.    Medications:  has a current medication list which includes the following prescription(s): acetaminophen, azelastine, b complex vitamins, cholecalciferol (vitamin d3), cyanocobalamin (vitamin b-12), emtricitabine-tenofovir 200-300 mg, herbal drugs, hydroquinone, meclizine, mirabegron, multivitamin, ondansetron, solifenacin, tolterodine, and trazodone.    Review of Systems:  General: No fever, chills  Skin: No rashes  Chest:  Denies cough and sputum production  Heart: Denies chest pain  Resp: Denies dyspnea  Abdomen: Denies diarrhea, abdominal pain, hematemesis, or blood in stool.  Musculoskeletal: No joint stiffness or swelling. Denies back pain.  : see HPI  Neuro: no dizziness or weakness      PMH:   has a past medical history of Acid reflux, Arthralgia, CKD (chronic kidney disease), Diabetes mellitus, Glaucoma, Hypertension, Sacroiliitis (08/29/2022), Sjogren's syndrome, and Sleep apnea.    PSH:   has a past surgical history that includes Back surgery; Injection of joint (Right, 8/29/2022); Epidural steroid injection into cervical spine (N/A, 10/21/2022); and Selective injection of anesthetic agent around lumbar spinal nerve root by transforaminal approach (Bilateral, 12/30/2022).    FamHx: family history includes Cancer in  his maternal grandmother and mother; Diabetes Mellitus in his maternal grandmother; Glaucoma in his maternal grandmother; Rheum arthritis in his maternal grandmother.    SocHx:  reports that he has never smoked. He has never used smokeless tobacco. He reports that he does not drink alcohol and does not use drugs.      Physical Exam:  There were no vitals filed for this visit.      General: awake, alert, cooperative  Head: NC/AT  Ears: external ears normal  Eyes: sclera normal  Lungs: normal inspiration, NAD  Heart: well-perfused  Ext: No c/c/e.  Neuro: grossly intact, AOx3   4/22: Test desc jemma, no abnormalities of epididymus. Penis  with normal penile and scrotal skin. Meatus normal. Normal rectal tone, no hemorrhoids. Prost 35 gm no nodules or masses appreciated. SV not palpable. Perineum and anus normal.    Labs/Studies:   Lab Results   Component Value Date    WBC 3.65 (L) 03/29/2025    HGB 14.5 03/29/2025    HCT 42.3 03/29/2025     03/29/2025     03/29/2025    K 4.1 03/29/2025     03/29/2025    CREATININE 1.3 03/29/2025    BUN 14 03/29/2025    CO2 25 03/29/2025    TSH 2.260 04/05/2024    PSA 5.7 (H) 04/05/2024    HGBA1C 5.5 04/05/2024    CHOL 196 04/05/2024    TRIG 151 (H) 04/05/2024    HDL 35 (L) 04/05/2024    ALT 27 03/29/2025    AST 51 (H) 03/29/2025     Impression/Plan:   Urgency - reviewed his options, at this point he has exhausted max medical therapy, he is unwilling to catheterize if Botox results in retention, will refer to Dr Willingham to discuss SNS placement    Elevated PSA - repeat PSA today, if continues to be elevated, will proceed with MRI    Weak stream - continue Flomax    Nocturia - limit fluids 2 hours prior to bed, recommend replacing his CPAP or discussing the inspire device with Dr. Edward Grijalva MD

## 2025-04-02 NOTE — PROGRESS NOTES
Referring Provider:    Caroline Woods Md  [address]  [address]  [place] [address]  Subjective:   Patient: Bradley Petty Jr. [id number], :1960   Visit date:2025 9:59 AM    Chief Complaint:  Dizziness (Left slightly worse , feels unstable, vision changes. Onset 2 weeks ago.  ), Consult (Subcutaneous nodule, crown of the head and right shoulder.  ), and Sinus Problem (Foul odor coming from nose, complain of facial swelling and pressure. )    HPI:    Prior notes reviewed by myself.  Clinical documentation obtained by nursing staff reviewed.     63 y/o gentleman here for evaluation of dizziness and multiple other ENT complaints.   He started experiencing dizziness which he describes as lightheadedness and near syncope with some blurry vision over the weekend.  He was seen at an urgent care and subsequently in the emergency department.  He was extremely hypertensive in the emergency department.  They pursued a workup including CT angiogram, results are below.  His dizziness has improved slightly but is still present.  He denies any acute changes in his hearing or tinnitus.  No recent head trauma or ear trauma.  He also complains of a subcutaneous nodule both on his scalp and on his right shoulder.  He states that his fowler squeeze the swelling on his scalp and express some sort of material which made it smaller.  Finally, he is complaining of a foul odor that he  notices and is concerned this may be consistent with a sinus infection.  No fever or discolored drainage.        Objective:     Physical Exam:  Vitals:  There were no vitals taken for this visit.  General appearance:  Well developed, well nourished    Ears:  Otoscopy of external auditory canals and tympanic membranes was normal, clinical speech reception thresholds grossly intact, no mass/lesion of auricle.    Nose:  No masses/lesions of external nose, nasal mucosa, septum, and turbinates were within normal limits.    Mouth:   No mass/lesion of lips, teeth, gums, hard/soft palate, tongue, tonsils, or oropharynx.    Neck & Lymphatics:  No cervical lymphadenopathy, no neck mass/crepitus/ asymmetry, trachea is midline, no thyroid enlargement/tenderness/mass. Mobile 2 cm cystic lesion vertex scalp.  Subcentimeter LN or cyst in the area of his right posterior neck overlying his trapezius muscle on the right side.      Neuro:  CN II-XII intact bilaterally, negative hallpike      Reviewed ER notes from 3/29    [x]  Data Reviewed:    Lab Results   Component Value Date    WBC 3.65 (L) 03/29/2025    HGB 14.5 03/29/2025    HCT 42.3 03/29/2025    MCV 95 03/29/2025    EOSINOPHIL 1.6 01/29/2025         [x]  Independent interpretation of test: apical abscesses and dental caries noted on the left side, all PNS clear  CTA Head and Neck (xpd)  Order: [id number]   Status: Final result       Next appt: 04/04/2025 at 10:45 AM in Urology (Christina Willingham MD)    Test Result Released: Yes (not seen)    0 Result Notes  Details    Reading Physician Reading Date Result Priority   Messi Fleming MD  [phone]  3/29/2025 STAT     Narrative & Impression  EXAMINATION:  CTA HEAD AND NECK (XPD)     CLINICAL HISTORY:  Dizziness, persistent/recurrent, cardiac or vascular cause suspected;     TECHNIQUE:  Non contrast low dose axial images were obtained through the head. CT angiogram was performed from the level of the alea to the top of the head following the IV administration of Omnipaque 350.   Sagittal and coronal reconstructions and maximum intensity projection reconstructions were performed. Arterial stenosis percentages are based on NASCET measurement criteria.     COMPARISON:  None     FINDINGS:  Noncontrast head CT scan:     The ventricles are midline and the CSF spaces are normal for age.  The gray-white matter junction is well preserved. Negative for intracranial vascular abnormalities. Negative for mass, mass effect, cerebral edema, hemorrhage or  abnormal fluid collections.  Intracranial atherosclerotic disease noted.  Small vessel ischemic changes noted.  Falx calcifications.  Anterior falx lipoma.  Arachnoid granulation calcifications.     Skull/Extracranial Contents (limited evaluation): Patchy ethmoid air cell disease.  The rest of the paranasal sinuses, mastoid air cells, middle ears and ear canals are clear.  The orbits and globes are intact.     Left-sided dental caries and apical abscesses noted.  The facial bones are intact.  Disc height reduction with endplate sclerosis and marginal spondylosis at the C5/C6 and C6/C7 levels.  Degenerative changes between the anterior arch of C1 and the dens.     Non-Vascular Structures of the Neck/Thoracic Inlet (limited evaluation): The lung apices are clear.  The superior mediastinum is normal.  Cardiac chamber enlargement.     The airways are patent.  The neck soft tissues are normal..  The parotid, submandibular and thyroid glands are normal.  The parapharyngeal space, retropharyngeal space and prevertebral space are normal.     Aorta: Normal 3 vessel arch.     Extracranial carotid circulation: No hemodynamically significant stenosis, aneurysmal dilatation, or dissection.     Extracranial vertebral circulation: No hemodynamically significant stenosis, aneurysmal dilatation, or dissection.     Intracranial Arteries: No focal high-grade stenosis, occlusion, or aneurysm.     Venous structures (limited evaluation): Normal.     Impression:     1.  Negative for acute intracranial process.  Negative for hemorrhage or skull fracture.     2.  Negative for significant stenosis or occlusion involving the intracranial or neck vasculature.  Negative for aneurysm or dissection.     3.  Cerebral atrophy noted.  Intracranial atherosclerotic disease noted.  Small vessel ischemic changes noted.     4.  Nonemergent findings as described above.        Electronically signed by:Messi Fleming MD  Date:                                             03/29/2025  Time:                                           13:33       BP reviewed from ER visit, up to 184/130      Assessment & Plan:   Non-seasonal allergic rhinitis, unspecified trigger  -     azelastine (ASTELIN) 137 mcg (0.1 %) nasal spray; 1 spray (137 mcg total) by Nasal route 2 (two) times daily.  Dispense: 30 mL; Refill: 5    Subcutaneous nodule  -     Ambulatory referral/consult to ENT    Dizziness    Pilar cyst of scalp         We reviewed his recent history, exam and CT angiogram in detail.  His blood pressure was extremely elevated in the emergency department.  His dizziness symptoms are more consistent with lightheadedness than spinning and his Hallpike's were negative today.  I suggested that he follow-up with his PCP and/or cardiologist for a full workup.   The cystic lesion on his scalp is consistent with a pilar cyst.  We discussed options including risks and benefits such as observation and excision.  He will consider his options.  The small lymph node or cyst in his right posterior neck is almost not palpable.  I recommended observation.  We reviewed his CT angiogram which demonstrated completely clear paranasal sinuses.  He is also not having any active symptoms of sinusitis.  I recommended saline nasal spray and Astelin for his allergic rhinitis symptoms.  I recommended that he follow-up with a dentist for the findings on the CT angiogram    He understands that He can f/u with us JAMAAL Chu M.D.  Department of Otolaryngology - Head & Neck Surgery  [address] [place] [address]  P: [phone]  F: [phone]        DISCLAIMER: This note was prepared with [employer] voice recognition transcription software. Garbled syntax, mangled pronouns, and other bizarre constructions may be attributed to that software system. While efforts were made to correct any mistakes made by this voice recognition program, some errors and/or omissions may remain in the note that  were missed when the note was originally created.

## 2025-04-03 LAB — PSA SERPL-MCNC: 9.95 NG/ML

## 2025-04-04 ENCOUNTER — OFFICE VISIT (OUTPATIENT)
Dept: INTERNAL MEDICINE | Facility: CLINIC | Age: 65
End: 2025-04-04
Payer: COMMERCIAL

## 2025-04-04 ENCOUNTER — TELEPHONE (OUTPATIENT)
Dept: RHEUMATOLOGY | Facility: CLINIC | Age: 65
End: 2025-04-04
Payer: COMMERCIAL

## 2025-04-04 ENCOUNTER — HOSPITAL ENCOUNTER (OUTPATIENT)
Dept: RADIOLOGY | Facility: HOSPITAL | Age: 65
Discharge: HOME | End: 2025-04-04
Attending: INTERNAL MEDICINE
Payer: COMMERCIAL

## 2025-04-04 ENCOUNTER — OFFICE VISIT (OUTPATIENT)
Dept: UROLOGY | Facility: CLINIC | Age: 65
End: 2025-04-04
Payer: COMMERCIAL

## 2025-04-04 VITALS
HEIGHT: 71 IN | SYSTOLIC BLOOD PRESSURE: 138 MMHG | DIASTOLIC BLOOD PRESSURE: 86 MMHG | HEART RATE: 83 BPM | BODY MASS INDEX: 35.1 KG/M2 | TEMPERATURE: 98 F | WEIGHT: 250.69 LBS | OXYGEN SATURATION: 98 %

## 2025-04-04 VITALS
SYSTOLIC BLOOD PRESSURE: 142 MMHG | DIASTOLIC BLOOD PRESSURE: 89 MMHG | HEART RATE: 83 BPM | RESPIRATION RATE: 18 BRPM | WEIGHT: 250.69 LBS | BODY MASS INDEX: 35.1 KG/M2 | HEIGHT: 71 IN

## 2025-04-04 DIAGNOSIS — M79.10 MYALGIA: ICD-10-CM

## 2025-04-04 DIAGNOSIS — R74.8 ELEVATED CK: ICD-10-CM

## 2025-04-04 DIAGNOSIS — Z00.00 ROUTINE GENERAL MEDICAL EXAMINATION AT A HEALTH CARE FACILITY: Primary | ICD-10-CM

## 2025-04-04 DIAGNOSIS — R41.3 MEMORY DIFFICULTY: ICD-10-CM

## 2025-04-04 DIAGNOSIS — M25.569 KNEE PAIN, UNSPECIFIED CHRONICITY, UNSPECIFIED LATERALITY: ICD-10-CM

## 2025-04-04 DIAGNOSIS — M25.511 TRIGGER POINT OF SHOULDER REGION, RIGHT: ICD-10-CM

## 2025-04-04 DIAGNOSIS — R35.0 FREQUENCY OF MICTURITION: Primary | ICD-10-CM

## 2025-04-04 DIAGNOSIS — M25.50 ARTHRALGIA, UNSPECIFIED JOINT: ICD-10-CM

## 2025-04-04 DIAGNOSIS — R97.20 ELEVATED PSA: ICD-10-CM

## 2025-04-04 LAB
BILIRUB UR QL STRIP: NEGATIVE
GLUCOSE UR QL STRIP: NEGATIVE
KETONES UR QL STRIP: NEGATIVE
LEUKOCYTE ESTERASE UR QL STRIP: NEGATIVE
PH, POC UA: 6
POC BLOOD, URINE: NEGATIVE
POC NITRATES, URINE: NEGATIVE
PROT UR QL STRIP: POSITIVE
SP GR UR STRIP: 1.02 (ref 1–1.03)
UROBILINOGEN UR STRIP-ACNC: 0.2 (ref 0.3–2.2)

## 2025-04-04 PROCEDURE — 99396 PREV VISIT EST AGE 40-64: CPT | Mod: S$GLB,,, | Performed by: INTERNAL MEDICINE

## 2025-04-04 PROCEDURE — 3008F BODY MASS INDEX DOCD: CPT | Mod: CPTII,S$GLB,, | Performed by: INTERNAL MEDICINE

## 2025-04-04 PROCEDURE — 99214 OFFICE O/P EST MOD 30 MIN: CPT | Mod: S$GLB,,, | Performed by: UROLOGY

## 2025-04-04 PROCEDURE — 73564 X-RAY EXAM KNEE 4 OR MORE: CPT | Mod: TC,50

## 2025-04-04 PROCEDURE — 99999 PR PBB SHADOW E&M-EST. PATIENT-LVL V: CPT | Mod: PBBFAC,,, | Performed by: INTERNAL MEDICINE

## 2025-04-04 PROCEDURE — 3077F SYST BP >= 140 MM HG: CPT | Mod: CPTII,S$GLB,, | Performed by: UROLOGY

## 2025-04-04 PROCEDURE — 73564 X-RAY EXAM KNEE 4 OR MORE: CPT | Mod: 26,,, | Performed by: RADIOLOGY

## 2025-04-04 PROCEDURE — 99999 PR PBB SHADOW E&M-EST. PATIENT-LVL IV: CPT | Mod: PBBFAC,,, | Performed by: UROLOGY

## 2025-04-04 PROCEDURE — 3075F SYST BP GE 130 - 139MM HG: CPT | Mod: CPTII,S$GLB,, | Performed by: INTERNAL MEDICINE

## 2025-04-04 PROCEDURE — 3008F BODY MASS INDEX DOCD: CPT | Mod: CPTII,S$GLB,, | Performed by: UROLOGY

## 2025-04-04 PROCEDURE — 99214 OFFICE O/P EST MOD 30 MIN: CPT | Mod: 25,S$GLB,, | Performed by: INTERNAL MEDICINE

## 2025-04-04 PROCEDURE — 81003 URINALYSIS AUTO W/O SCOPE: CPT | Mod: QW,S$GLB,, | Performed by: UROLOGY

## 2025-04-04 PROCEDURE — 3079F DIAST BP 80-89 MM HG: CPT | Mod: CPTII,S$GLB,, | Performed by: INTERNAL MEDICINE

## 2025-04-04 PROCEDURE — 3079F DIAST BP 80-89 MM HG: CPT | Mod: CPTII,S$GLB,, | Performed by: UROLOGY

## 2025-04-04 NOTE — PROGRESS NOTES
Chief Complaint   Patient presents with    Follow-up     Surgery Discussion       History of Present Illness:   Bradley Petty Jr. is a 64 y.o. male here for evaluation of Follow-up (Surgery Discussion)      4/4/25-63yo male presents for evaluation of urinary urgency and UUI. Pt reports that he has had urgency and UUI and is wearing a diaper now. He has tried Detrol + Myrbetriq and hasn't noticed improvement. He has also failed vesicare and flomax. States that stream is intermittent and weak. He did recently have an elevated PSA on lab work.       Review of Systems   Constitutional:  Negative for chills and fever.   Respiratory:  Negative for shortness of breath.    Cardiovascular:  Negative for chest pain.   All other systems reviewed and are negative.        Past Medical History:   Diagnosis Date    Acid reflux     Arthralgia     CKD (chronic kidney disease)     Diabetes mellitus     Glaucoma     Hypertension     Sacroiliitis 08/29/2022    Sjogren's syndrome     Sleep apnea        Past Surgical History:   Procedure Laterality Date    BACK SURGERY      EPIDURAL STEROID INJECTION INTO CERVICAL SPINE N/A 10/21/2022    Procedure: C5/6 IL ALLIE w/RN IV Sedation;  Surgeon: Tiffany Diaz MD;  Location: [facility];  Service: Pain Management;  Laterality: N/A;    INJECTION OF JOINT Right 8/29/2022    Procedure: Right sacroiliac joint + GT bursa injection with RN IV sedation;  Surgeon: Tiffany Diaz MD;  Location: [facility];  Service: Pain Management;  Laterality: Right;    SELECTIVE INJECTION OF ANESTHETIC AGENT AROUND LUMBAR SPINAL NERVE ROOT BY TRANSFORAMINAL APPROACH Bilateral 12/30/2022    Procedure: Bilateral L5/S1 TF ALLIE w/RN IV Sedation;  Surgeon: Tiffany Diaz MD;  Location: [facility];  Service: Pain Management;  Laterality: Bilateral;       Family History   Problem Relation Name Age of Onset    Diabetes Mellitus Maternal Grandmother Madear     Rheum arthritis Maternal Grandmother Madear     Cancer  Maternal Grandmother Madear     Glaucoma Maternal Grandmother Madear     Kidney disease Maternal Grandmother Madear     Cancer Mother Mother         kidney left    Hearing loss Mother Mother     Melanoma Neg Hx      Psoriasis Neg Hx      Lupus Neg Hx      Acne Neg Hx         Social History[1]    Current Medications[2]    Review of patient's allergies indicates:  No Known Allergies    Physical Exam  Vitals:    04/04/25 1121   BP: (!) 142/89   Pulse: 83   Resp: 18       General: Well-developed, well-nourished in no acute distress  HEENT: Normocephalic, atraumatic, Extraocular movements intact  Neck: supple, trachea midline, no cervical or supraclavicular lymphadenopathy  Respirations: even and unlabored  Back: midline spine, no CVA tenderness  Abdomen: soft, Non-tender, non-distended, no organomegaly or palpable masses, no rebound or guarding  Rectal: 40g prostate, no nodules or tenderness. No gross blood  Extremities: atraumatic, moves all equally, no clubbing, cyanosis or edema  Psych: normal affect  Skin: warm and dry, no lesions  Neuro: Alert and oriented, Cranial nerves II-XII grossly intact    Urinalysis  Trace protein, otherwise negative        Lab Results   Component Value Date    PSA 8.30 (H) 04/28/2025    PSA 9.95 (H) 04/02/2025    PSA 5.7 (H) 04/05/2024       Testosterone, Total   Date/Time Value Ref Range Status   04/05/2024 05:55  304 - 1227 ng/dL Final       Assessment:   1. Frequency of micturition  POCT Urinalysis, Dipstick, Automated, W/O Scope      2. Elevated PSA  MRI Prostate W W/O Contrast    Creatinine, Serum          Plan:  Frequency of micturition  -     POCT Urinalysis, Dipstick, Automated, W/O Scope    Elevated PSA  -     MRI Prostate W W/O Contrast; Future; Expected date: 04/04/2025  -     Creatinine, Serum; Future; Expected date: 04/04/2025    I had a detailed discussion with the pt regarding his elevated PSA. I would recommend further workup of that prior to considering surgical  management of his OAB. We did discuss Sacral neuromodulation and Botox. Risks/benefits of each of these were discussed in detail. Pt feels that he would also like further workup of his voiding dynamics prior to pursuing surgical management of his OAB. We discussed the utility of urodynamic studies and we will get that scheduled as well. For now, he may continue his myrbetriq + vesicare while awaiting further workup.     Follow up for Urodynamics.                         [1]   Social History  Tobacco Use    Smoking status: Never    Smokeless tobacco: Never    Tobacco comments:     Never used   Substance Use Topics    Alcohol use: Never    Drug use: Never   [2]   Current Outpatient Medications   Medication Sig Dispense Refill    acetaminophen (TYLENOL) 500 mg Cap Take 1,000 mg by mouth once daily.      azelastine (ASTELIN) 137 mcg (0.1 %) nasal spray 1 spray (137 mcg total) by Nasal route 2 (two) times daily. 30 mL 5    b complex vitamins tablet Take 1 tablet by mouth Daily.      cholecalciferol, vitamin D3, 5,000 unit capsule Take 5,000 Units by mouth Daily.      cyanocobalamin, vitamin B-12, 5,000 mcg TbDL Take 1 tablet by mouth once daily.      emtricitabine-tenofovir 200-300 mg (TRUVADA) 200-300 mg Tab TAKE 1 TABLET BY MOUTH EVERY DAY 30 tablet 4    HERBAL DRUGS (COLON HERBAL CLEANSER ORAL) Take by mouth once daily.       hydroquinone 4 % Crea Apply to affected areas of face daily for up to 2 months per course, then take at least 1 month off 30 g 1    meclizine (ANTIVERT) 25 mg tablet Take 1 tablet (25 mg total) by mouth 3 (three) times daily as needed for Dizziness. 18 tablet 0    mirabegron (MYRBETRIQ) 50 mg Tb24 Take 1 tablet (50 mg total) by mouth once daily. 30 tablet 11    multivitamin capsule Take 1 capsule by mouth Daily.      ondansetron (ZOFRAN-ODT) 4 MG TbDL Take 1 tablet (4 mg total) by mouth every 8 (eight) hours as needed (nausea). 15 tablet 0    solifenacin (VESICARE) 10 MG tablet TAKE 1 TABLET BY  MOUTH EVERY DAY 30 tablet 11    tolterodine (DETROL LA) 4 MG 24 hr capsule Take 1 capsule (4 mg total) by mouth once daily. 30 capsule 11    traZODone (DESYREL) 50 MG tablet TAKE 1 TO 2 TABLETS BY MOUTH EVERY NIGHT AT BEDTIME AS NEEDED FOR SLEEP 180 tablet 3    folic acid (FOLVITE) 1 MG tablet TAKE 1 TABLET BY MOUTH EVERY DAY 90 tablet 1    levoFLOXacin (LEVAQUIN) 500 MG tablet Take 1 po 1 hour before biopsy 1 tablet 0    methotrexate 2.5 MG Tab Take 5 tablets (12.5 mg total) by mouth every 7 days. 20 tablet 3     No current facility-administered medications for this visit.

## 2025-04-04 NOTE — TELEPHONE ENCOUNTER
----- Message from Emely sent at 4/4/2025  8:10 AM CDT -----  Contact: Bradley  Type:  Same Day AppointmentCaller is requesting a sooner appointment.  Caller declined first available appointment listed below.  Caller will not accept being placed on the waitlist and is requesting a message be sent to doctor.Name of Caller:Julissa is the first available appointment? NoneSymptoms: Arthralgia, unspecified joint [M25.50]Sjogren's syndrome, with unspecified organ involvement [M35.00] Would the patient rather a call back or a response via [facility]? Call [facility] Call Back Number: [phone] Information: Would like to be seen today if possible between the hours of 12-2 pm at the [facility].

## 2025-04-04 NOTE — PROGRESS NOTES
Occupational Therapy  Patient not seen in therapy.     Patient with other health care provider    Re-attempt plan: later today or tomorrow       OBJECTIVE                      Therapy procedure time and total treatment time can be found documented on the Time Entry flowsheet   "Subjective:      Patient ID: Bradley Petty Jr. is a 64 y.o. male.    Chief Complaint: Annual Exam    HPI  History of Present Illness             Here today for annual prev exam.  Compliant with meds without significant side effects. appetite  good.       Has appt with cards on Wednesday.   Last cards appt about one year ago.     Continues with pain right mid trapezius  Worse with movement of neck. Right rotation more than left. Worse with lying flat in bed.   No neck pain. Saw derm. Dx lipoma. Advised see surgery if removal desired  PE: tender point mid trapezius right, lipoma vs cyst.     Knee pain for 2 weeks. No trauma.   No redness or warmth. Right worse than left. Swelling right knee. Worse with movement after sitting for a while. Pain worse posterior aspect of right knee. Feels heavy. Has some popping.   PE: right knee good rom. No ttp. No redness no warmth.    Pt also c/o 3 weeks of memory difficulties.   Difficulty remembering names. Clients, family.   No problems remembering processes at work nor home.   No getting lost. No dangerous situations.     Review of Systems   Constitutional:  Negative for chills and fever.   HENT:  Negative for ear pain and sore throat.    Respiratory:  Negative for cough.    Cardiovascular:  Negative for chest pain.   Gastrointestinal:  Negative for abdominal pain and blood in stool.   Genitourinary:  Negative for dysuria and hematuria.   Neurological:  Negative for seizures and syncope.     Objective:   /86 (BP Location: Right arm, Patient Position: Sitting)   Pulse 83   Temp 98.1 °F (36.7 °C) (Oral)   Ht 5' 11" (1.803 m)   Wt 113.7 kg (250 lb 10.6 oz)   SpO2 98%   BMI 34.96 kg/m²     Physical Exam  Constitutional:       General: He is awake.      Appearance: Normal appearance.   HENT:      Head: Normocephalic and atraumatic.   Eyes:      Conjunctiva/sclera: Conjunctivae normal.   Pulmonary:      Effort: Pulmonary effort is normal.   Musculoskeletal:      Cervical " back: Normal range of motion.   Neurological:      Mental Status: He is alert. Mental status is at baseline.   Psychiatric:         Mood and Affect: Mood normal.         Behavior: Behavior normal. Behavior is cooperative.         Thought Content: Thought content normal.         Judgment: Judgment normal.         Lab Results   Component Value Date    WBC 3.65 (L) 03/29/2025    HGB 14.5 03/29/2025    HGB 14.6 01/29/2025    HGB 14.0 11/13/2024    HCT 42.3 03/29/2025    MCV 95 03/29/2025    MCV 97 01/29/2025    MCV 98 11/13/2024     03/29/2025    CHOL 191 04/04/2025    TRIG 119 04/04/2025    HDL 43 04/04/2025    LDLCALC 130.8 04/05/2024    LDLCALC 126.8 04/26/2022    LDLCALC 146.8 07/06/2017    ALT 23 04/04/2025    AST 29 04/04/2025     04/04/2025    K 4.1 04/04/2025    CALCIUM 9.4 04/04/2025     04/04/2025    CO2 24 04/04/2025    BUN 12 04/04/2025    CREATININE 1.4 04/04/2025    CREATININE 1.4 04/04/2025    CREATININE 1.3 03/29/2025    EGFRNORACEVR 56 (L) 04/04/2025    EGFRNORACEVR 56 (L) 04/04/2025    EGFRNORACEVR >60 03/29/2025    TSH 3.172 04/04/2025    TSH 2.260 04/05/2024    TSH 1.536 04/26/2022    PSA 9.95 (H) 04/02/2025    PSA 5.7 (H) 04/05/2024    PSA 3.2 04/26/2022    GLU 83 01/29/2025    HGBA1C 5.5 04/05/2024    HGBA1C 5.5 07/06/2017    HGBA1C 5.7 08/26/2015    VEYTIELD54TA 59 07/06/2017    TOTALTESTOST 493 04/05/2024    TOTALTESTOST 593 08/08/2014    BNP 15 03/29/2025          The 10-year ASCVD risk score (Marshall FONTANA, et al., 2019) is: 10.8%    Values used to calculate the score:      Age: 64 years      Sex: Male      Is Non- : Yes      Diabetic: No      Tobacco smoker: No      Systolic Blood Pressure: 138 mmHg      Is BP treated: No      HDL Cholesterol: 43 mg/dL      Total Cholesterol: 191 mg/dL     Assessment:     1. Routine general medical examination at a health care facility    2. Elevated CK    3. Knee pain, unspecified chronicity, unspecified laterality    4.  Trigger point of shoulder region, right    5. Myalgia    6. Memory difficulty    7. Arthralgia, unspecified joint      Plan:   1. Routine general medical examination at a health care facility  Overview:  Heart healthy diet and regular exercise.  Health maintenance reviewed patient    Orders:  -     CK; Future; Expected date: 04/04/2025  -     Comprehensive Metabolic Panel; Future; Expected date: 04/04/2025  -     Lipid Panel; Future; Expected date: 04/04/2025  -     Urinalysis, Reflex to Urine Culture; Future; Expected date: 04/04/2025  -     ILYA Screen w/Reflex; Future; Expected date: 04/04/2025  -     MyoMarker Panel 3; Future; Expected date: 04/04/2025    2. Elevated CK  -     Ambulatory referral/consult to Rheumatology; Future; Expected date: 04/11/2025  -     Aldolase; Future; Expected date: 04/04/2025    3. Knee pain, unspecified chronicity, unspecified laterality  -     X-Ray Knee Complete 4 Or More Views Bilat; Future; Expected date: 04/04/2025  -     Ambulatory referral/consult to Orthopedics; Future; Expected date: 04/11/2025    4. Trigger point of shoulder region, right  -     Ambulatory referral/consult to Physical Medicine Rehab; Future; Expected date: 04/11/2025    5. Myalgia  -     Ambulatory referral/consult to Rheumatology; Future; Expected date: 04/11/2025    6. Memory difficulty  -     Folate; Future; Expected date: 04/04/2025  -     TSH; Future; Expected date: 04/04/2025  -     Vitamin B12; Future; Expected date: 04/04/2025  -     Treponema Pallidium Antibodies IgG, IgM; Future; Expected date: 04/04/2025    7. Arthralgia, unspecified joint  -     C-Reactive Protein; Future; Expected date: 04/04/2025  -     Cyclic Citrullinated Peptide Antibody, IgG; Future; Expected date: 04/04/2025  -     Rheumatoid Factor; Future; Expected date: 04/04/2025  -     Sedimentation rate; Future; Expected date: 04/04/2025        There are no Patient Instructions on file for this visit.    Future Appointments   Date  Time Provider Department Center   4/28/2025  8:00 AM Christina Willingham MD [facility] UROLOGY [facility]   4/28/2025 10:20 AM [facility] US3 HG ULSOUND [facility]   5/1/2025  7:00 AM Alden Mejias MD [facility] RHEU  Medical C   5/9/2025  8:30 AM [facility] NM1 [facility] NUCLEAR [place]   5/9/2025  9:00 AM TREADMILL, [facility] CP DIAG [place]   5/9/2025 10:45 AM ECHO, INPATIENT [facility] IP ECHO [place]   5/9/2025 11:30 AM [facility] NM1 [facility] NUCLEAR [place]   5/9/2025  1:30 PM [facility], [facility] SPECCPR [facility]   6/18/2025 11:30 AM LABORATORY, [facility] ON[facility] LAB [place]   7/10/2025  9:30 AM Abdi Mast MD, [facility] INFDIS  Medical    7/23/2025  2:00 PM Carl Barajas MD [facility] CARDIO [place]       Lab Frequency Next Occurrence   Ambulatory referral/consult to Pulmonology Once 04/12/2024   Ambulatory referral/consult to Rheumatology Once 01/10/2025   MRI Prostate W W/O Contrast Once 04/04/2025   HIV 1/2 Ag/Ab (4th Gen) Every 16 weeks 6/6/2025, 9/26/2025, 1/16/2026   HIV 1/2 Ag/Ab (4th Gen) Every 16 weeks 6/6/2025, 9/26/2025, 1/16/2026, 5/8/2026   Comprehensive Metabolic Panel Every 16 weeks 6/20/2025, 10/10/2025   CBC Auto Differential Every 16 weeks 6/20/2025, 10/10/2025       Follow up in about 4 weeks (around 5/2/2025), or if symptoms worsen or fail to improve.

## 2025-04-04 NOTE — TELEPHONE ENCOUNTER
Pt wanted a sooner appointment notified him that the appointment he has scheduled is the first available. Also patient asked to be placed on the waitlist

## 2025-04-06 ENCOUNTER — RESULTS FOLLOW-UP (OUTPATIENT)
Dept: INTERNAL MEDICINE | Facility: CLINIC | Age: 65
End: 2025-04-06

## 2025-04-07 ENCOUNTER — OFFICE VISIT (OUTPATIENT)
Dept: PAIN MEDICINE | Facility: CLINIC | Age: 65
End: 2025-04-07
Payer: COMMERCIAL

## 2025-04-07 ENCOUNTER — TELEPHONE (OUTPATIENT)
Dept: RHEUMATOLOGY | Facility: CLINIC | Age: 65
End: 2025-04-07
Payer: COMMERCIAL

## 2025-04-07 VITALS
DIASTOLIC BLOOD PRESSURE: 99 MMHG | BODY MASS INDEX: 35.73 KG/M2 | SYSTOLIC BLOOD PRESSURE: 158 MMHG | HEIGHT: 71 IN | WEIGHT: 255.19 LBS | RESPIRATION RATE: 17 BRPM | HEART RATE: 84 BPM

## 2025-04-07 DIAGNOSIS — D17.9 LIPOMA, UNSPECIFIED SITE: Primary | ICD-10-CM

## 2025-04-07 DIAGNOSIS — Z87.39 HISTORY OF RHABDOMYOLYSIS: ICD-10-CM

## 2025-04-07 PROCEDURE — 99999 PR PBB SHADOW E&M-EST. PATIENT-LVL IV: CPT | Mod: PBBFAC,,, | Performed by: PHYSICAL MEDICINE & REHABILITATION

## 2025-04-07 NOTE — TELEPHONE ENCOUNTER
----- Message from [employer] sent at 4/7/2025  8:52 AM CDT -----  Contact: Bradley  Type:  Sooner Apoointment RequestCaller is requesting a sooner appointment.  Caller declined first available appointment listed below.  Caller will not accept being placed on the waitlist and is requesting a message be sent to doctor.Name of Caller: BradleyRufuseverardo is the first available appointment? Already Scheduled for June 18thSymptoms: Arthralgia, unspecified joint [M25.50]Sjogren's syndrome, with unspecified organ involvement [M35.00] Would the patient rather a call back or a response via [facility]? Call [facility] Call Back Number: [phone] Information: Pt will take anything sooner, [place] of [place] June 18[place]. Please call to set up another date and time.

## 2025-04-08 ENCOUNTER — TELEPHONE (OUTPATIENT)
Dept: RHEUMATOLOGY | Facility: CLINIC | Age: 65
End: 2025-04-08
Payer: COMMERCIAL

## 2025-04-08 ENCOUNTER — OFFICE VISIT (OUTPATIENT)
Dept: ORTHOPEDICS | Facility: CLINIC | Age: 65
End: 2025-04-08
Payer: COMMERCIAL

## 2025-04-08 VITALS — BODY MASS INDEX: 35.57 KG/M2 | WEIGHT: 255 LBS

## 2025-04-08 DIAGNOSIS — M25.569 KNEE PAIN, UNSPECIFIED CHRONICITY, UNSPECIFIED LATERALITY: ICD-10-CM

## 2025-04-08 DIAGNOSIS — M25.562 ACUTE PAIN OF LEFT KNEE: ICD-10-CM

## 2025-04-08 DIAGNOSIS — M79.10 MYALGIA: Primary | ICD-10-CM

## 2025-04-08 DIAGNOSIS — M25.561 ACUTE PAIN OF RIGHT KNEE: ICD-10-CM

## 2025-04-08 PROCEDURE — 99203 OFFICE O/P NEW LOW 30 MIN: CPT | Mod: S$GLB,,, | Performed by: ORTHOPAEDIC SURGERY

## 2025-04-08 PROCEDURE — 3008F BODY MASS INDEX DOCD: CPT | Mod: CPTII,S$GLB,, | Performed by: ORTHOPAEDIC SURGERY

## 2025-04-08 PROCEDURE — 1159F MED LIST DOCD IN RCRD: CPT | Mod: CPTII,S$GLB,, | Performed by: ORTHOPAEDIC SURGERY

## 2025-04-08 PROCEDURE — 99999 PR PBB SHADOW E&M-EST. PATIENT-LVL III: CPT | Mod: PBBFAC,,, | Performed by: ORTHOPAEDIC SURGERY

## 2025-04-08 RX ORDER — METHYLPREDNISOLONE 4 MG/1
TABLET ORAL
Qty: 21 EACH | Refills: 0 | Status: SHIPPED | OUTPATIENT
Start: 2025-04-08 | End: 2025-04-29

## 2025-04-08 NOTE — PATIENT INSTRUCTIONS
Encounter Diagnoses   Name Primary?    Myalgia Yes    Acute pain of right knee     Acute pain of left knee     Knee pain, unspecified chronicity, unspecified laterality        ASSESSMENT:  64-year-old male who works as a  at [employer] with diffuse muscle pains   Bilateral knee pain with right knee swelling    TREATMENT/PLAN:   Complete lab work as ordered by PCP  Follow up with rheumatologist as referred by PCP  Ordered Medrol Dosepak  Follow up with us as needed  I do not think the patient has a primary orthopedic diagnosis for his right knee pain and swelling.  Differential diagnosis most likely includes an inflammatory arthropathy.  Further evaluation and recommendations per Rheumatology.

## 2025-04-08 NOTE — PROGRESS NOTES
Amado Garcias MD  Orthopedic Surgery   [address] [place] [address]       Name: Bradley Petty Jr.  MRN: [id number]  Date: 4/8/2025      Patient ID: Bradley Petty Jr. is a 64 y.o. male.     Chief Complaint: Pain of the Left Knee (7/10 behind the knee ) and Pain of the Right Knee (7/10 behind the knee )      Patient Instructions     Encounter Diagnoses   Name Primary?    Myalgia Yes    Acute pain of right knee     Acute pain of left knee     Knee pain, unspecified chronicity, unspecified laterality        ASSESSMENT:  64-year-old male who works as a  at [employer] with diffuse muscle pains   Bilateral knee pain with right knee swelling    TREATMENT/PLAN:   Complete lab work as ordered by PCP  Follow up with rheumatologist as referred by PCP  Ordered Medrol Dosepak  Follow up with us as needed  I do not think the patient has a primary orthopedic diagnosis for his right knee pain and swelling.  Differential diagnosis most likely includes an inflammatory arthropathy.  Further evaluation and recommendations per Rheumatology.    HPI: Bradley Petty Jr. is a 64 y.o. male.Patient presents today for evaluation of bilateral knee pain and diffuse muscle pains.  He reports that his symptoms started 3 weeks ago, when he felt dizzy and actually passed out.  He was then evaluated for this in the ER and had diffuse muscle pains at that time.  He was discharged and followed up with his PCP and underwent extensive lab testing, which is still pending some results.  He was also referred to Rheumatology for a workup as well.  As for his knees, he notes pain primarily in the right knee and thigh with some right knee swelling.       XRAY:  X-rays 04/04/2025 [facility] were reviewed as well as her report.  Minor peripheral arthritic spurring of the bilateral knees with a good joint space preservation and overall good alignment of the knee joints and patella.  No acute bony findings.      PHYSICAL  EXAMINATION:  Body mass index is 35.57 kg/m².,     GENERAL:   Pleasant and cooperative with normal work of breathing    EXTREMITY:   Bilateral knees nontender  Full range of motion  Suprapatellar fullness of the right knee  No rashes, wounds, erythema   Neurovascular status grossly intact   Full strength grossly  Calf soft bilat      MEDICATIONS: Current Medications[1]    ALLERGIES:   Review of patient's allergies indicates:  No Known Allergies    MEDICAL HISTORY:   Past Medical History:   Diagnosis Date    Acid reflux     Arthralgia     CKD (chronic kidney disease)     Diabetes mellitus     Glaucoma     Hypertension     Sacroiliitis 08/29/2022    Sjogren's syndrome     Sleep apnea        SURGICAL HISTORY:   Past Surgical History:   Procedure Laterality Date    BACK SURGERY      EPIDURAL STEROID INJECTION INTO CERVICAL SPINE N/A 10/21/2022    Procedure: C5/6 IL ALLIE w/RN IV Sedation;  Surgeon: Tiffany Diaz MD;  Location: [facility] PAIN [facility]GT;  Service: Pain Management;  Laterality: N/A;    INJECTION OF JOINT Right 8/29/2022    Procedure: Right sacroiliac joint + GT bursa injection with RN IV sedation;  Surgeon: Tiffany Diaz MD;  Location: [facility] PAIN [facility]GT;  Service: Pain Management;  Laterality: Right;    SELECTIVE INJECTION OF ANESTHETIC AGENT AROUND LUMBAR SPINAL NERVE ROOT BY TRANSFORAMINAL APPROACH Bilateral 12/30/2022    Procedure: Bilateral L5/S1 TF ALLIE w/RN IV Sedation;  Surgeon: Tiffany Diaz MD;  Location: [facility] PAIN [facility]GT;  Service: Pain Management;  Laterality: Bilateral;       REVIEW OF SYSTEMS:   No fevers, chills, sweats, chest pain or shortness of breath.      SOCIAL HISTORY:   Social History     Occupational History     Employer: [employer]   Tobacco Use    Smoking status: Never    Smokeless tobacco: Never    Tobacco comments:     Never used   Substance and Sexual Activity    Alcohol use: Never    Drug use: Never    Sexual activity: Not Currently     Partners: Female     Birth control/protection: None        Patient Type: New Patient  Visit Type: (CPT 24960 - New 30-44 min)     Thirty-five minute patient encounter  This includes face to face time and non-face to face time preparing to see the patient (eg, review of tests),   obtaining and/or reviewing separately obtained history, documenting clinical information in the electronic or other health record, independently interpreting results   and communicating results to the patient/family/caregiver, or care coordinator.       DISCLAIMER: This note was prepared with [employer] voice recognition transcription software. Garbled syntax, mangled pronouns, and other bizarre constructions may be attributed to that software system.          Amado Garcias M.D.  Orthopedic Surgeon  [facility]             [1]   Current Outpatient Medications:     acetaminophen (TYLENOL) 500 mg Cap, Take 1,000 mg by mouth once daily., Disp: , Rfl:     azelastine (ASTELIN) 137 mcg (0.1 %) nasal spray, 1 spray (137 mcg total) by Nasal route 2 (two) times daily., Disp: 30 mL, Rfl: 5    b complex vitamins tablet, Take 1 tablet by mouth Daily., Disp: , Rfl:     cholecalciferol, vitamin D3, 5,000 unit capsule, Take 5,000 Units by mouth Daily., Disp: , Rfl:     cyanocobalamin, vitamin B-12, 5,000 mcg TbDL, Take 1 tablet by mouth once daily., Disp: , Rfl:     emtricitabine-tenofovir 200-300 mg (TRUVADA) 200-300 mg Tab, TAKE 1 TABLET BY MOUTH EVERY DAY, Disp: 30 tablet, Rfl: 4    HERBAL DRUGS (COLON HERBAL CLEANSER ORAL), Take by mouth once daily. , Disp: , Rfl:     hydroquinone 4 % Crea, Apply to affected areas of face daily for up to 2 months per course, then take at least 1 month off, Disp: 30 g, Rfl: 1    meclizine (ANTIVERT) 25 mg tablet, Take 1 tablet (25 mg total) by mouth 3 (three) times daily as needed for Dizziness., Disp: 18 tablet, Rfl: 0    mirabegron (MYRBETRIQ) 50 mg Tb24, Take 1 tablet (50 mg total) by mouth once daily., Disp: 30 tablet, Rfl: 11    multivitamin capsule,  Take 1 capsule by mouth Daily., Disp: , Rfl:     ondansetron (ZOFRAN-ODT) 4 MG TbDL, Take 1 tablet (4 mg total) by mouth every 8 (eight) hours as needed (nausea)., Disp: 15 tablet, Rfl: 0    solifenacin (VESICARE) 10 MG tablet, TAKE 1 TABLET BY MOUTH EVERY DAY, Disp: 30 tablet, Rfl: 11    tolterodine (DETROL LA) 4 MG 24 hr capsule, Take 1 capsule (4 mg total) by mouth once daily., Disp: 30 capsule, Rfl: 11    traZODone (DESYREL) 50 MG tablet, TAKE 1 TO 2 TABLETS BY MOUTH EVERY NIGHT AT BEDTIME AS NEEDED FOR SLEEP, Disp: 180 tablet, Rfl: 3    methylPREDNISolone (MEDROL DOSEPACK) 4 mg tablet, use as directed, Disp: 21 each, Rfl: 0

## 2025-04-08 NOTE — TELEPHONE ENCOUNTER
Spoke with patient . Informed him that I have nothing to offer before 5-1. Will place on cancellation list. Verbalized understanding

## 2025-04-08 NOTE — TELEPHONE ENCOUNTER
----- Message from [employer] sent at 4/8/2025  2:16 PM CDT -----  Contact: self  .Type:  Sooner Apoointment RequestCaller is requesting a sooner appointment.  Caller declined first available appointment listed below.  Caller will not accept being placed on the waitlist and is requesting a message be sent to doctor.Name of Caller:.Bradley Petty Jr.When is the first available appointment?Symptoms:Would the patient rather a call back or a response via [facility]? Call [facility] Call Back Number:.[phone] Information: Pt states he saw dr arevalo who wants him to see rheumatology, wants a call back to see if he can be seen any sooner than his appt on 5/1.

## 2025-04-09 ENCOUNTER — RESULTS FOLLOW-UP (OUTPATIENT)
Dept: PAIN MEDICINE | Facility: CLINIC | Age: 65
End: 2025-04-09

## 2025-04-09 ENCOUNTER — TELEPHONE (OUTPATIENT)
Facility: CLINIC | Age: 65
End: 2025-04-09
Payer: COMMERCIAL

## 2025-04-09 ENCOUNTER — OFFICE VISIT (OUTPATIENT)
Facility: CLINIC | Age: 65
End: 2025-04-09
Payer: COMMERCIAL

## 2025-04-09 ENCOUNTER — HOSPITAL ENCOUNTER (OUTPATIENT)
Dept: RADIOLOGY | Facility: HOSPITAL | Age: 65
Discharge: HOME | End: 2025-04-09
Attending: PHYSICAL MEDICINE & REHABILITATION
Payer: COMMERCIAL

## 2025-04-09 ENCOUNTER — OFFICE VISIT (OUTPATIENT)
Dept: CARDIOLOGY | Facility: CLINIC | Age: 65
End: 2025-04-09
Payer: COMMERCIAL

## 2025-04-09 ENCOUNTER — HOSPITAL ENCOUNTER (OUTPATIENT)
Dept: RADIOLOGY | Facility: HOSPITAL | Age: 65
Discharge: HOME | End: 2025-04-09
Attending: UROLOGY
Payer: COMMERCIAL

## 2025-04-09 ENCOUNTER — PATIENT MESSAGE (OUTPATIENT)
Dept: CARDIOLOGY | Facility: HOSPITAL | Age: 65
End: 2025-04-09
Payer: COMMERCIAL

## 2025-04-09 VITALS
OXYGEN SATURATION: 97 % | WEIGHT: 250.25 LBS | BODY MASS INDEX: 33.89 KG/M2 | DIASTOLIC BLOOD PRESSURE: 88 MMHG | SYSTOLIC BLOOD PRESSURE: 138 MMHG | HEIGHT: 72 IN | HEART RATE: 77 BPM

## 2025-04-09 DIAGNOSIS — G47.33 OSA ON CPAP: ICD-10-CM

## 2025-04-09 DIAGNOSIS — I49.9 IRREGULAR HEART BEAT: ICD-10-CM

## 2025-04-09 DIAGNOSIS — R03.0 ELEVATED BLOOD PRESSURE READING: ICD-10-CM

## 2025-04-09 DIAGNOSIS — R42 DIZZINESS: ICD-10-CM

## 2025-04-09 DIAGNOSIS — E66.01 SEVERE OBESITY (BMI 35.0-39.9) WITH COMORBIDITY: Primary | ICD-10-CM

## 2025-04-09 DIAGNOSIS — R42 VERTIGO: ICD-10-CM

## 2025-04-09 DIAGNOSIS — M25.50 POLYARTHRALGIA: Primary | ICD-10-CM

## 2025-04-09 DIAGNOSIS — R97.20 ELEVATED PSA: ICD-10-CM

## 2025-04-09 DIAGNOSIS — R55 SYNCOPE AND COLLAPSE: ICD-10-CM

## 2025-04-09 DIAGNOSIS — M25.561 ACUTE PAIN OF RIGHT KNEE: ICD-10-CM

## 2025-04-09 DIAGNOSIS — M25.511 TRIGGER POINT OF SHOULDER REGION, RIGHT: ICD-10-CM

## 2025-04-09 DIAGNOSIS — D17.9 LIPOMA, UNSPECIFIED SITE: ICD-10-CM

## 2025-04-09 PROCEDURE — 25500020 PHARM REV CODE 255: Mod: PN | Performed by: UROLOGY

## 2025-04-09 PROCEDURE — 99999 PR PBB SHADOW E&M-EST. PATIENT-LVL IV: CPT | Mod: PBBFAC,,, | Performed by: INTERNAL MEDICINE

## 2025-04-09 PROCEDURE — 99205 OFFICE O/P NEW HI 60 MIN: CPT | Mod: S$GLB,,, | Performed by: INTERNAL MEDICINE

## 2025-04-09 PROCEDURE — 1160F RVW MEDS BY RX/DR IN RCRD: CPT | Mod: CPTII,S$GLB,, | Performed by: STUDENT IN AN ORGANIZED HEALTH CARE EDUCATION/TRAINING PROGRAM

## 2025-04-09 PROCEDURE — 3079F DIAST BP 80-89 MM HG: CPT | Mod: CPTII,S$GLB,, | Performed by: INTERNAL MEDICINE

## 2025-04-09 PROCEDURE — 99999 PR PBB SHADOW E&M-EST. PATIENT-LVL IV: CPT | Mod: PBBFAC,,, | Performed by: STUDENT IN AN ORGANIZED HEALTH CARE EDUCATION/TRAINING PROGRAM

## 2025-04-09 PROCEDURE — 72197 MRI PELVIS W/O & W/DYE: CPT | Mod: TC,PN

## 2025-04-09 PROCEDURE — 76882 US LMTD JT/FCL EVL NVASC XTR: CPT | Mod: TC,PN,RT

## 2025-04-09 PROCEDURE — A9585 GADOBUTROL INJECTION: HCPCS | Mod: PN | Performed by: UROLOGY

## 2025-04-09 PROCEDURE — 3075F SYST BP GE 130 - 139MM HG: CPT | Mod: CPTII,S$GLB,, | Performed by: INTERNAL MEDICINE

## 2025-04-09 PROCEDURE — 76882 US LMTD JT/FCL EVL NVASC XTR: CPT | Mod: 26,RT,, | Performed by: RADIOLOGY

## 2025-04-09 PROCEDURE — 3008F BODY MASS INDEX DOCD: CPT | Mod: CPTII,S$GLB,, | Performed by: INTERNAL MEDICINE

## 2025-04-09 PROCEDURE — 99204 OFFICE O/P NEW MOD 45 MIN: CPT | Mod: S$GLB,,, | Performed by: STUDENT IN AN ORGANIZED HEALTH CARE EDUCATION/TRAINING PROGRAM

## 2025-04-09 PROCEDURE — 1159F MED LIST DOCD IN RCRD: CPT | Mod: CPTII,S$GLB,, | Performed by: STUDENT IN AN ORGANIZED HEALTH CARE EDUCATION/TRAINING PROGRAM

## 2025-04-09 PROCEDURE — 72197 MRI PELVIS W/O & W/DYE: CPT | Mod: 26,,, | Performed by: RADIOLOGY

## 2025-04-09 RX ORDER — GADOBUTROL 604.72 MG/ML
10 INJECTION INTRAVENOUS
Status: COMPLETED | OUTPATIENT
Start: 2025-04-09 | End: 2025-04-09

## 2025-04-09 RX ADMIN — GADOBUTROL 10 ML: 604.72 INJECTION INTRAVENOUS at 10:04

## 2025-04-09 NOTE — PROGRESS NOTES
Subjective:   Patient ID:  Bradley Petty Jr. is a 64 y.o. male who presents for follow-up of Dizziness, chest pain, Loss of Consciousness, and Hypertension  Pt dizziness , syncope ( 1 episode) with elevated BP. Pt did go to urgent care and ER. Pt with rare CP.  Dizziness 1.5 months. CP started 8 months ago    CRF- male, weight,lack of exercise    Dizziness:   Chronicity:  New  Onset:  More than 1 month ago  Progression since onset:  Waxing and waning  Frequency:  Every few days  Severity:  Moderate  Duration:  Very brief  Dizziness characteristics:  Off-balance  Frequency of Spells:  Weekly   Associated symptoms: light-headedness, syncope and chest pain.no palpitations.  Aggravated by:  Nothing  Treatments tried:  Rest  Improvements on treatment:  Mild  Loss of Consciousness  This is a new problem. The current episode started more than 1 month ago. The problem occurs rarely. The problem has been waxing and waning. He lost consciousness for a period of less than 1 minute. Associated symptoms include chest pain, dizziness and light-headedness. Pertinent negatives include no palpitations. He has tried nothing for the symptoms. The treatment provided mild relief.   Hypertension  This is a new problem. The current episode started more than 1 month ago. The problem has been gradually improving since onset. Associated symptoms include chest pain. Pertinent negatives include no palpitations or shortness of breath. There are no associated agents to hypertension. Risk factors for coronary artery disease include male gender and obesity. Past treatments include nothing. There are no compliance problems.  Identifiable causes of hypertension include sleep apnea.     EKG 3-29  Normal sinus rhythm   Septal infarct (cited on or before 29-Mar-2025)   Abnormal ECG   When compared with ECG of 29-Mar-2025 11:04,   No significant change was found     Review of Systems   Constitutional: Negative. Negative for weight gain.   HENT:  Negative.     Eyes: Negative.    Cardiovascular:  Positive for chest pain and syncope. Negative for leg swelling and palpitations.   Respiratory: Negative.  Negative for shortness of breath.    Endocrine: Negative.    Hematologic/Lymphatic: Negative.    Skin: Negative.    Musculoskeletal:  Negative for muscle weakness.   Gastrointestinal: Negative.    Genitourinary: Negative.    Neurological:  Positive for dizziness and light-headedness.   Psychiatric/Behavioral: Negative.     Allergic/Immunologic: Negative.    All other systems reviewed and are negative.    Family History   Problem Relation Name Age of Onset    Diabetes Mellitus Maternal Grandmother Madear     Rheum arthritis Maternal Grandmother Madear     Cancer Maternal Grandmother Madear     Glaucoma Maternal Grandmother Madear     Kidney disease Maternal Grandmother Madear     Cancer Mother Mother         kidney left    Hearing loss Mother Mother     Melanoma Neg Hx      Psoriasis Neg Hx      Lupus Neg Hx      Acne Neg Hx       Past Medical History:   Diagnosis Date    Acid reflux     Arthralgia     CKD (chronic kidney disease)     Diabetes mellitus     Glaucoma     Hypertension     Sacroiliitis 08/29/2022    Sjogren's syndrome     Sleep apnea      Social History[1]  Medications Ordered Prior to Encounter[2]  Review of patient's allergies indicates:  No Known Allergies    Objective:     Physical Exam  Vitals and nursing note reviewed.   Constitutional:       Appearance: He is well-developed.   HENT:      Head: Normocephalic and atraumatic.   Eyes:      Conjunctiva/sclera: Conjunctivae normal.      Pupils: Pupils are equal, round, and reactive to light.   Cardiovascular:      Rate and Rhythm: Normal rate and regular rhythm.      Pulses: Intact distal pulses.      Heart sounds: Normal heart sounds.   Pulmonary:      Effort: Pulmonary effort is normal.      Breath sounds: Normal breath sounds.   Abdominal:      General: Bowel sounds are normal.      Palpations:  Abdomen is soft.   Musculoskeletal:      Cervical back: Normal range of motion and neck supple.   Skin:     General: Skin is warm and dry.   Neurological:      Mental Status: He is alert and oriented to person, place, and time.         Assessment:     1. Severe obesity (BMI 35.0-39.9) with comorbidity    2. DA on CPAP    3. Irregular heart beat    4. Elevated blood pressure reading    5. Syncope and collapse    6. Dizziness        Plan:     Severe obesity (BMI 35.0-39.9) with comorbidity    DA on CPAP    Irregular heart beat    Elevated blood pressure reading    Syncope and collapse    Dizziness      Echo, stress test,  cardiac monitor         [1]   Social History  Socioeconomic History    Marital status: Single   Occupational History     Employer: [employer]   Tobacco Use    Smoking status: Never    Smokeless tobacco: Never    Tobacco comments:     Never used   Substance and Sexual Activity    Alcohol use: Never    Drug use: Never    Sexual activity: Not Currently     Partners: Female     Birth control/protection: None     Social Drivers of Health     Financial Resource Strain: Low Risk  (3/19/2025)    Overall Financial Resource Strain (CARDIA)     Difficulty of Paying Living Expenses: Not hard at all   Food Insecurity: No Food Insecurity (3/19/2025)    Hunger Vital Sign     Worried About Running Out of Food in the Last Year: Never true     Ran Out of Food in the Last Year: Never true   Transportation Needs: No Transportation Needs (3/19/2025)    PRAPARE - Transportation     Lack of Transportation (Medical): No     Lack of Transportation (Non-Medical): No   Physical Activity: Sufficiently Active (3/19/2025)    Exercise Vital Sign     Days of Exercise per Week: 2 days     Minutes of Exercise per Session: 90 min   Stress: Stress Concern Present (3/19/2025)    [employer] [place] [employer] - Occupational Stress Questionnaire     Feeling of Stress : Very much   Housing Stability: Low Risk  (3/19/2025)     Housing Stability Vital Sign     Unable to Pay for Housing in the Last Year: No     Homeless in the Last Year: No   [2]   Current Outpatient Medications on File Prior to Visit   Medication Sig Dispense Refill    acetaminophen (TYLENOL) 500 mg Cap Take 1,000 mg by mouth once daily.      azelastine (ASTELIN) 137 mcg (0.1 %) nasal spray 1 spray (137 mcg total) by Nasal route 2 (two) times daily. 30 mL 5    b complex vitamins tablet Take 1 tablet by mouth Daily.      cholecalciferol, vitamin D3, 5,000 unit capsule Take 5,000 Units by mouth Daily.      cyanocobalamin, vitamin B-12, 5,000 mcg TbDL Take 1 tablet by mouth once daily.      emtricitabine-tenofovir 200-300 mg (TRUVADA) 200-300 mg Tab TAKE 1 TABLET BY MOUTH EVERY DAY 30 tablet 4    HERBAL DRUGS (COLON HERBAL CLEANSER ORAL) Take by mouth once daily.       hydroquinone 4 % Crea Apply to affected areas of face daily for up to 2 months per course, then take at least 1 month off 30 g 1    meclizine (ANTIVERT) 25 mg tablet Take 1 tablet (25 mg total) by mouth 3 (three) times daily as needed for Dizziness. 18 tablet 0    methylPREDNISolone (MEDROL DOSEPACK) 4 mg tablet use as directed 21 each 0    mirabegron (MYRBETRIQ) 50 mg Tb24 Take 1 tablet (50 mg total) by mouth once daily. 30 tablet 11    multivitamin capsule Take 1 capsule by mouth Daily.      ondansetron (ZOFRAN-ODT) 4 MG TbDL Take 1 tablet (4 mg total) by mouth every 8 (eight) hours as needed (nausea). 15 tablet 0    solifenacin (VESICARE) 10 MG tablet TAKE 1 TABLET BY MOUTH EVERY DAY 30 tablet 11    tolterodine (DETROL LA) 4 MG 24 hr capsule Take 1 capsule (4 mg total) by mouth once daily. 30 capsule 11    traZODone (DESYREL) 50 MG tablet TAKE 1 TO 2 TABLETS BY MOUTH EVERY NIGHT AT BEDTIME AS NEEDED FOR SLEEP 180 tablet 3     No current facility-administered medications on file prior to visit.

## 2025-04-09 NOTE — PROGRESS NOTES
"PMR New Patient History and Physical    Referred by: Jesse Barnes MD    04/09/2025    Subjective  Bradley Petty Jr. is a 64 y.o. male who presents with complaint of R sided neck and shoulder pain. Also with recent hx of hypertensive episode and dizziness managed in the ER. Of note, was seen by Dr Gill recently and found to have lipoma in the R shoulder near his region of pain.     Onset/ Duration: 1 months ago with pain  Progression: worsening since "dizzy episode"  Pain described as: knot, over the shoulder  Severity: 8/10, constant  Location: upper traps, periscapular  Radiation: none  Aggravators: lying down at night  Relievers: sitting upright  Associated Symptoms: imbalance since March 28th, sudden onset  Numbness/ tingling: no  Weakness: no    Treatments tried and response  Supervised therapy: no    Medications: none      Injections: no, plans for follow up with Dr Gill    Relevant functional activity history: works as  for [employer] and .      Past Medical History:   Diagnosis Date    Acid reflux     Arthralgia     CKD (chronic kidney disease)     Diabetes mellitus     Glaucoma     Hypertension     Sacroiliitis 08/29/2022    Sjogren's syndrome     Sleep apnea        Past Surgical History:   Procedure Laterality Date    BACK SURGERY      EPIDURAL STEROID INJECTION INTO CERVICAL SPINE N/A 10/21/2022    Procedure: C5/6 IL ALLIE w/RN IV Sedation;  Surgeon: Tiffany Diaz MD;  [place] [facility] [place]  Service: Pain Management;  Laterality: N/A;    INJECTION OF JOINT Right 8/29/2022    Procedure: Right sacroiliac joint + GT bursa injection with RN IV sedation;  Surgeon: Tiffany Diaz MD;  Location: [facility];  Service: Pain Management;  Laterality: Right;    SELECTIVE INJECTION OF ANESTHETIC AGENT AROUND LUMBAR SPINAL NERVE ROOT BY TRANSFORAMINAL APPROACH Bilateral 12/30/2022    Procedure: Bilateral L5/S1 TF ALLIE w/RN IV Sedation;  Surgeon: Tiffany Diaz MD;  Location: " "[facility];  Service: Pain Management;  Laterality: Bilateral;       Current Medications[1]    Review of patient's allergies indicates:  No Known Allergies    Social History     Socioeconomic History    Marital status: Single   Occupational History     Employer: [employer]   Tobacco Use    Smoking status: Never    Smokeless tobacco: Never    Tobacco comments:     Never used   Substance and Sexual Activity    Alcohol use: Never    Drug use: Never    Sexual activity: Not Currently     Partners: Female     Birth control/protection: None     Social Drivers of Health     Financial Resource Strain: Low Risk  (3/19/2025)    Overall Financial Resource Strain (CARDIA)     Difficulty of Paying Living Expenses: Not hard at all   Food Insecurity: No Food Insecurity (3/19/2025)    Hunger Vital Sign     Worried About Running Out of Food in the Last Year: Never true     Ran Out of Food in the Last Year: Never true   Transportation Needs: No Transportation Needs (3/19/2025)    PRAPARE - Transportation     Lack of Transportation (Medical): No     Lack of Transportation (Non-Medical): No   Physical Activity: Sufficiently Active (3/19/2025)    Exercise Vital Sign     Days of Exercise per Week: 2 days     Minutes of Exercise per Session: 90 min   Stress: Stress Concern Present (3/19/2025)    [employer] [place] [employer] - Occupational Stress Questionnaire     Feeling of Stress : Very much   Housing Stability: Low Risk  (3/19/2025)    Housing Stability Vital Sign     Unable to Pay for Housing in the Last Year: No     Homeless in the Last Year: No       Family history reviewed.     Review of Systems:  General: denies fever  Bowel / bladder: denies lack of bowel or bladder sensation   MSK: + R knee pain  Other: + blurry vision last 2 weeks, dizziness (poor balance), Had "dizzy" episode last week with hypertension    Objective  There were no vitals filed for this visit.    General: NAD, cooperative to exam  HEENT:  " Normocephalic and atraumatic  Respiratory:  Nonlabored breathing, no respiratory distress  Cardiovascular:  No cyanosis or edema  Abdomen: benign  Skin: no lacerations or abrasions to exposed areas  Neurologic Exam: awake, alert and appropriate      Motor:   Upper Extremity Right Left   Shoulder abduction 5 5   Elbow flexion 5 5   Elbow extension 5 5   Finger flexion 5 5   Finger abduction 5 5        Lower Extremity Right Left   Hip flexion 5 5   Knee extension 5 5   Ankle dorsiflexion 5 5   Long toe extension 5 5   Ankle plantarflexion 5 5     Tone: normal  Bulk: normal without diffuse atrophy or hypertrophy  Involuntary movements: none    Reflexes symmetric throughout the upper extremity    MSK:  Inspection: no redness or erythema to the neck. Some swelling to the R knee. No redness or warmth  Palpation: + soft mobile mass in the R shoulder presumed lipoma, non-tender  ROM: grossly normal neck and shoulder ROM  Special tests: negative Neer's, nonpainful arc R shoulder, negative Spurling;s. Normal cerebellar testing (JAZMIN, Romberg's, heel to shin)    Imaging:  Prior imaging: CTA head and neck from 3/29/2025  Impression:     1.  Negative for acute intracranial process.  Negative for hemorrhage or skull fracture.     2.  Negative for significant stenosis or occlusion involving the intracranial or neck vasculature.  Negative for aneurysm or dissection.     3.  Cerebral atrophy noted.  Intracranial atherosclerotic disease noted.  Small vessel ischemic changes noted.     4.  Nonemergent findings as described above.    Today: Doppler US RLE: pending    Procedure:  No procedure performed during this visit.     1. Polyarthralgia  -     Ambulatory referral/consult to Rheumatology; Future; Expected date: 04/16/2025    2. Trigger point of shoulder region, right  -     Ambulatory referral/consult to Physical Medicine Rehab    3. Acute pain of right knee  -     US Lower Extremity Veins Right; Future; Expected date:  04/09/2025          Assessment and Plan:  Bradley Petty Jr. is a 64 y.o. male with recent hx of dizziness (imbalance) , HTN, hyperCKemia, polyarthralgia (shoulder, knees), R shoulder lipoma, who presents to clinic today for evaluation and management of above symptoms.     # R shoulder pain  - well localized, discussed continued follow up w/ Dr Gill if no improvement in coming weeks.     # polyarthralgia with recent hyperCKemia  - discussed follow up with rheumatology, has appt coming up with Dr Mejias  - can perform EMG in our clinic if indicated    # R knee pain and swelling  - doppler US to assess for DVT, results pending  - follow up with ortho as needed    # Vertigo  - Discussed CTA report, no findings suggestive of vertebrobasilar insufficiency. Consider neurologic referral if no improvement in coming weeks.   - Offered vestibular therapy referral, will hold off for now while being worked up for above.     Follow up as needed    Samuel Campos,   04/09/2025  [facility]                 [1]   Current Outpatient Medications:     acetaminophen (TYLENOL) 500 mg Cap, Take 1,000 mg by mouth once daily., Disp: , Rfl:     azelastine (ASTELIN) 137 mcg (0.1 %) nasal spray, 1 spray (137 mcg total) by Nasal route 2 (two) times daily., Disp: 30 mL, Rfl: 5    b complex vitamins tablet, Take 1 tablet by mouth Daily., Disp: , Rfl:     cholecalciferol, vitamin D3, 5,000 unit capsule, Take 5,000 Units by mouth Daily., Disp: , Rfl:     cyanocobalamin, vitamin B-12, 5,000 mcg TbDL, Take 1 tablet by mouth once daily., Disp: , Rfl:     emtricitabine-tenofovir 200-300 mg (TRUVADA) 200-300 mg Tab, TAKE 1 TABLET BY MOUTH EVERY DAY, Disp: 30 tablet, Rfl: 4    HERBAL DRUGS (COLON HERBAL CLEANSER ORAL), Take by mouth once daily. , Disp: , Rfl:     hydroquinone 4 % Crea, Apply to affected areas of face daily for up to 2 months per course, then take at least 1 month off, Disp: 30 g, Rfl: 1    meclizine (ANTIVERT) 25 mg tablet, Take  1 tablet (25 mg total) by mouth 3 (three) times daily as needed for Dizziness., Disp: 18 tablet, Rfl: 0    methylPREDNISolone (MEDROL DOSEPACK) 4 mg tablet, use as directed, Disp: 21 each, Rfl: 0    mirabegron (MYRBETRIQ) 50 mg Tb24, Take 1 tablet (50 mg total) by mouth once daily., Disp: 30 tablet, Rfl: 11    multivitamin capsule, Take 1 capsule by mouth Daily., Disp: , Rfl:     ondansetron (ZOFRAN-ODT) 4 MG TbDL, Take 1 tablet (4 mg total) by mouth every 8 (eight) hours as needed (nausea)., Disp: 15 tablet, Rfl: 0    solifenacin (VESICARE) 10 MG tablet, TAKE 1 TABLET BY MOUTH EVERY DAY, Disp: 30 tablet, Rfl: 11    tolterodine (DETROL LA) 4 MG 24 hr capsule, Take 1 capsule (4 mg total) by mouth once daily., Disp: 30 capsule, Rfl: 11    traZODone (DESYREL) 50 MG tablet, TAKE 1 TO 2 TABLETS BY MOUTH EVERY NIGHT AT BEDTIME AS NEEDED FOR SLEEP, Disp: 180 tablet, Rfl: 3  No current facility-administered medications for this visit.

## 2025-04-09 NOTE — TELEPHONE ENCOUNTER
----- Message from Darcy sent at 4/9/2025  2:11 PM CDT -----  Contact: [phone]  [email or web address] Patient is calling for Medical Advice regarding:appt at 2:30 went to wrong clinic How long has patient had these symptoms:on his way now Pharmacy name and phone#:[facility] a call back or [facility]:call back Comments:Please advise patient replies from provider may take up to 48 hours.

## 2025-04-11 NOTE — PROGRESS NOTES
New Patient Chronic Pain Note (Initial Visit)    Referring Physician: Cora Galvez    PCP: Jesse Barnes MD    Chief Complaint:   Chief Complaint   Patient presents with    Shoulder Pain     right        SUBJECTIVE:    Bradley Petty Jr. is a 64 y.o. male who presents to the clinic for the evaluation of right neck and bilateral knee pain. The pain started a few weeks ago following an episode of dizziness and was ultimately diagnosed with rhabdomyolysis.  He reports that his symptoms have been slightly improving.The pain is located in the right cervical myofascial area and also behind both of his knees with radiation into the calves.  The pain is described as  aching  and is rated as 9/10. The pain is rated with a score of  6/10 on the BEST day and a score of 10/10 on the WORST day.  Symptoms interfere with daily activity. The pain is exacerbated by nothing in particular.  The pain is mitigated by Lyrica. Employment status:      Patient denies night fever/night sweats, urinary incontinence, bowel incontinence, significant weight loss, significant motor weakness, and loss of sensations.    Pain Disability Index Review:         4/7/2025    11:20 AM 11/16/2022     8:38 AM 6/28/2022     8:14 AM   Last 3 PDI Scores   Pain Disability Index (PDI) 63 [phone] 49       Non-Pharmacologic Treatments:  Physical Therapy/Home Exercise: no  Ice/Heat:no  TENS: no  Acupuncture: no  Massage: no  Chiropractic: no    Other: no      Pain Medications:  - Opioids: no  - Adjuvant Medications:  Lyrica, trazodone  - Anti-Coagulants:  None     report:  Reviewed and consistent with medication use as prescribed.    Pain Procedures:   Denies      Imaging:   X-ray bilateral knees 04/04/2025:  The left knee reveals minor marginal spurring.  A patellar enthesophyte is noted as well.  Mild lateral patellar tracking is seen.     The right knee reveals mild lateral compartment narrowing with mild marginal spurring.  Minor  patellofemoral joint spurring is present with lateral patellar tracking      Past Medical History:   Diagnosis Date    Acid reflux     Arthralgia     CKD (chronic kidney disease)     Diabetes mellitus     Glaucoma     Hypertension     Sacroiliitis 08/29/2022    Sjogren's syndrome     Sleep apnea      Past Surgical History:   Procedure Laterality Date    BACK SURGERY      EPIDURAL STEROID INJECTION INTO CERVICAL SPINE N/A 10/21/2022    Procedure: C5/6 IL ALLIE w/RN IV Sedation;  Surgeon: Tiffany Diaz MD;  Location: [facility];  Service: Pain Management;  Laterality: N/A;    INJECTION OF JOINT Right 8/29/2022    Procedure: Right sacroiliac joint + GT bursa injection with RN IV sedation;  Surgeon: Tiffany Diaz MD;  Location: [facility];  Service: Pain Management;  Laterality: Right;    SELECTIVE INJECTION OF ANESTHETIC AGENT AROUND LUMBAR SPINAL NERVE ROOT BY TRANSFORAMINAL APPROACH Bilateral 12/30/2022    Procedure: Bilateral L5/S1 TF ALLIE w/RN IV Sedation;  Surgeon: Tiffany Diaz MD;  Location: [facility];  Service: Pain Management;  Laterality: Bilateral;     Social History[1]  Family History   Problem Relation Name Age of Onset    Diabetes Mellitus Maternal Grandmother Madear     Rheum arthritis Maternal Grandmother Madear     Cancer Maternal Grandmother Madear     Glaucoma Maternal Grandmother Madear     Kidney disease Maternal Grandmother Madear     Cancer Mother Mother         kidney left    Hearing loss Mother Mother     Melanoma Neg Hx      Psoriasis Neg Hx      Lupus Neg Hx      Acne Neg Hx         Review of patient's allergies indicates:  No Known Allergies    Current Outpatient Medications   Medication Sig    acetaminophen (TYLENOL) 500 mg Cap Take 1,000 mg by mouth once daily.    azelastine (ASTELIN) 137 mcg (0.1 %) nasal spray 1 spray (137 mcg total) by Nasal route 2 (two) times daily.    b complex vitamins tablet Take 1 tablet by mouth Daily.    cholecalciferol, vitamin D3, 5,000 unit capsule  Take 5,000 Units by mouth Daily.    cyanocobalamin, vitamin B-12, 5,000 mcg TbDL Take 1 tablet by mouth once daily.    emtricitabine-tenofovir 200-300 mg (TRUVADA) 200-300 mg Tab TAKE 1 TABLET BY MOUTH EVERY DAY    HERBAL DRUGS (COLON HERBAL CLEANSER ORAL) Take by mouth once daily.     hydroquinone 4 % Crea Apply to affected areas of face daily for up to 2 months per course, then take at least 1 month off    meclizine (ANTIVERT) 25 mg tablet Take 1 tablet (25 mg total) by mouth 3 (three) times daily as needed for Dizziness.    mirabegron (MYRBETRIQ) 50 mg Tb24 Take 1 tablet (50 mg total) by mouth once daily.    multivitamin capsule Take 1 capsule by mouth Daily.    ondansetron (ZOFRAN-ODT) 4 MG TbDL Take 1 tablet (4 mg total) by mouth every 8 (eight) hours as needed (nausea).    solifenacin (VESICARE) 10 MG tablet TAKE 1 TABLET BY MOUTH EVERY DAY    tolterodine (DETROL LA) 4 MG 24 hr capsule Take 1 capsule (4 mg total) by mouth once daily.    traZODone (DESYREL) 50 MG tablet TAKE 1 TO 2 TABLETS BY MOUTH EVERY NIGHT AT BEDTIME AS NEEDED FOR SLEEP    methylPREDNISolone (MEDROL DOSEPACK) 4 mg tablet use as directed     No current facility-administered medications for this visit.       Review of Systems     GENERAL:  No weight loss, malaise or fevers.  HEENT:   No recent changes in vision or hearing  NECK:  Negative for lumps, no difficulty with swallowing.  RESPIRATORY:  Negative for cough, wheezing or shortness of breath, patient denies any recent URI.  CARDIOVASCULAR:  Negative for chest pain, leg swelling or palpitations.  GI:  Negative for abdominal discomfort, blood in stools or black stools or change in bowel habits.  MUSCULOSKELETAL:  See HPI.  SKIN:  Negative for lesions, rash, and itching.  PSYCH:  No mood disorder or recent psychosocial stressors.  Patients sleep is not disturbed secondary to pain.  HEMATOLOGY/LYMPHOLOGY:  Negative for prolonged bleeding, bruising easily or swollen nodes.  Patient is not  "currently taking any anti-coagulants  NEURO:   No history of headaches, syncope, paralysis, seizures or tremors.  All other reviewed and negative other than HPI.    OBJECTIVE:    BP (!) 158/99 (BP Location: Left arm, Patient Position: Sitting)   Pulse 84   Resp 17   Ht 5' 11" (1.803 m)   Wt 115.8 kg (255 lb 2.9 oz)   BMI 35.59 kg/m²         Physical Exam    GENERAL: Well appearing, in no acute distress, alert and oriented x3.  PSYCH:  Mood and affect appropriate.  SKIN: Skin color, texture, turgor normal, no rashes or lesions.  HEAD/FACE:  Normocephalic, atraumatic. Cranial nerves grossly intact.  NECK:  Minimal pain to palpation over the cervical paraspinous muscles, palpable nodule in the upper trapezius at the mid point.. Spurling Negative. No pain with neck flexion, extension, or lateral flexion.   CV: RRR with palpation of the radial artery.  PULM: No evidence of respiratory difficulty, symmetric chest rise.  GI:  Soft and non-tender.  BACK: Normal range of motion without pain reproduction.   EXTREMITIES: No deformities, edema, or skin discoloration. Good capillary refill.  MUSCULOSKELETAL: Shoulder, hip, and knee provocative maneuvers are unremarkable.   Bilateral upper and lower extremity strength is normal and symmetric.  No atrophy or tone abnormalities are noted.  NEURO: Bilateral upper and lower extremity coordination and muscle stretch reflexes are physiologic and symmetric.  Plantar response are downgoing. No clonus.  No loss of sensation is noted.  GAIT: normal.      LABS:  Lab Results   Component Value Date    WBC 3.65 (L) 03/29/2025    HGB 14.5 03/29/2025    HCT 42.3 03/29/2025    MCV 95 03/29/2025     03/29/2025       CMP  Sodium   Date Value Ref Range Status   04/04/2025 137 136 - 145 mmol/L Final   01/29/2025 135 (L) 136 - 145 mmol/L Final     Potassium   Date Value Ref Range Status   04/04/2025 4.1 3.5 - 5.1 mmol/L Final   01/29/2025 4.5 3.5 - 5.1 mmol/L Final     Chloride   Date " Value Ref Range Status   04/04/2025 103 95 - 110 mmol/L Final   01/29/2025 101 95 - 110 mmol/L Final     CO2   Date Value Ref Range Status   04/04/2025 24 23 - 29 mmol/L Final   01/29/2025 26 23 - 29 mmol/L Final     Glucose   Date Value Ref Range Status   01/29/2025 83 70 - 110 mg/dL Final     BUN   Date Value Ref Range Status   04/04/2025 12 8 - 23 mg/dL Final     Creatinine   Date Value Ref Range Status   04/04/2025 1.4 0.5 - 1.4 mg/dL Final     Calcium   Date Value Ref Range Status   04/04/2025 9.4 8.7 - 10.5 mg/dL Final   01/29/2025 9.4 8.7 - 10.5 mg/dL Final     Total Protein   Date Value Ref Range Status   01/29/2025 8.2 6.0 - 8.4 g/dL Final     Albumin   Date Value Ref Range Status   04/04/2025 4.1 3.5 - 5.2 g/dL Final   01/29/2025 4.0 3.5 - 5.2 g/dL Final     Total Bilirubin   Date Value Ref Range Status   01/29/2025 0.7 0.1 - 1.0 mg/dL Final     Comment:     For infants and newborns, interpretation of results should be based  on gestational age, weight and in agreement with clinical  observations.    Premature Infant recommended reference ranges:  Up to 24 hours.............<8.0 mg/dL  Up to 48 hours............<12.0 mg/dL  3-5 days..................<15.0 mg/dL  6-29 days.................<15.0 mg/dL       Bilirubin Total   Date Value Ref Range Status   04/04/2025 0.6 0.1 - 1.0 mg/dL Final     Comment:     For infants and newborns, interpretation of results should be based   on gestational age, weight and in agreement with clinical   observations.    Premature Infant recommended reference ranges:   0-24 hours:  <8.0 mg/dL   24-48 hours: <12.0 mg/dL   3-5 days:    <15.0 mg/dL   6-29 days:   <15.0 mg/dL     Alkaline Phosphatase   Date Value Ref Range Status   01/29/2025 65 40 - 150 U/L Final     ALP   Date Value Ref Range Status   04/04/2025 85 40 - 150 unit/L Final     AST   Date Value Ref Range Status   04/04/2025 29 11 - 45 unit/L Final   01/29/2025 25 10 - 40 U/L Final     ALT   Date Value Ref Range  Status   04/04/2025 23 10 - 44 unit/L Final   01/29/2025 18 10 - 44 U/L Final     Anion Gap   Date Value Ref Range Status   04/04/2025 10 8 - 16 mmol/L Final     eGFR if    Date Value Ref Range Status   02/09/2022 >60.0 >60 mL/min/1.73 m^2 Final     eGFR if non    Date Value Ref Range Status   02/09/2022 54.2 (A) >60 mL/min/1.73 m^2 Final     Comment:     Calculation used to obtain the estimated glomerular filtration  rate (eGFR) is the CKD-EPI equation.          Lab Results   Component Value Date    HGBA1C 5.5 04/05/2024             ASSESSMENT: 64 y.o. year old male with right cervical myofascial and knee pain, consistent with     1. Lipoma, unspecified site  CANCELED: US Soft Tissue Head Neck      2. History of rhabdomyolysis              PLAN:   - Interventions:  None at this time    - Anticoagulation use:  None      - Medications: I have stressed the importance of physical activity and a home exercise plan to help with pain and improve health. and Patient can continue with medications for now since they are providing benefits, using them appropriately, and without side effects.     - Therapy:  Advised patient continue with activities as tolerated    - Labs:  Reviewed    - Imaging: Reviewed available imaging with patient and answered any questions they had regarding study.  Ordering ultrasound of the right upper extremity/shoulder area to evaluate palpable nodule.    - Consults/Referrals:  None at this time    - Records:  Reviewed/Obtain old records from outside physicians and imaging    - Follow up visit: return to clinic as needed    - Counseled patient regarding the importance of activity modification and physical therapy    - This condition does not require this patient to take time off of work, and the primary goal of our Pain Management services is to improve the patient's functional capacity.    - Patient Questions: Answered all of the patient's questions regarding  diagnosis, therapy, and treatment        The above plan and management options were discussed at length with patient. Patient is in agreement with the above and verbalized understanding.    I discussed the goals of interventional chronic pain management with the patient on today's visit.  I explained the utility of injections for diagnostic and therapeutic purposes.  We discussed a multimodal approach to pain including treating the patient's given worst pain at any given time.  We will use a systematic approach to addressing pain.  We will also adopt a multimodal approach that includes injections, adjuvant medications, physical therapy, at times psychiatry.  There may be a limited role for opioid use intermittently in the treatment of pain, more particularly for acute pain although no one approach can be used as a sole treatment modality.    I emphasized the importance of regular exercise, core strengthening and stretching, diet and weight loss as a cornerstone of long-term pain management.      Murray Gill MD  Interventional Pain Management  [facility]    Disclaimer:  This note was prepared using voice recognition system and is likely to have sound alike errors that may have been overlooked even after proof reading.  Please call me with any questions       [1]   Social History  Socioeconomic History    Marital status: Single   Occupational History     Employer: [employer]   Tobacco Use    Smoking status: Never    Smokeless tobacco: Never    Tobacco comments:     Never used   Substance and Sexual Activity    Alcohol use: Never    Drug use: Never    Sexual activity: Not Currently     Partners: Female     Birth control/protection: None     Social Drivers of Health     Financial Resource Strain: Low Risk  (3/19/2025)    Overall Financial Resource Strain ([facility])     Difficulty of Paying Living Expenses: Not hard at all   Food Insecurity: No Food Insecurity (3/19/2025)    Hunger Vital Sign     Worried  About Running Out of Food in the Last Year: Never true     Ran Out of Food in the Last Year: Never true   Transportation Needs: No Transportation Needs (3/19/2025)    PRAPARE - Transportation     Lack of Transportation (Medical): No     Lack of Transportation (Non-Medical): No   Physical Activity: Sufficiently Active (3/19/2025)    Exercise Vital Sign     Days of Exercise per Week: 2 days     Minutes of Exercise per Session: 90 min   Stress: Stress Concern Present (3/19/2025)    [employer] [place] [employer] - Occupational Stress Questionnaire     Feeling of Stress : Very much   Housing Stability: Low Risk  (3/19/2025)    Housing Stability Vital Sign     Unable to Pay for Housing in the Last Year: No     Homeless in the Last Year: No

## 2025-04-23 ENCOUNTER — TELEPHONE (OUTPATIENT)
Dept: CARDIOLOGY | Facility: HOSPITAL | Age: 65
End: 2025-04-23
Payer: COMMERCIAL

## 2025-04-28 ENCOUNTER — OFFICE VISIT (OUTPATIENT)
Dept: UROLOGY | Facility: CLINIC | Age: 65
End: 2025-04-28
Payer: COMMERCIAL

## 2025-04-28 ENCOUNTER — RESULTS FOLLOW-UP (OUTPATIENT)
Dept: UROLOGY | Facility: CLINIC | Age: 65
End: 2025-04-28

## 2025-04-28 ENCOUNTER — HOSPITAL ENCOUNTER (OUTPATIENT)
Dept: RADIOLOGY | Facility: HOSPITAL | Age: 65
Discharge: HOME | End: 2025-04-28
Attending: STUDENT IN AN ORGANIZED HEALTH CARE EDUCATION/TRAINING PROGRAM
Payer: COMMERCIAL

## 2025-04-28 ENCOUNTER — PATIENT MESSAGE (OUTPATIENT)
Dept: CARDIOLOGY | Facility: HOSPITAL | Age: 65
End: 2025-04-28
Payer: COMMERCIAL

## 2025-04-28 VITALS — RESPIRATION RATE: 18 BRPM | HEIGHT: 72 IN | WEIGHT: 256.81 LBS | BODY MASS INDEX: 34.78 KG/M2

## 2025-04-28 DIAGNOSIS — M25.561 ACUTE PAIN OF RIGHT KNEE: ICD-10-CM

## 2025-04-28 DIAGNOSIS — R97.20 ELEVATED PSA: Primary | ICD-10-CM

## 2025-04-28 DIAGNOSIS — R39.15 URINARY URGENCY: ICD-10-CM

## 2025-04-28 PROCEDURE — 99999 PR PBB SHADOW E&M-EST. PATIENT-LVL IV: CPT | Mod: PBBFAC,,, | Performed by: UROLOGY

## 2025-04-28 PROCEDURE — 3008F BODY MASS INDEX DOCD: CPT | Mod: CPTII,S$GLB,, | Performed by: UROLOGY

## 2025-04-28 PROCEDURE — 93971 EXTREMITY STUDY: CPT | Mod: TC,RT

## 2025-04-28 PROCEDURE — 93971 EXTREMITY STUDY: CPT | Mod: 26,RT,, | Performed by: STUDENT IN AN ORGANIZED HEALTH CARE EDUCATION/TRAINING PROGRAM

## 2025-04-28 PROCEDURE — 99214 OFFICE O/P EST MOD 30 MIN: CPT | Mod: S$GLB,,, | Performed by: UROLOGY

## 2025-04-28 PROCEDURE — 1159F MED LIST DOCD IN RCRD: CPT | Mod: CPTII,S$GLB,, | Performed by: UROLOGY

## 2025-04-28 PROCEDURE — 1160F RVW MEDS BY RX/DR IN RCRD: CPT | Mod: CPTII,S$GLB,, | Performed by: UROLOGY

## 2025-04-28 RX ORDER — LEVOFLOXACIN 500 MG/1
TABLET, FILM COATED ORAL
Qty: 1 TABLET | Refills: 0 | Status: SHIPPED | OUTPATIENT
Start: 2025-04-28

## 2025-04-28 NOTE — LETTER
April 28, 2025      [facility]  [address]  [place] [address]  Phone: [phone]  Fax: [phone]       Patient: Bradley Petty   YOB: 1960  Date of Visit: 04/28/2025    To Whom It May Concern:    Melonie Petty  was at [facility] on 04/28/2025. The patient may return to work on 04/29/2025 with no restrictions. If you have any questions or concerns, or if I can be of further assistance, please do not hesitate to contact me.      Sincerely,    Christina Willingham MD

## 2025-04-28 NOTE — PROGRESS NOTES
Chief Complaint   Patient presents with    Follow-up     MRI review        History of Present Illness:   Bradley Petty Jr. is a 64 y.o. male here for evaluation of Follow-up (MRI review )    4/28/25-MRI dated 4/9/25 read as PI RADS 2. 44cc prostate. He continues to wear a diaper for UUI. He has stopped myrbetriq because it wasn't helping.   4/4/25-Pt reports that he has had urgency and UUI and is wearing a diaper now. He has tried Detrol + Myrbetriq and hasn't noticed improvement. He has also failed vesicare and flomax. States that stream is intermittent and weak.       Review of Systems   Constitutional:  Negative for chills and fever.   Respiratory:  Negative for shortness of breath.    Cardiovascular:  Negative for chest pain.   All other systems reviewed and are negative.        Past Medical History:   Diagnosis Date    Acid reflux     Arthralgia     CKD (chronic kidney disease)     Diabetes mellitus     Glaucoma     Hypertension     Sacroiliitis 08/29/2022    Sjogren's syndrome     Sleep apnea        Past Surgical History:   Procedure Laterality Date    BACK SURGERY      EPIDURAL STEROID INJECTION INTO CERVICAL SPINE N/A 10/21/2022    Procedure: C5/6 IL ALLIE w/RN IV Sedation;  Surgeon: Tiffany Diaz MD;  Location: [facility];  Service: Pain Management;  Laterality: N/A;    INJECTION OF JOINT Right 8/29/2022    Procedure: Right sacroiliac joint + GT bursa injection with RN IV sedation;  Surgeon: Tiffany Diaz MD;  Location: [facility];  Service: Pain Management;  Laterality: Right;    SELECTIVE INJECTION OF ANESTHETIC AGENT AROUND LUMBAR SPINAL NERVE ROOT BY TRANSFORAMINAL APPROACH Bilateral 12/30/2022    Procedure: Bilateral L5/S1 TF ALLIE w/RN IV Sedation;  Surgeon: Tiffany iDaz MD;  Location: [facility];  Service: Pain Management;  Laterality: Bilateral;       Family History   Problem Relation Name Age of Onset    Diabetes Mellitus Maternal Grandmother Madear     Rheum arthritis Maternal  Grandmother Madear     Cancer Maternal Grandmother Madear     Glaucoma Maternal Grandmother Madear     Kidney disease Maternal Grandmother Madear     Cancer Mother Mother         kidney left    Hearing loss Mother Mother     Melanoma Neg Hx      Psoriasis Neg Hx      Lupus Neg Hx      Acne Neg Hx         Social History[1]    Current Medications[2]    Review of patient's allergies indicates:  No Known Allergies    Physical Exam  Vitals:    04/28/25 0850   Resp: 18       General: Well-developed, well-nourished in no acute distress  HEENT: Normocephalic, atraumatic, Extraocular movements intact  Neck: supple, trachea midline, no cervical or supraclavicular lymphadenopathy  Respirations: even and unlabored  Extremities: atraumatic, moves all equally, no clubbing, cyanosis or edema  Psych: normal affect  Skin: warm and dry, no lesions  Neuro: Alert and oriented, Cranial nerves II-XII grossly intact    Urinalysis  pH, UA   Date Value Ref Range Status   04/04/2025 6.0 5.0 - 8.0 Final   04/04/2025 6.0  Final   11/20/2024 7.0 5.0 - 8.0 Final     Glucose, UA   Date Value Ref Range Status   04/04/2025 Negative Negative Final   11/20/2024 Negative Negative Final     Occult Blood UA   Date Value Ref Range Status   11/20/2024 Negative Negative Final     Blood, UA   Date Value Ref Range Status   04/04/2025 Negative Negative Final     Nitrite, UA   Date Value Ref Range Status   11/20/2024 Negative Negative Final     Nitrites, UA   Date Value Ref Range Status   04/04/2025 Negative Negative Final     Leukocyte Esterase, UA   Date Value Ref Range Status   04/04/2025 Negative Negative Final     Leukocytes, UA   Date Value Ref Range Status   11/20/2024 Negative Negative Final         Lab Results   Component Value Date    PSA 8.30 (H) 04/28/2025    PSA 9.95 (H) 04/02/2025    PSA 5.7 (H) 04/05/2024       Testosterone, Total   Date/Time Value Ref Range Status   04/05/2024 05:55  304 - 1227 ng/dL Final       Assessment:   1. Elevated  PSA  Prostate Specific Antigen, Diagnostic      2. Urinary urgency              Plan:  Elevated PSA  -     Prostate Specific Antigen, Diagnostic; Future; Expected date: 04/28/2025    Urinary urgency    Other orders  -     levoFLOXacin (LEVAQUIN) 500 MG tablet; Take 1 po 1 hour before biopsy  Dispense: 1 tablet; Refill: 0    Discussed the sensitivity of MRI of the prostate and I did recommend TRUS biopsy despite a PI RADS 2 read, given his persistently elevated PSA. He is in agreement. Will also book him onto the schedule for Urodynamics.       Follow up for TRUS biopsy.                           [1]   Social History  Tobacco Use    Smoking status: Never    Smokeless tobacco: Never    Tobacco comments:     Never used   Substance Use Topics    Alcohol use: Never    Drug use: Never   [2]   Current Outpatient Medications   Medication Sig Dispense Refill    acetaminophen (TYLENOL) 500 mg Cap Take 1,000 mg by mouth once daily.      azelastine (ASTELIN) 137 mcg (0.1 %) nasal spray 1 spray (137 mcg total) by Nasal route 2 (two) times daily. 30 mL 5    b complex vitamins tablet Take 1 tablet by mouth Daily.      cholecalciferol, vitamin D3, 5,000 unit capsule Take 5,000 Units by mouth Daily.      cyanocobalamin, vitamin B-12, 5,000 mcg TbDL Take 1 tablet by mouth once daily.      emtricitabine-tenofovir 200-300 mg (TRUVADA) 200-300 mg Tab TAKE 1 TABLET BY MOUTH EVERY DAY 30 tablet 4    HERBAL DRUGS (COLON HERBAL CLEANSER ORAL) Take by mouth once daily.       hydroquinone 4 % Crea Apply to affected areas of face daily for up to 2 months per course, then take at least 1 month off 30 g 1    meclizine (ANTIVERT) 25 mg tablet Take 1 tablet (25 mg total) by mouth 3 (three) times daily as needed for Dizziness. 18 tablet 0    methylPREDNISolone (MEDROL DOSEPACK) 4 mg tablet use as directed 21 each 0    mirabegron (MYRBETRIQ) 50 mg Tb24 Take 1 tablet (50 mg total) by mouth once daily. 30 tablet 11    multivitamin capsule Take 1  capsule by mouth Daily.      ondansetron (ZOFRAN-ODT) 4 MG TbDL Take 1 tablet (4 mg total) by mouth every 8 (eight) hours as needed (nausea). 15 tablet 0    solifenacin (VESICARE) 10 MG tablet TAKE 1 TABLET BY MOUTH EVERY DAY 30 tablet 11    tolterodine (DETROL LA) 4 MG 24 hr capsule Take 1 capsule (4 mg total) by mouth once daily. 30 capsule 11    traZODone (DESYREL) 50 MG tablet TAKE 1 TO 2 TABLETS BY MOUTH EVERY NIGHT AT BEDTIME AS NEEDED FOR SLEEP 180 tablet 3    levoFLOXacin (LEVAQUIN) 500 MG tablet Take 1 po 1 hour before biopsy 1 tablet 0     No current facility-administered medications for this visit.

## 2025-04-30 ENCOUNTER — HOSPITAL ENCOUNTER (OUTPATIENT)
Dept: CARDIOLOGY | Facility: HOSPITAL | Age: 65
Discharge: HOME | End: 2025-04-30
Attending: INTERNAL MEDICINE
Payer: COMMERCIAL

## 2025-04-30 ENCOUNTER — HOSPITAL ENCOUNTER (OUTPATIENT)
Dept: RADIOLOGY | Facility: HOSPITAL | Age: 65
Discharge: HOME | End: 2025-04-30
Attending: INTERNAL MEDICINE
Payer: COMMERCIAL

## 2025-04-30 VITALS — WEIGHT: 250 LBS | HEIGHT: 72 IN | BODY MASS INDEX: 33.86 KG/M2

## 2025-04-30 VITALS — HEIGHT: 72 IN | BODY MASS INDEX: 33.86 KG/M2 | WEIGHT: 250 LBS

## 2025-04-30 DIAGNOSIS — R55 SYNCOPE AND COLLAPSE: ICD-10-CM

## 2025-04-30 DIAGNOSIS — R42 DIZZINESS: ICD-10-CM

## 2025-04-30 LAB
CV STRESS BASE HR: 86 BPM
DIASTOLIC BLOOD PRESSURE: 95 MMHG
NUC REST EJECTION FRACTION: 62
OHS CV CPX 1 MINUTE RECOVERY HEART RATE: 115 BPM
OHS CV CPX 85 PERCENT MAX PREDICTED HEART RATE MALE: 133
OHS CV CPX ESTIMATED METS: 8
OHS CV CPX MAX PREDICTED HEART RATE: 157
OHS CV CPX PATIENT IS FEMALE: 0
OHS CV CPX PATIENT IS MALE: 1
OHS CV CPX PEAK DIASTOLIC BLOOD PRESSURE: 74 MMHG
OHS CV CPX PEAK HEAR RATE: 153 BPM
OHS CV CPX PEAK RATE PRESSURE PRODUCT: NORMAL
OHS CV CPX PEAK SYSTOLIC BLOOD PRESSURE: 257 MMHG
OHS CV CPX PERCENT MAX PREDICTED HEART RATE ACHIEVED: 97
OHS CV CPX RATE PRESSURE PRODUCT PRESENTING: NORMAL
OHS CV INITIAL DOSE: 16 MCG/KG/MIN
OHS CV PEAK DOSE: 47.1 MCG/KG/MIN
STRESS ECHO POST EXERCISE DUR MIN: 5 MINUTES
STRESS ECHO POST EXERCISE DUR SEC: 6 SECONDS
SYSTOLIC BLOOD PRESSURE: 144 MMHG

## 2025-04-30 PROCEDURE — 78452 HT MUSCLE IMAGE SPECT MULT: CPT | Mod: 26,,, | Performed by: INTERNAL MEDICINE

## 2025-04-30 PROCEDURE — 93306 TTE W/DOPPLER COMPLETE: CPT | Mod: 26,,, | Performed by: INTERNAL MEDICINE

## 2025-04-30 PROCEDURE — 93017 CV STRESS TEST TRACING ONLY: CPT | Mod: PO

## 2025-04-30 PROCEDURE — 93018 CV STRESS TEST I&R ONLY: CPT | Mod: ,,, | Performed by: INTERNAL MEDICINE

## 2025-04-30 PROCEDURE — A9502 TC99M TETROFOSMIN: HCPCS | Mod: PO | Performed by: INTERNAL MEDICINE

## 2025-04-30 PROCEDURE — 93016 CV STRESS TEST SUPVJ ONLY: CPT | Mod: ,,, | Performed by: INTERNAL MEDICINE

## 2025-04-30 PROCEDURE — 93306 TTE W/DOPPLER COMPLETE: CPT | Mod: PO

## 2025-04-30 PROCEDURE — 78452 HT MUSCLE IMAGE SPECT MULT: CPT | Mod: PO

## 2025-04-30 RX ADMIN — TETROFOSMIN 16 MILLICURIE: 1.38 INJECTION, POWDER, LYOPHILIZED, FOR SOLUTION INTRAVENOUS at 01:04

## 2025-04-30 RX ADMIN — TETROFOSMIN 47.1 MILLICURIE: 1.38 INJECTION, POWDER, LYOPHILIZED, FOR SOLUTION INTRAVENOUS at 01:04

## 2025-05-01 ENCOUNTER — HOSPITAL ENCOUNTER (OUTPATIENT)
Dept: CARDIOLOGY | Facility: HOSPITAL | Age: 65
Discharge: HOME | End: 2025-05-01
Attending: INTERNAL MEDICINE
Payer: COMMERCIAL

## 2025-05-01 ENCOUNTER — PATIENT MESSAGE (OUTPATIENT)
Dept: RHEUMATOLOGY | Facility: CLINIC | Age: 65
End: 2025-05-01

## 2025-05-01 ENCOUNTER — RESULTS FOLLOW-UP (OUTPATIENT)
Dept: CARDIOLOGY | Facility: CLINIC | Age: 65
End: 2025-05-01

## 2025-05-01 ENCOUNTER — OFFICE VISIT (OUTPATIENT)
Dept: RHEUMATOLOGY | Facility: CLINIC | Age: 65
End: 2025-05-01
Payer: COMMERCIAL

## 2025-05-01 VITALS
DIASTOLIC BLOOD PRESSURE: 95 MMHG | HEART RATE: 84 BPM | HEIGHT: 72 IN | BODY MASS INDEX: 34.19 KG/M2 | WEIGHT: 252.44 LBS | SYSTOLIC BLOOD PRESSURE: 149 MMHG

## 2025-05-01 DIAGNOSIS — R42 DIZZINESS: ICD-10-CM

## 2025-05-01 DIAGNOSIS — Z51.81 MEDICATION MONITORING ENCOUNTER: ICD-10-CM

## 2025-05-01 DIAGNOSIS — R74.8 ELEVATED CK: ICD-10-CM

## 2025-05-01 DIAGNOSIS — D84.821 IMMUNOSUPPRESSION DUE TO DRUG THERAPY: ICD-10-CM

## 2025-05-01 DIAGNOSIS — M35.05 SJOGREN SYNDROME WITH INFLAMMATORY ARTHRITIS: Primary | ICD-10-CM

## 2025-05-01 DIAGNOSIS — M15.0 PRIMARY OSTEOARTHRITIS INVOLVING MULTIPLE JOINTS: ICD-10-CM

## 2025-05-01 DIAGNOSIS — Z79.899 IMMUNOSUPPRESSION DUE TO DRUG THERAPY: ICD-10-CM

## 2025-05-01 DIAGNOSIS — M25.50 POLYARTHRALGIA: ICD-10-CM

## 2025-05-01 DIAGNOSIS — M25.50 ARTHRALGIA, UNSPECIFIED JOINT: ICD-10-CM

## 2025-05-01 DIAGNOSIS — E55.9 VITAMIN D INSUFFICIENCY: ICD-10-CM

## 2025-05-01 DIAGNOSIS — R55 SYNCOPE AND COLLAPSE: ICD-10-CM

## 2025-05-01 LAB
ASCENDING AORTA: 3 CM
AV INDEX (PROSTH): 0.7
AV MEAN GRADIENT: 2 MMHG
AV PEAK GRADIENT: 4 MMHG
AV VALVE AREA BY VELOCITY RATIO: 3.3 CM²
AV VALVE AREA: 2.9 CM²
AV VELOCITY RATIO: 0.8
BSA FOR ECHO PROCEDURE: 2.4 M2
CV ECHO LV RWT: 0.49 CM
DOP CALC AO PEAK VEL: 1 M/S
DOP CALC AO VTI: 21.6 CM
DOP CALC LVOT AREA: 4.2 CM2
DOP CALC LVOT DIAMETER: 2.3 CM
DOP CALC LVOT PEAK VEL: 0.8 M/S
DOP CALC LVOT STROKE VOLUME: 62.7 CM3
DOP CALCLVOT PEAK VEL VTI: 15.1 CM
E WAVE DECELERATION TIME: 163 MSEC
E/A RATIO: 0.67
E/E' RATIO: 8 M/S
ECHO LV POSTERIOR WALL: 1 CM (ref 0.6–1.1)
FRACTIONAL SHORTENING: 34.1 % (ref 28–44)
INTERVENTRICULAR SEPTUM: 1.1 CM (ref 0.6–1.1)
IVRT: 123 MSEC
LEFT ATRIUM AREA SYSTOLIC (APICAL 2 CHAMBER): 22.72 CM2
LEFT ATRIUM AREA SYSTOLIC (APICAL 4 CHAMBER): 21.72 CM2
LEFT ATRIUM SIZE: 3.4 CM
LEFT ATRIUM VOLUME INDEX MOD: 30 ML/M2
LEFT ATRIUM VOLUME MOD: 71 ML
LEFT INTERNAL DIMENSION IN SYSTOLE: 2.7 CM (ref 2.1–4)
LEFT VENTRICLE DIASTOLIC VOLUME INDEX: 32.05 ML/M2
LEFT VENTRICLE DIASTOLIC VOLUME: 75 ML
LEFT VENTRICLE END SYSTOLIC VOLUME APICAL 2 CHAMBER: 76.7 ML
LEFT VENTRICLE END SYSTOLIC VOLUME APICAL 4 CHAMBER: 66.29 ML
LEFT VENTRICLE MASS INDEX: 60.5 G/M2
LEFT VENTRICLE SYSTOLIC VOLUME INDEX: 11.5 ML/M2
LEFT VENTRICLE SYSTOLIC VOLUME: 27 ML
LEFT VENTRICULAR INTERNAL DIMENSION IN DIASTOLE: 4.1 CM (ref 3.5–6)
LEFT VENTRICULAR MASS: 141.5 G
LV LATERAL E/E' RATIO: 7.7 M/S
LV SEPTAL E/E' RATIO: 9 M/S
LVED V (TEICH): 74.72 ML
LVES V (TEICH): 26.97 ML
LVOT MG: 1.27 MMHG
LVOT MV: 0.52 CM/S
MV PEAK A VEL: 0.81 M/S
MV PEAK E VEL: 0.54 M/S
MV STENOSIS PRESSURE HALF TIME: 47.32 MS
MV VALVE AREA P 1/2 METHOD: 4.65 CM2
OHS CV RV/LV RATIO: 0.85 CM
PULM VEIN S/D RATIO: 1.37
PV PEAK D VEL: 0.27 M/S
PV PEAK S VEL: 0.37 M/S
RA PRESSURE ESTIMATED: 3 MMHG
RA VOL SYS: 33.56 ML
RIGHT ATRIAL AREA: 13.8 CM2
RIGHT ATRIUM VOLUME AREA LENGTH APICAL 4 CHAMBER: 30.72 ML
RIGHT VENTRICLE DIASTOLIC BASEL DIMENSION: 3.5 CM
RIGHT VENTRICLE DIASTOLIC LENGTH: 7.5 CM
RIGHT VENTRICLE DIASTOLIC MID DIMENSION: 2.5 CM
RIGHT VENTRICULAR END-DIASTOLIC DIMENSION: 3.53 CM
RIGHT VENTRICULAR LENGTH IN DIASTOLE (APICAL 4-CHAMBER VIEW): 7.46 CM
RV MID DIAMA: 2.52 CM
RV TISSUE DOPPLER FREE WALL SYSTOLIC VELOCITY 1 (APICAL 4 CHAMBER VIEW): 14.19 CM/S
SINUS: 3.43 CM
STJ: 2.8 CM
TDI LATERAL: 0.07 M/S
TDI SEPTAL: 0.06 M/S
TDI: 0.07 M/S
TRICUSPID ANNULAR PLANE SYSTOLIC EXCURSION: 1.9 CM
Z-SCORE OF LEFT VENTRICULAR DIMENSION IN END DIASTOLE: -8.54
Z-SCORE OF LEFT VENTRICULAR DIMENSION IN END SYSTOLE: -6.04

## 2025-05-01 PROCEDURE — 99999 PR PBB SHADOW E&M-EST. PATIENT-LVL V: CPT | Mod: PBBFAC,,, | Performed by: INTERNAL MEDICINE

## 2025-05-01 RX ORDER — METHOTREXATE 2.5 MG/1
12.5 TABLET ORAL
Qty: 20 TABLET | Refills: 3 | Status: SHIPPED | OUTPATIENT
Start: 2025-05-01 | End: 2025-05-31

## 2025-05-01 RX ORDER — FOLIC ACID 1 MG/1
1 TABLET ORAL DAILY
Qty: 30 TABLET | Refills: 4 | Status: SHIPPED | OUTPATIENT
Start: 2025-05-01 | End: 2026-05-01

## 2025-05-01 NOTE — PROGRESS NOTES
RHEUMATOLOGY OUTPATIENT CLINIC NOTE    5/1/2025    Attending Rheumatologist: Alden Mejias  Primary Care Provider/Physician Requesting Consultation: Jesse Barnes MD   Chief Complaint/Reason For Consultation:  Joint Pain and sjogrens       Subjective:     Bradley Petty Jr. is a 64 y.o. Black or  male with hx of SjS    Unable to recall past response to MTX+HCQ combination.  Main concern of chronic / progressive widespread arthralgias w/ predominant mechanical pattern and fatigue.    Review of Systems   Constitutional:  Negative for fever.   HENT:  Negative for nosebleeds.    Eyes:  Negative for photophobia and pain.        Denies active sicca sx.   Respiratory:  Negative for cough and shortness of breath.    Cardiovascular:  Negative for chest pain.   Gastrointestinal:  Negative for blood in stool and melena.   Genitourinary:  Negative for hematuria.        Denies hx of kidney stones   Musculoskeletal:  Positive for joint pain and myalgias. Negative for back pain and neck pain.        Denies classic gout precipitants.    Skin:  Negative for rash.        Denies hx of PsO or RP   Neurological:  Negative for focal weakness.       Chronic comorbid conditions affecting medical decision making today:  Past Medical History:   Diagnosis Date    Acid reflux     Arthralgia     CKD (chronic kidney disease)     Diabetes mellitus     Glaucoma     Hypertension     Sacroiliitis 08/29/2022    Sjogren's syndrome     Sleep apnea      Past Surgical History:   Procedure Laterality Date    BACK SURGERY      EPIDURAL STEROID INJECTION INTO CERVICAL SPINE N/A 10/21/2022    Procedure: C5/6 IL ALLIE w/RN IV Sedation;  Surgeon: Tiffany Diaz MD;  Location: [facility];  Service: Pain Management;  Laterality: N/A;    INJECTION OF JOINT Right 8/29/2022    Procedure: Right sacroiliac joint + GT bursa injection with RN IV sedation;  Surgeon: Tiffany Diaz MD;  Location: [facility];  Service: Pain Management;   Laterality: Right;    SELECTIVE INJECTION OF ANESTHETIC AGENT AROUND LUMBAR SPINAL NERVE ROOT BY TRANSFORAMINAL APPROACH Bilateral 12/30/2022    Procedure: Bilateral L5/S1 TF ALLIE w/RN IV Sedation;  Surgeon: Tiffany Diaz MD;  Location: [facility];  Service: Pain Management;  Laterality: Bilateral;     Family History   Problem Relation Name Age of Onset    Diabetes Mellitus Maternal Grandmother Madear     Rheum arthritis Maternal Grandmother Madear     Cancer Maternal Grandmother Madear     Glaucoma Maternal Grandmother Madear     Kidney disease Maternal Grandmother Madear     Cancer Mother Mother         kidney left    Hearing loss Mother Mother     Melanoma Neg Hx      Psoriasis Neg Hx      Lupus Neg Hx      Acne Neg Hx       Tobacco Use History[1]  Current Medications[2]     Objective:     Vitals:    05/01/25 0714   BP: (!) 149/95   Pulse: 84     Physical Exam   Eyes: Conjunctivae are normal.   Pulmonary/Chest: Effort normal. No respiratory distress.   Musculoskeletal:         General: Deformity present. No swelling or tenderness. Normal range of motion.   Neurological: He displays no weakness.   Skin: No rash noted.   No periungual erythema or trophic changes finger pads.  Onychomycosis on toes.       Reviewed available old and all outside pertinent medical records available.    All lab results personally reviewed and interpreted by me.       ASSESSMENT / PLAN     1. Sjogren syndrome with inflammatory arthritis  CDAI: moderate disease activity.  Negative RA serologies.  Persistent SjS serologies  Constitutional and non-specific MSK sx.  Patient amenable for therapeutic trial of MTX for at least 3m, monitor for 20% improvement by next visit.  Sedimentation rate    methotrexate 2.5 MG Tab    folic acid (FOLVITE) 1 MG tablet      2. Polyarthralgia  Ambulatory referral/consult to Rheumatology    Uric Acid      3. Arthralgia, unspecified joint  Ambulatory referral/consult to Rheumatology      4. Primary  osteoarthritis involving multiple joints  Plan for Joint scan if no response to DMARD  Uric Acid      5. Elevated CK  No reproducible weakness, active rash, or respiratory sx.  CXR reported w/o ILD  Ambulatory referral/consult to Rheumatology    CK      6. Medication monitoring encounter  Comprehensive Metabolic Panel      7. Immunosuppression due to drug therapy  Hold immunosuppression in event of active infections.  CBC Auto Differential      8. Vitamin D insufficiency  Vitamin D level              Visit today included increased complexity associated with the care of the episodic problem Sjogren syndrome with inflammatory arthritis addressed and managing the longitudinal care of the patient due to the serious and/or complex managed problem(s) Polyarthralgia , immunosuppression due to drug therapy, Medication monitoring encounter.    60 minutes of total time spent on the encounter, which includes face to face time and non-face to face time preparing to see the patient (eg, review of tests), Obtaining and/or reviewing separately obtained history, Documenting clinical information in the electronic or other health record, Independently interpreting results (not separately reported) and communicating results to the patient/family/caregiver, or Care coordination (not separately reported).     Alden Mejias M.D.         [1]   Social History  Tobacco Use   Smoking Status Never   Smokeless Tobacco Never   Tobacco Comments    Never used   [2]   Current Outpatient Medications:     acetaminophen (TYLENOL) 500 mg Cap, Take 1,000 mg by mouth once daily., Disp: , Rfl:     azelastine (ASTELIN) 137 mcg (0.1 %) nasal spray, 1 spray (137 mcg total) by Nasal route 2 (two) times daily., Disp: 30 mL, Rfl: 5    b complex vitamins tablet, Take 1 tablet by mouth Daily., Disp: , Rfl:     cholecalciferol, vitamin D3, 5,000 unit capsule, Take 5,000 Units by mouth Daily., Disp: , Rfl:     cyanocobalamin, vitamin B-12, 5,000 mcg TbDL, Take 1  tablet by mouth once daily., Disp: , Rfl:     emtricitabine-tenofovir 200-300 mg (TRUVADA) 200-300 mg Tab, TAKE 1 TABLET BY MOUTH EVERY DAY, Disp: 30 tablet, Rfl: 4    HERBAL DRUGS (COLON HERBAL CLEANSER ORAL), Take by mouth once daily. , Disp: , Rfl:     hydroquinone 4 % Crea, Apply to affected areas of face daily for up to 2 months per course, then take at least 1 month off, Disp: 30 g, Rfl: 1    levoFLOXacin (LEVAQUIN) 500 MG tablet, Take 1 po 1 hour before biopsy, Disp: 1 tablet, Rfl: 0    meclizine (ANTIVERT) 25 mg tablet, Take 1 tablet (25 mg total) by mouth 3 (three) times daily as needed for Dizziness., Disp: 18 tablet, Rfl: 0    mirabegron (MYRBETRIQ) 50 mg Tb24, Take 1 tablet (50 mg total) by mouth once daily., Disp: 30 tablet, Rfl: 11    multivitamin capsule, Take 1 capsule by mouth Daily., Disp: , Rfl:     ondansetron (ZOFRAN-ODT) 4 MG TbDL, Take 1 tablet (4 mg total) by mouth every 8 (eight) hours as needed (nausea)., Disp: 15 tablet, Rfl: 0    solifenacin (VESICARE) 10 MG tablet, TAKE 1 TABLET BY MOUTH EVERY DAY, Disp: 30 tablet, Rfl: 11    tolterodine (DETROL LA) 4 MG 24 hr capsule, Take 1 capsule (4 mg total) by mouth once daily., Disp: 30 capsule, Rfl: 11    traZODone (DESYREL) 50 MG tablet, TAKE 1 TO 2 TABLETS BY MOUTH EVERY NIGHT AT BEDTIME AS NEEDED FOR SLEEP, Disp: 180 tablet, Rfl: 3    folic acid (FOLVITE) 1 MG tablet, Take 1 tablet (1 mg total) by mouth once daily., Disp: 30 tablet, Rfl: 4    methotrexate 2.5 MG Tab, Take 5 tablets (12.5 mg total) by mouth every 7 days., Disp: 20 tablet, Rfl: 3  No current facility-administered medications for this visit.

## 2025-05-07 DIAGNOSIS — M35.05 SJOGREN SYNDROME WITH INFLAMMATORY ARTHRITIS: ICD-10-CM

## 2025-05-12 ENCOUNTER — TELEPHONE (OUTPATIENT)
Dept: UROLOGY | Facility: CLINIC | Age: 65
End: 2025-05-12
Payer: COMMERCIAL

## 2025-05-12 NOTE — TELEPHONE ENCOUNTER
.Outgoing call placed to patient, patient verified name and date of birth, patient notified of the pre-procedure instructions for the prostate biopsy. He verbalized understanding and no further assistance needed.

## 2025-05-13 ENCOUNTER — TELEPHONE (OUTPATIENT)
Dept: INTERNAL MEDICINE | Facility: CLINIC | Age: 65
End: 2025-05-13
Payer: COMMERCIAL

## 2025-05-13 ENCOUNTER — PROCEDURE VISIT (OUTPATIENT)
Dept: UROLOGY | Facility: CLINIC | Age: 65
End: 2025-05-13
Payer: COMMERCIAL

## 2025-05-13 VITALS
SYSTOLIC BLOOD PRESSURE: 124 MMHG | HEIGHT: 72 IN | RESPIRATION RATE: 18 BRPM | WEIGHT: 245.56 LBS | DIASTOLIC BLOOD PRESSURE: 86 MMHG | HEART RATE: 101 BPM | BODY MASS INDEX: 33.26 KG/M2

## 2025-05-13 DIAGNOSIS — R97.20 ELEVATED PSA: Primary | ICD-10-CM

## 2025-05-13 DIAGNOSIS — N32.81 OAB (OVERACTIVE BLADDER): ICD-10-CM

## 2025-05-13 LAB
BILIRUBIN, UA POC OHS: NEGATIVE
BLOOD, UA POC OHS: NEGATIVE
CLARITY, UA POC OHS: CLEAR
COLOR, UA POC OHS: YELLOW
GLUCOSE, UA POC OHS: NEGATIVE
KETONES, UA POC OHS: NEGATIVE
LEUKOCYTES, UA POC OHS: NEGATIVE
NITRITE, UA POC OHS: NEGATIVE
PH, UA POC OHS: >=9
PROTEIN, UA POC OHS: >=300
SPECIFIC GRAVITY, UA POC OHS: 1.01
UROBILINOGEN, UA POC OHS: 0.2

## 2025-05-13 PROCEDURE — 88305 TISSUE EXAM BY PATHOLOGIST: CPT | Mod: TC,91 | Performed by: UROLOGY

## 2025-05-13 RX ORDER — LIDOCAINE HYDROCHLORIDE 20 MG/ML
JELLY TOPICAL
Status: COMPLETED | OUTPATIENT
Start: 2025-05-13 | End: 2025-05-13

## 2025-05-13 RX ORDER — CEFTRIAXONE 1 G/1
1 INJECTION, POWDER, FOR SOLUTION INTRAMUSCULAR; INTRAVENOUS
Status: COMPLETED | OUTPATIENT
Start: 2025-05-13 | End: 2025-05-13

## 2025-05-13 RX ADMIN — CEFTRIAXONE 1 G: 1 INJECTION, POWDER, FOR SOLUTION INTRAMUSCULAR; INTRAVENOUS at 08:05

## 2025-05-13 RX ADMIN — LIDOCAINE HYDROCHLORIDE 11 ML: 20 JELLY TOPICAL at 08:05

## 2025-05-13 NOTE — TELEPHONE ENCOUNTER
----- Message from Tech Lloyd sent at 5/13/2025  4:05 AM CDT -----  Regarding: Lab Problem  There was an issue with the   Myomarker 3 panel      test ordered on this patient from 04/04/2025 Unfortunately, the sample is insufficient in volume (Quantity Not Sufficient; QNS) for testing. The order has been cancelled. You will need to reorder and contact the patient for recollection if clinically indicated. If there are any questions, please call the Sendout lab at [phone]. [phone]. Anyone in the Sendout lab will be able to assist you.

## 2025-05-13 NOTE — PROGRESS NOTES
.Patient came in for scheduled prostate biopsy Rocephin 1gm injection ordered, Confirmed patient's allergies, Rocephin 1gm injection administered IM to Right dorsalgluteal with 23G needle using aseptic technique and Urojet Jelly 2% topical used prophylactically to aid in comfort during procedure. Pt tolerated procedure well. Patient agreed to wait 15 minutes after injection in the clinic and to report any adverse reactions.    Seven Chappell RN

## 2025-05-13 NOTE — PROCEDURES
Chief Complaint   Patient presents with    Procedure     Prostate Biopsy        History of Present Illness:   Bradley Petty Jr. is a 64 y.o. male here for evaluation of Procedure (Prostate Biopsy )    5/13/25-TRUS biopsy today.   4/28/25-MRI dated 4/9/25 read as PI RADS 2. 44cc prostate. He continues to wear a diaper for UUI. He has stopped myrbetriq because it wasn't helping.   4/4/25-Pt reports that he has had urgency and UUI and is wearing a diaper now. He has tried Detrol + Myrbetriq and hasn't noticed improvement. He has also failed vesicare and flomax. States that stream is intermittent and weak.       Review of Systems   Constitutional:  Negative for chills and fever.   Respiratory:  Negative for shortness of breath.    Cardiovascular:  Negative for chest pain.   All other systems reviewed and are negative.        Past Medical History:   Diagnosis Date    Acid reflux     Arthralgia     CKD (chronic kidney disease)     Diabetes mellitus     Glaucoma     Hypertension     Sacroiliitis 08/29/2022    Sjogren's syndrome     Sleep apnea        Past Surgical History:   Procedure Laterality Date    BACK SURGERY      EPIDURAL STEROID INJECTION INTO CERVICAL SPINE N/A 10/21/2022    Procedure: C5/6 IL ALLIE w/RN IV Sedation;  Surgeon: Tiffany Diaz MD;  Location: [facility];  Service: Pain Management;  Laterality: N/A;    INJECTION OF JOINT Right 8/29/2022    Procedure: Right sacroiliac joint + GT bursa injection with RN IV sedation;  Surgeon: Tiffany Diaz MD;  Location: [facility];  Service: Pain Management;  Laterality: Right;    SELECTIVE INJECTION OF ANESTHETIC AGENT AROUND LUMBAR SPINAL NERVE ROOT BY TRANSFORAMINAL APPROACH Bilateral 12/30/2022    Procedure: Bilateral L5/S1 TF ALLIE w/RN IV Sedation;  Surgeon: Tiffany Diaz MD;  Location: [facility];  Service: Pain Management;  Laterality: Bilateral;       Family History   Problem Relation Name Age of Onset    Diabetes Mellitus Maternal Grandmother  Madear     Rheum arthritis Maternal Grandmother Madear     Cancer Maternal Grandmother Madear     Glaucoma Maternal Grandmother Madear     Kidney disease Maternal Grandmother Madear     Cancer Mother Mother         kidney left    Hearing loss Mother Mother     Melanoma Neg Hx      Psoriasis Neg Hx      Lupus Neg Hx      Acne Neg Hx         Social History[1]    Current Medications[2]    Review of patient's allergies indicates:  No Known Allergies    Physical Exam  Vitals:    05/13/25 0815   BP: 124/86   Pulse: 101   Resp: 18       General: Well-developed, well-nourished in no acute distress  HEENT: Normocephalic, atraumatic, Extraocular movements intact  Neck: supple, trachea midline, no cervical or supraclavicular lymphadenopathy  Respirations: even and unlabored  Extremities: atraumatic, moves all equally, no clubbing, cyanosis or edema  Psych: normal affect  Skin: warm and dry, no lesions  Neuro: Alert and oriented, Cranial nerves II-XII grossly intact    Urinalysis  pH, POC UA   Date Value Ref Range Status   05/13/2025 >=9.0 5.0 - 8.0 Final     Glucose, UA   Date Value Ref Range Status   04/04/2025 Negative Negative Final   11/20/2024 Negative Negative Final     Occult Blood UA   Date Value Ref Range Status   11/20/2024 Negative Negative Final     Blood, UA   Date Value Ref Range Status   04/04/2025 Negative Negative Final     Nitrite, POC UA   Date Value Ref Range Status   05/13/2025 Negative Negative Final     Leukocyte Esterase, UA   Date Value Ref Range Status   04/04/2025 Negative Negative Final     Leukocytes, UA   Date Value Ref Range Status   11/20/2024 Negative Negative Final         Lab Results   Component Value Date    PSA 8.30 (H) 04/28/2025    PSA 9.95 (H) 04/02/2025    PSA 5.7 (H) 04/05/2024       Testosterone, Total   Date/Time Value Ref Range Status   04/05/2024 05:55  304 - 1227 ng/dL Final       Procedure: (1) Transrectal Prostate Biopsy                      (2) Transrectal ultrasound of  prostate                     (3) Ultrasound Guidance of Prostate Biopsy needles      Detail: Antibiotic prophylaxis was provided using Rocephin and levaquin. After proper consents were obtained, the patient was prepped and draped in normal fashion in the left lateral decubitus position for TRUS/Bx.  5 ml of lidocaine jelly was instilled in the rectum.  Rectal exam noted a smooth prostate. The U/S with rectal probe was into the patient's rectum.  A spinal needle was used and 10ml of 1% lidocaine was instilled on the side of the prostate at the base of the seminal vesicles. Systematic review was performed of the prostate, noting no lesions. The prostate measured to be 37g. Biopsy was then performed using an 18Ga biopsy needle directed at the base, mid and apex bilaterally for a total of 12 cores. The patient tolerated the procedure well. Estimated blood loss was 2cc.       Assessment:   1. Elevated PSA  POCT Urinalysis(Instrument)    Specimen to Pathology Urology      2. OAB (overactive bladder)              Plan:  Elevated PSA  -     POCT Urinalysis(Instrument)  -     Specimen to Pathology Urology    OAB (overactive bladder)    Other orders  -     cefTRIAXone injection 1 g  -     LIDOcaine HCl 2% urojet    I had a detailed discussion with the patient regarding post-TRUS biopsy fever and need to go to the ED for admission for IV antibiotics for any temperature above 100.4 degrees.     Follow up in about 2 weeks (around 5/27/2025).                           [1]   Social History  Tobacco Use    Smoking status: Never    Smokeless tobacco: Never    Tobacco comments:     Never used   Substance Use Topics    Alcohol use: Never    Drug use: Never   [2]   Current Outpatient Medications   Medication Sig Dispense Refill    acetaminophen (TYLENOL) 500 mg Cap Take 1,000 mg by mouth once daily.      azelastine (ASTELIN) 137 mcg (0.1 %) nasal spray 1 spray (137 mcg total) by Nasal route 2 (two) times daily. 30 mL 5    b complex  vitamins tablet Take 1 tablet by mouth Daily.      cholecalciferol, vitamin D3, 5,000 unit capsule Take 5,000 Units by mouth Daily.      cyanocobalamin, vitamin B-12, 5,000 mcg TbDL Take 1 tablet by mouth once daily.      emtricitabine-tenofovir 200-300 mg (TRUVADA) 200-300 mg Tab TAKE 1 TABLET BY MOUTH EVERY DAY 30 tablet 4    folic acid (FOLVITE) 1 MG tablet Take 1 tablet (1 mg total) by mouth once daily. 30 tablet 4    HERBAL DRUGS (COLON HERBAL CLEANSER ORAL) Take by mouth once daily.       hydroquinone 4 % Crea Apply to affected areas of face daily for up to 2 months per course, then take at least 1 month off 30 g 1    levoFLOXacin (LEVAQUIN) 500 MG tablet Take 1 po 1 hour before biopsy 1 tablet 0    meclizine (ANTIVERT) 25 mg tablet Take 1 tablet (25 mg total) by mouth 3 (three) times daily as needed for Dizziness. 18 tablet 0    methotrexate 2.5 MG Tab Take 5 tablets (12.5 mg total) by mouth every 7 days. 20 tablet 3    mirabegron (MYRBETRIQ) 50 mg Tb24 Take 1 tablet (50 mg total) by mouth once daily. 30 tablet 11    multivitamin capsule Take 1 capsule by mouth Daily.      ondansetron (ZOFRAN-ODT) 4 MG TbDL Take 1 tablet (4 mg total) by mouth every 8 (eight) hours as needed (nausea). 15 tablet 0    solifenacin (VESICARE) 10 MG tablet TAKE 1 TABLET BY MOUTH EVERY DAY 30 tablet 11    tolterodine (DETROL LA) 4 MG 24 hr capsule Take 1 capsule (4 mg total) by mouth once daily. 30 capsule 11    traZODone (DESYREL) 50 MG tablet TAKE 1 TO 2 TABLETS BY MOUTH EVERY NIGHT AT BEDTIME AS NEEDED FOR SLEEP 180 tablet 3     No current facility-administered medications for this visit.

## 2025-05-14 RX ORDER — FOLIC ACID 1 MG/1
1000 TABLET ORAL
Qty: 90 TABLET | Refills: 1 | Status: SHIPPED | OUTPATIENT
Start: 2025-05-14

## 2025-05-15 LAB
DHEA SERPL-MCNC: ABNORMAL
ESTROGEN SERPL-MCNC: ABNORMAL PG/ML
INSULIN SERPL-ACNC: ABNORMAL U[IU]/ML
LAB AP CLINICAL INFORMATION: ABNORMAL
LAB AP DIAGNOSIS CATEGORY: ABNORMAL
LAB AP GROSS DESCRIPTION: ABNORMAL
LAB AP PERFORMING LOCATION(S): ABNORMAL
LAB AP REPORT FOOTNOTES: ABNORMAL

## 2025-05-28 ENCOUNTER — TELEPHONE (OUTPATIENT)
Dept: UROLOGY | Facility: CLINIC | Age: 65
End: 2025-05-28
Payer: COMMERCIAL

## 2025-05-28 ENCOUNTER — OFFICE VISIT (OUTPATIENT)
Dept: UROLOGY | Facility: CLINIC | Age: 65
End: 2025-05-28
Payer: COMMERCIAL

## 2025-05-28 VITALS
WEIGHT: 246.69 LBS | HEIGHT: 72 IN | SYSTOLIC BLOOD PRESSURE: 149 MMHG | TEMPERATURE: 98 F | RESPIRATION RATE: 18 BRPM | BODY MASS INDEX: 33.41 KG/M2 | DIASTOLIC BLOOD PRESSURE: 89 MMHG | HEART RATE: 80 BPM

## 2025-05-28 DIAGNOSIS — C61 PROSTATE CANCER: Primary | ICD-10-CM

## 2025-05-28 DIAGNOSIS — R39.15 URINARY URGENCY: ICD-10-CM

## 2025-05-28 DIAGNOSIS — R97.20 ELEVATED PSA: ICD-10-CM

## 2025-05-28 LAB
BILIRUBIN, UA POC OHS: NEGATIVE
BLOOD, UA POC OHS: NEGATIVE
CLARITY, UA POC OHS: CLEAR
COLOR, UA POC OHS: YELLOW
GLUCOSE, UA POC OHS: NEGATIVE
KETONES, UA POC OHS: NEGATIVE
LEUKOCYTES, UA POC OHS: ABNORMAL
NITRITE, UA POC OHS: NEGATIVE
PH, UA POC OHS: 5.5
PROTEIN, UA POC OHS: 30
SPECIFIC GRAVITY, UA POC OHS: 1.02
UROBILINOGEN, UA POC OHS: 0.2

## 2025-05-28 PROCEDURE — 1159F MED LIST DOCD IN RCRD: CPT | Mod: CPTII,S$GLB,, | Performed by: UROLOGY

## 2025-05-28 PROCEDURE — 3008F BODY MASS INDEX DOCD: CPT | Mod: CPTII,S$GLB,, | Performed by: UROLOGY

## 2025-05-28 PROCEDURE — 99214 OFFICE O/P EST MOD 30 MIN: CPT | Mod: S$GLB,,, | Performed by: UROLOGY

## 2025-05-28 PROCEDURE — 3079F DIAST BP 80-89 MM HG: CPT | Mod: CPTII,S$GLB,, | Performed by: UROLOGY

## 2025-05-28 PROCEDURE — 99999 PR PBB SHADOW E&M-EST. PATIENT-LVL V: CPT | Mod: PBBFAC,,, | Performed by: UROLOGY

## 2025-05-28 PROCEDURE — 81003 URINALYSIS AUTO W/O SCOPE: CPT | Mod: QW,S$GLB,, | Performed by: UROLOGY

## 2025-05-28 PROCEDURE — 3077F SYST BP >= 140 MM HG: CPT | Mod: CPTII,S$GLB,, | Performed by: UROLOGY

## 2025-05-28 NOTE — PROGRESS NOTES
Chief Complaint   Patient presents with    Results       History of Present Illness:   Bradley Petty Jr. is a 64 y.o. male here for evaluation of Results    5/28/25-Braxton 1 and 2 prostate adenocarcinoma in 4/12 cores. He continues to wear diapers for UUI. Failed several medications. No pelvic pain.   5/13/25-TRUS biopsy today.   4/28/25-MRI dated 4/9/25 read as PI RADS 2. 44cc prostate. He continues to wear a diaper for UUI. He has stopped myrbetriq because it wasn't helping.   4/4/25-Pt reports that he has had urgency and UUI and is wearing a diaper now. He has tried Detrol + Myrbetriq and hasn't noticed improvement. He has also failed vesicare and flomax. States that stream is intermittent and weak.       Review of Systems   Constitutional:  Negative for chills and fever.   Respiratory:  Negative for shortness of breath.    Cardiovascular:  Negative for chest pain.   All other systems reviewed and are negative.        Past Medical History:   Diagnosis Date    Acid reflux     Arthralgia     CKD (chronic kidney disease)     Diabetes mellitus     Glaucoma     Hypertension     Sacroiliitis 08/29/2022    Sjogren's syndrome     Sleep apnea        Past Surgical History:   Procedure Laterality Date    BACK SURGERY      EPIDURAL STEROID INJECTION INTO CERVICAL SPINE N/A 10/21/2022    Procedure: C5/6 IL ALLIE w/RN IV Sedation;  Surgeon: Tiffany Diaz MD;  Location: [facility];  Service: Pain Management;  Laterality: N/A;    INJECTION OF JOINT Right 8/29/2022    Procedure: Right sacroiliac joint + GT bursa injection with RN IV sedation;  Surgeon: Tiffany Diaz MD;  Location: [facility];  Service: Pain Management;  Laterality: Right;    SELECTIVE INJECTION OF ANESTHETIC AGENT AROUND LUMBAR SPINAL NERVE ROOT BY TRANSFORAMINAL APPROACH Bilateral 12/30/2022    Procedure: Bilateral L5/S1 TF ALLIE w/RN IV Sedation;  Surgeon: Tiffany Diaz MD;  Location: [facility];  Service: Pain Management;  Laterality: Bilateral;        Family History   Problem Relation Name Age of Onset    Diabetes Mellitus Maternal Grandmother Madear     Rheum arthritis Maternal Grandmother Madear     Cancer Maternal Grandmother Madear     Glaucoma Maternal Grandmother Madear     Kidney disease Maternal Grandmother Madear     Cancer Mother Mother         kidney left    Hearing loss Mother Mother     Melanoma Neg Hx      Psoriasis Neg Hx      Lupus Neg Hx      Acne Neg Hx         Social History[1]    Current Medications[2]    Review of patient's allergies indicates:  No Known Allergies    Physical Exam  Vitals:    05/28/25 0827   BP: (!) 149/89   Pulse: 80   Resp: 18   Temp: 98.2 °F (36.8 °C)       General: Well-developed, well-nourished in no acute distress  HEENT: Normocephalic, atraumatic, Extraocular movements intact  Neck: supple, trachea midline, no cervical or supraclavicular lymphadenopathy  Respirations: even and unlabored  Extremities: atraumatic, moves all equally, no clubbing, cyanosis or edema  Psych: normal affect  Skin: warm and dry, no lesions  Neuro: Alert and oriented, Cranial nerves II-XII grossly intact    Urinalysis  pH, POC UA   Date Value Ref Range Status   05/28/2025 5.5 5.0 - 8.0 Final     Glucose, UA   Date Value Ref Range Status   04/04/2025 Negative Negative Final   11/20/2024 Negative Negative Final     Occult Blood UA   Date Value Ref Range Status   11/20/2024 Negative Negative Final     Blood, UA   Date Value Ref Range Status   04/04/2025 Negative Negative Final     Nitrite, POC UA   Date Value Ref Range Status   05/28/2025 Negative Negative Final     Leukocyte Esterase, UA   Date Value Ref Range Status   04/04/2025 Negative Negative Final     Leukocytes, UA   Date Value Ref Range Status   11/20/2024 Negative Negative Final         Lab Results   Component Value Date    PSA 8.30 (H) 04/28/2025    PSA 9.95 (H) 04/02/2025    PSA 5.7 (H) 04/05/2024       Testosterone, Total   Date/Time Value Ref Range Status   04/05/2024 05:55 PM  493 304 - 1227 ng/dL Final         Assessment:   1. Prostate cancer  Ambulatory referral/consult to Radiation Oncology      2. Elevated PSA  POCT Urinalysis(Instrument)      3. Urinary urgency              Plan:  Prostate cancer  -     Ambulatory referral/consult to Radiation Oncology; Future; Expected date: 06/04/2025    Elevated PSA  -     POCT Urinalysis(Instrument)    Urinary urgency    I had a detailed discussion with the patient today regarding this new diagnosis of prostate cancer. We discussed the natural history of prostate cancer and the [place] [employer]ading system, as it relates to him. We discussed management options for organ confined prostate cancer. We discussed radical prostatectomy, and the risks and benefits of this option. We discussed open vs robotic surgery. We discussed the risks and expected side effects, including stress urinary incontinence, erectile dysfunction and penile shortening. We discussed the risk of surgery as it relates to his overall health. We also discussed external beam radiation and the risks/benefits of this option. We discussed new onset/increased irritative voiding symptoms. We discussed the possibility of radiation cystitis and radiation proctitis. We discussed associated erectile dysfunction as well. We also discussed brachytherapy and the risks/benefits associated. We also discussed active surveillance, though this was not recommended. We discussed the expected side effects of the curative options as they relate to his urge urinary incontinence and the potential management options in the future should UUI persist.    He would like consultation with both radiation oncology and a partner to discuss RALP. Will arrange.     I spent a total of 30 minutes on the day of the visit.  This includes face to face time and non-face to face time preparing to see the patient (eg, review of tests), obtaining and/or reviewing separately obtained history, documenting clinical information in  the electronic or other health record, independently interpreting results and communicating results to the patient/family/caregiver, or care coordinator.                             [1]   Social History  Tobacco Use    Smoking status: Never    Smokeless tobacco: Never    Tobacco comments:     Never used   Substance Use Topics    Alcohol use: Never    Drug use: Never   [2]   Current Outpatient Medications   Medication Sig Dispense Refill    acetaminophen (TYLENOL) 500 mg Cap Take 1,000 mg by mouth once daily.      azelastine (ASTELIN) 137 mcg (0.1 %) nasal spray 1 spray (137 mcg total) by Nasal route 2 (two) times daily. 30 mL 5    b complex vitamins tablet Take 1 tablet by mouth Daily.      cholecalciferol, vitamin D3, 5,000 unit capsule Take 5,000 Units by mouth Daily.      cyanocobalamin, vitamin B-12, 5,000 mcg TbDL Take 1 tablet by mouth once daily.      emtricitabine-tenofovir 200-300 mg (TRUVADA) 200-300 mg Tab TAKE 1 TABLET BY MOUTH EVERY DAY 30 tablet 4    folic acid (FOLVITE) 1 MG tablet TAKE 1 TABLET BY MOUTH EVERY DAY 90 tablet 1    HERBAL DRUGS (COLON HERBAL CLEANSER ORAL) Take by mouth once daily.       hydroquinone 4 % Crea Apply to affected areas of face daily for up to 2 months per course, then take at least 1 month off 30 g 1    levoFLOXacin (LEVAQUIN) 500 MG tablet Take 1 po 1 hour before biopsy 1 tablet 0    meclizine (ANTIVERT) 25 mg tablet Take 1 tablet (25 mg total) by mouth 3 (three) times daily as needed for Dizziness. 18 tablet 0    methotrexate 2.5 MG Tab Take 5 tablets (12.5 mg total) by mouth every 7 days. 20 tablet 3    mirabegron (MYRBETRIQ) 50 mg Tb24 Take 1 tablet (50 mg total) by mouth once daily. 30 tablet 11    multivitamin capsule Take 1 capsule by mouth Daily.      ondansetron (ZOFRAN-ODT) 4 MG TbDL Take 1 tablet (4 mg total) by mouth every 8 (eight) hours as needed (nausea). 15 tablet 0    solifenacin (VESICARE) 10 MG tablet TAKE 1 TABLET BY MOUTH EVERY DAY 30 tablet 11     tolterodine (DETROL LA) 4 MG 24 hr capsule Take 1 capsule (4 mg total) by mouth once daily. 30 capsule 11    traZODone (DESYREL) 50 MG tablet TAKE 1 TO 2 TABLETS BY MOUTH EVERY NIGHT AT BEDTIME AS NEEDED FOR SLEEP 180 tablet 3     No current facility-administered medications for this visit.

## 2025-05-28 NOTE — TELEPHONE ENCOUNTER
Copied from CRM #[id number]. Topic: General Inquiry - Patient Advice  >> May 28, 2025  7:48 AM Lisa wrote:  Patient is running late due to plane was late. Just [place] and enroute. Maybe 10 minutes late. Just fyi    ----------------------------------    Staff notified, AL, LPN.

## 2025-06-02 ENCOUNTER — TELEPHONE (OUTPATIENT)
Dept: UROLOGY | Facility: CLINIC | Age: 65
End: 2025-06-02
Payer: COMMERCIAL

## 2025-06-16 ENCOUNTER — LAB VISIT (OUTPATIENT)
Dept: LAB | Facility: HOSPITAL | Age: 65
End: 2025-06-16
Attending: UROLOGY
Payer: COMMERCIAL

## 2025-06-16 ENCOUNTER — OFFICE VISIT (OUTPATIENT)
Dept: UROLOGY | Facility: CLINIC | Age: 65
End: 2025-06-16
Payer: COMMERCIAL

## 2025-06-16 VITALS
DIASTOLIC BLOOD PRESSURE: 89 MMHG | WEIGHT: 252.63 LBS | HEIGHT: 72 IN | SYSTOLIC BLOOD PRESSURE: 155 MMHG | RESPIRATION RATE: 16 BRPM | HEART RATE: 77 BPM | BODY MASS INDEX: 34.22 KG/M2

## 2025-06-16 DIAGNOSIS — R39.15 URINARY URGENCY: ICD-10-CM

## 2025-06-16 DIAGNOSIS — N32.81 OAB (OVERACTIVE BLADDER): ICD-10-CM

## 2025-06-16 DIAGNOSIS — C61 MALIGNANT NEOPLASM OF PROSTATE: Primary | ICD-10-CM

## 2025-06-16 DIAGNOSIS — C61 MALIGNANT NEOPLASM OF PROSTATE: ICD-10-CM

## 2025-06-16 DIAGNOSIS — N39.41 URGE INCONTINENCE: ICD-10-CM

## 2025-06-16 LAB
CREAT SERPL-MCNC: 1.5 MG/DL (ref 0.5–1.4)
GFR SERPLBLD CREATININE-BSD FMLA CKD-EPI: 52 ML/MIN/1.73/M2
PSA SERPL-MCNC: 11.98 NG/ML

## 2025-06-16 PROCEDURE — 3077F SYST BP >= 140 MM HG: CPT | Mod: CPTII,S$GLB,, | Performed by: UROLOGY

## 2025-06-16 PROCEDURE — 99215 OFFICE O/P EST HI 40 MIN: CPT | Mod: S$GLB,,, | Performed by: UROLOGY

## 2025-06-16 PROCEDURE — 36415 COLL VENOUS BLD VENIPUNCTURE: CPT

## 2025-06-16 PROCEDURE — 99999 PR PBB SHADOW E&M-EST. PATIENT-LVL V: CPT | Mod: PBBFAC,,, | Performed by: UROLOGY

## 2025-06-16 PROCEDURE — 84153 ASSAY OF PSA TOTAL: CPT

## 2025-06-16 PROCEDURE — 3008F BODY MASS INDEX DOCD: CPT | Mod: CPTII,S$GLB,, | Performed by: UROLOGY

## 2025-06-16 PROCEDURE — 1159F MED LIST DOCD IN RCRD: CPT | Mod: CPTII,S$GLB,, | Performed by: UROLOGY

## 2025-06-16 PROCEDURE — 82565 ASSAY OF CREATININE: CPT

## 2025-06-16 PROCEDURE — 3079F DIAST BP 80-89 MM HG: CPT | Mod: CPTII,S$GLB,, | Performed by: UROLOGY

## 2025-06-16 NOTE — PROGRESS NOTES
Chief Complaint:   Encounter Diagnoses   Name Primary?    Malignant neoplasm of prostate Yes    Urinary urgency     OAB (overactive bladder)     Urge incontinence        HPI:  HPI Bradley Petty Jr. colt 64 y.o. male who presents has a referral for evaluation of prostate cancer.  Patient has been having severe overactive bladder symptoms and urge incontinence for the last 3 years.  He has failed alpha-blocker and anticholinergic with the VESIcare.  He is now wearing a diaper at work.  He works as a gym urologist.  He is unable to keep control of his urine.  During his evaluation with Dr. Willingham in consideration for InterStim he was found to have an rising and elevated PSA.  MRI did not show a significantly enlarged prostate or any evidence of prostate cancer.  He underwent a biopsy which showed Braxton 3 + 4 disease.  He is considering his treatment options.    History:  Social History[1]  Past Medical History:   Diagnosis Date    Acid reflux     Arthralgia     CKD (chronic kidney disease)     Diabetes mellitus     Glaucoma     Hypertension     Sacroiliitis 08/29/2022    Sjogren's syndrome     Sleep apnea      Past Surgical History:   Procedure Laterality Date    BACK SURGERY      EPIDURAL STEROID INJECTION INTO CERVICAL SPINE N/A 10/21/2022    Procedure: C5/6 IL ALLIE w/RN IV Sedation;  Surgeon: Tiffany Diaz MD;  Location: [facility];  Service: Pain Management;  Laterality: N/A;    INJECTION OF JOINT Right 8/29/2022    Procedure: Right sacroiliac joint + GT bursa injection with RN IV sedation;  Surgeon: Tiffany Diaz MD;  Location: [facility];  Service: Pain Management;  Laterality: Right;    SELECTIVE INJECTION OF ANESTHETIC AGENT AROUND LUMBAR SPINAL NERVE ROOT BY TRANSFORAMINAL APPROACH Bilateral 12/30/2022    Procedure: Bilateral L5/S1 TF ALLIE w/RN IV Sedation;  Surgeon: Tiffany Diaz MD;  Location: [facility];  Service: Pain Management;  Laterality: Bilateral;     Family History   Problem Relation  Name Age of Onset    Diabetes Mellitus Maternal Grandmother Blake     Rheum arthritis Maternal Grandmother Blake     Cancer Maternal Grandmother Blake     Glaucoma Maternal Grandmother Blake     Kidney disease Maternal Grandmother Blake     Cancer Mother Mother         kidney left    Hearing loss Mother Mother     Melanoma Neg Hx      Psoriasis Neg Hx      Lupus Neg Hx      Acne Neg Hx         Medications Ordered Prior to Encounter[2]     Objective:     Vitals:    06/16/25 0804   BP: (!) 155/89   BP Location: Right arm   Pulse: 77   Resp: 16   Weight: 114.6 kg (252 lb 10.4 oz)   Height: 6' (1.829 m)      BMI Readings from Last 1 Encounters:   06/16/25 34.27 kg/m²          Physical Exam  No acute distress alert and oriented   Respirations even unlabored   Abdomen is obese  Lab Results   Component Value Date    PSA 8.30 (H) 04/28/2025    PSA 9.95 (H) 04/02/2025    PSA 5.7 (H) 04/05/2024    PSA 3.2 04/26/2022    PSA 2.1 07/06/2017    PSA 1.6 10/22/2014        Lab Results   Component Value Date    CREATININE 1.4 04/04/2025      Assessment:       1. Malignant neoplasm of prostate    2. Urinary urgency    3. OAB (overactive bladder)    4. Urge incontinence        Plan:     1. Malignant neoplasm of prostate    2. Urinary urgency    3. OAB (overactive bladder)    4. Urge incontinence       Orders Placed This Encounter    CT Abdomen Pelvis W Wo Contrast    NM Bone Scan Whole Body    Prostate Specific Antigen, Diagnostic    Creatinine, serum      We had an extensive discussion today regarding prostate cancer, treatment options and side effects.  We also discussed his overactive bladder symptoms at length.  I agree with current evaluation with a urodynamic studies.  I also want him to have a cystoscopy.  We will go ahead and get a CT and bone scan and repeat PSA as well.  My concern is with his overactive bladder and urge incontinence that removing his prostate may lead to bernard incontinence.  I am also concerned about  radiation therapy worsening his symptoms.  We will make decisions based upon the above-mentioned evaluation.       [1]   Social History  Tobacco Use    Smoking status: Never    Smokeless tobacco: Never    Tobacco comments:     Never used   Substance Use Topics    Alcohol use: Never    Drug use: Never   [2]   Current Outpatient Medications on File Prior to Visit   Medication Sig Dispense Refill    acetaminophen (TYLENOL) 500 mg Cap Take 1,000 mg by mouth once daily.      azelastine (ASTELIN) 137 mcg (0.1 %) nasal spray 1 spray (137 mcg total) by Nasal route 2 (two) times daily. 30 mL 5    b complex vitamins tablet Take 1 tablet by mouth Daily.      cholecalciferol, vitamin D3, 5,000 unit capsule Take 5,000 Units by mouth Daily.      cyanocobalamin, vitamin B-12, 5,000 mcg TbDL Take 1 tablet by mouth once daily.      emtricitabine-tenofovir 200-300 mg (TRUVADA) 200-300 mg Tab TAKE 1 TABLET BY MOUTH EVERY DAY 30 tablet 4    folic acid (FOLVITE) 1 MG tablet TAKE 1 TABLET BY MOUTH EVERY DAY 90 tablet 1    HERBAL DRUGS (COLON HERBAL CLEANSER ORAL) Take by mouth once daily.       hydroquinone 4 % Crea Apply to affected areas of face daily for up to 2 months per course, then take at least 1 month off 30 g 1    levoFLOXacin (LEVAQUIN) 500 MG tablet Take 1 po 1 hour before biopsy 1 tablet 0    meclizine (ANTIVERT) 25 mg tablet Take 1 tablet (25 mg total) by mouth 3 (three) times daily as needed for Dizziness. 18 tablet 0    mirabegron (MYRBETRIQ) 50 mg Tb24 Take 1 tablet (50 mg total) by mouth once daily. 30 tablet 11    multivitamin capsule Take 1 capsule by mouth Daily.      ondansetron (ZOFRAN-ODT) 4 MG TbDL Take 1 tablet (4 mg total) by mouth every 8 (eight) hours as needed (nausea). 15 tablet 0    solifenacin (VESICARE) 10 MG tablet TAKE 1 TABLET BY MOUTH EVERY DAY 30 tablet 11    tolterodine (DETROL LA) 4 MG 24 hr capsule Take 1 capsule (4 mg total) by mouth once daily. 30 capsule 11    traZODone (DESYREL) 50 MG tablet  TAKE 1 TO 2 TABLETS BY MOUTH EVERY NIGHT AT BEDTIME AS NEEDED FOR SLEEP 180 tablet 3    methotrexate 2.5 MG Tab Take 5 tablets (12.5 mg total) by mouth every 7 days. 20 tablet 3     No current facility-administered medications on file prior to visit.

## 2025-06-17 ENCOUNTER — RESULTS FOLLOW-UP (OUTPATIENT)
Dept: UROLOGY | Facility: CLINIC | Age: 65
End: 2025-06-17

## 2025-06-18 ENCOUNTER — INITIAL CONSULT (OUTPATIENT)
Dept: RADIATION ONCOLOGY | Facility: CLINIC | Age: 65
End: 2025-06-18
Payer: COMMERCIAL

## 2025-06-18 ENCOUNTER — LAB VISIT (OUTPATIENT)
Dept: LAB | Facility: HOSPITAL | Age: 65
End: 2025-06-18
Attending: INTERNAL MEDICINE
Payer: COMMERCIAL

## 2025-06-18 ENCOUNTER — TELEPHONE (OUTPATIENT)
Dept: UROLOGY | Facility: CLINIC | Age: 65
End: 2025-06-18
Payer: COMMERCIAL

## 2025-06-18 VITALS
RESPIRATION RATE: 19 BRPM | OXYGEN SATURATION: 99 % | HEIGHT: 72 IN | BODY MASS INDEX: 34.13 KG/M2 | DIASTOLIC BLOOD PRESSURE: 85 MMHG | SYSTOLIC BLOOD PRESSURE: 141 MMHG | WEIGHT: 252 LBS | TEMPERATURE: 99 F | HEART RATE: 77 BPM

## 2025-06-18 DIAGNOSIS — C61 PROSTATE CANCER: ICD-10-CM

## 2025-06-18 DIAGNOSIS — Z79.899 ON PRE-EXPOSURE PROPHYLAXIS FOR HIV: ICD-10-CM

## 2025-06-18 LAB
ABSOLUTE EOSINOPHIL (OHS): 0.11 K/UL
ABSOLUTE MONOCYTE (OHS): 0.25 K/UL (ref 0.3–1)
ABSOLUTE NEUTROPHIL COUNT (OHS): 1.54 K/UL (ref 1.8–7.7)
ALBUMIN SERPL BCP-MCNC: 3.9 G/DL (ref 3.5–5.2)
ALP SERPL-CCNC: 76 UNIT/L (ref 40–150)
ALT SERPL W/O P-5'-P-CCNC: 17 UNIT/L (ref 10–44)
ANION GAP (OHS): 7 MMOL/L (ref 8–16)
AST SERPL-CCNC: 24 UNIT/L (ref 11–45)
BASOPHILS # BLD AUTO: 0.03 K/UL
BASOPHILS NFR BLD AUTO: 0.9 %
BILIRUB SERPL-MCNC: 0.3 MG/DL (ref 0.1–1)
BUN SERPL-MCNC: 16 MG/DL (ref 8–23)
CALCIUM SERPL-MCNC: 8.9 MG/DL (ref 8.7–10.5)
CHLORIDE SERPL-SCNC: 105 MMOL/L (ref 95–110)
CO2 SERPL-SCNC: 27 MMOL/L (ref 23–29)
CREAT SERPL-MCNC: 1.4 MG/DL (ref 0.5–1.4)
ERYTHROCYTE [DISTWIDTH] IN BLOOD BY AUTOMATED COUNT: 12.1 % (ref 11.5–14.5)
GFR SERPLBLD CREATININE-BSD FMLA CKD-EPI: 56 ML/MIN/1.73/M2
GLUCOSE SERPL-MCNC: 82 MG/DL (ref 70–110)
HCT VFR BLD AUTO: 39.5 % (ref 40–54)
HGB BLD-MCNC: 13 GM/DL (ref 14–18)
IMM GRANULOCYTES # BLD AUTO: 0.01 K/UL (ref 0–0.04)
IMM GRANULOCYTES NFR BLD AUTO: 0.3 % (ref 0–0.5)
LYMPHOCYTES # BLD AUTO: 1.52 K/UL (ref 1–4.8)
MCH RBC QN AUTO: 31.4 PG (ref 27–31)
MCHC RBC AUTO-ENTMCNC: 32.9 G/DL (ref 32–36)
MCV RBC AUTO: 95 FL (ref 82–98)
NUCLEATED RBC (/100WBC) (OHS): 0 /100 WBC
PLATELET # BLD AUTO: 210 K/UL (ref 150–450)
PMV BLD AUTO: 9.9 FL (ref 9.2–12.9)
POTASSIUM SERPL-SCNC: 4.1 MMOL/L (ref 3.5–5.1)
PROT SERPL-MCNC: 7.6 GM/DL (ref 6–8.4)
RBC # BLD AUTO: 4.14 M/UL (ref 4.6–6.2)
RELATIVE EOSINOPHIL (OHS): 3.2 %
RELATIVE LYMPHOCYTE (OHS): 43.9 % (ref 18–48)
RELATIVE MONOCYTE (OHS): 7.2 % (ref 4–15)
RELATIVE NEUTROPHIL (OHS): 44.5 % (ref 38–73)
SODIUM SERPL-SCNC: 139 MMOL/L (ref 136–145)
WBC # BLD AUTO: 3.46 K/UL (ref 3.9–12.7)

## 2025-06-18 PROCEDURE — 85025 COMPLETE CBC W/AUTO DIFF WBC: CPT

## 2025-06-18 PROCEDURE — 36415 COLL VENOUS BLD VENIPUNCTURE: CPT

## 2025-06-18 PROCEDURE — 99999 PR PBB SHADOW E&M-EST. PATIENT-LVL V: CPT | Mod: PBBFAC,,, | Performed by: SPECIALIST

## 2025-06-18 PROCEDURE — 80053 COMPREHEN METABOLIC PANEL: CPT

## 2025-06-18 NOTE — PROGRESS NOTES
I have been asked to see this delightful 64-year-old with low intermediate risk prostate cancer to discuss treatment options.  History of present illness:  Mr. Petty was found to have a PSA a of 9.95 on 04/02/2025.  MRI of the prostate performed 04/09/2025 demonstrated a 44 cc gland without any suspicious lesions.  Ultrasound biopsies were performed 05/13/2025 with 3 of 12 cores involved with [place]'s 3+4 disease.  He had 18% involvement of the left mid (1 of 2 cores 10% pattern 4) and 50% involvement of the left apex (2 of 2 cores 10% pattern 4).  He is currently being evaluated for a mixed urge/overflow incontinence which has not responded in the past to Flomax, VESIcare, Myrbetriq or Detrol.  He has progressed to wearing a depends for this.  His current AUA symptom score is 31 in his IIEF 5 score is 17.  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  He is scheduled for cystoscopy on the 20th and urodynamic testing on the 26th.  Past medical history: Recent diagnosis rhabdomyolysis based on  without other supporting lab  Gastroesophageal reflux disease asymptomatic  Obstructive sleep apnea longstanding intolerant of CPAP  Sjogren syndrome with SSA positivity and ILYA greater than 1 to 2560 speckled  Degenerative joint disease of cervical and lumbar spine  Past surgical history: Epidural steroids  Some type of lumbar spine surgery in past  Allergies: None   Habits: He has never smoked or drank  Family history: His mother had kidney cancer  Social: As mentioned he is a senior cleaned adviser for [employer]  Review of systems: Although he has serologic evidence of Sjogren's, he has no xerostomia, xerophthalmia or arthritis.  His ECOG performance status is 0  Physical exam: He is a well-developed well-nourished gentleman in no apparent distress.  He has no palpable supraclavicular adenopathy  On cardiovascular exam he has regular rhythm and rate  without murmur rub or gallop  His lungs are clear to auscultation  His abdomen is benign  On  exam he has a normal male with both testes descended without masses  Rectal exam is difficult and I can not reach the base of the prostate but the portion of the prostate I was able to reach felt normal  Impression: Mr. Petty has stage IIB (T1c N0 M0) adenocarcinoma of the prostate (Braxton's 3+4, PSA 9.95).  His progressive incontinence could be multifactorial in nature to include lumbosacral plexus disease either due to mechanical or autoimmune issues.  I agree with fully understanding the nature of his problem prior to recommending treatment.  I have informed him that his best treatment option would be to proceed with either prostatectomy or external beam radiotherapy.  I do not believe he is a candidate for permanent prostate brachytherapy.  Likewise I have recommended against cryotherapy, HIFU, partial prostate therapy or hormonal therapy.  At this point I would also like to send his tissue for Prolaris testing to get a better understanding of his genetics as we go through this process.  Plan:  I will send his tissue for Prolaris testing.  I will see him back in 1 month's time.  I certainly appreciate participating in this delightful gentleman's care.    50 minutes was spent reviewing images and records as well as face-to-face with the patient

## 2025-06-18 NOTE — TELEPHONE ENCOUNTER
Called patient and explained to him what the cystoscope and urodynamic study is. Patient expressed understanding. Patient confirmed appointment on Friday.        Copied from CRM #[id number]. Topic: General Inquiry - Patient Advice  >> Jun 18, 2025  2:35 PM Daisy wrote:  .Type: Patient Call Back    Who called: Patient     What is the request in detail: Patient is requesting a call back regarding his upcoming procedure scheduled on 6/26; pt is wanting to know if there was a sooner appt date and time.    Can the clinic reply by [facility]? NO    Would the patient rather a call back or a response via [facility]? Call    Best call back number: [phone]

## 2025-06-19 ENCOUNTER — TELEPHONE (OUTPATIENT)
Dept: UROLOGY | Facility: CLINIC | Age: 65
End: 2025-06-19
Payer: COMMERCIAL

## 2025-06-19 ENCOUNTER — TELEPHONE (OUTPATIENT)
Dept: INFECTIOUS DISEASES | Facility: CLINIC | Age: 65
End: 2025-06-19
Payer: COMMERCIAL

## 2025-06-19 NOTE — TELEPHONE ENCOUNTER
.Outgoing call placed to patient, patient verified name and date of birth,  patient requested another date for his Urodynamics f/u because he can't make that date/time, he was notified that the next available would be late July, early August if he reschedules, he requested a later time, notified 4 pm available, he requested to have that spot until he sees Dr. Abel on Friday to determine if the urodynamics and f/u are needed. Request done and no further assistance needed.       Copied from CRM #[id number]. Topic: Appointments - Appointment Access  >> Jun 19, 2025  8:31 AM Ginger wrote:  Type:  Needs Medical Advice    Who Called: pt  Symptoms (please be specific): na   How long has patient had these symptoms:  na  Pharmacy name and phone #:  na  Would the patient rather a call back or a response via [facility]? call  Best Call Back Number: [phone]  Additional Information: patient would like to discuss possibly getting an appt sooner or on a different date as he will be out of town for July 1st, can go to multiple clinics whichever is available.

## 2025-06-19 NOTE — TELEPHONE ENCOUNTER
Patient concerned about recent lab results. Patient states that he would like to be scheduled sooner virtually to see Dr. Mast. Advised patient that Dr. Mast is currently out on vacation and the current appt scheduled is his first available. Pt v/u and stated he will keep scheduled appt. Pt inquired if he can still receive hydration per orders placed by Dr. Mast in March. Advised patient that I will reach out to [facility] to see if orders can still be used. If orders can not be used, Dr. Mast will have to advise. Pt scheduled for HIV labs on tomorrow as labs were not drawn with CBC and CMP. Pt v/u.

## 2025-06-19 NOTE — TELEPHONE ENCOUNTER
Rtnd patient call. Pt not able to talk at the moment, requested a phone call later. Will return call later.    Copied from CRM #[id number]. Topic: Appointments - Appointment Access  >> Jun 19, 2025  8:33 AM Ginger wrote:  Type:  Sooner Apoointment Request    Caller is requesting a sooner appointment.  Caller declined first available appointment listed below.  Caller will not accept being placed on the waitlist and is requesting a message be sent to doctor.  Name of Caller:pt  When is the first available appointment?na  Symptoms:na  Would the patient rather a call back or a response via [facility]? call  Best Call Back Number:[phone]  Additional Information: requesting to get a sooner appt than 07/10, asap

## 2025-06-20 ENCOUNTER — TELEPHONE (OUTPATIENT)
Dept: UROLOGY | Facility: CLINIC | Age: 65
End: 2025-06-20
Payer: COMMERCIAL

## 2025-06-20 ENCOUNTER — PROCEDURE VISIT (OUTPATIENT)
Dept: UROLOGY | Facility: CLINIC | Age: 65
End: 2025-06-20
Payer: COMMERCIAL

## 2025-06-20 ENCOUNTER — HOSPITAL ENCOUNTER (OUTPATIENT)
Dept: RADIOLOGY | Facility: HOSPITAL | Age: 65
Discharge: HOME | End: 2025-06-20
Attending: UROLOGY
Payer: COMMERCIAL

## 2025-06-20 DIAGNOSIS — C61 MALIGNANT NEOPLASM OF PROSTATE: ICD-10-CM

## 2025-06-20 DIAGNOSIS — N39.41 URGE INCONTINENCE: Primary | ICD-10-CM

## 2025-06-20 DIAGNOSIS — N32.81 OAB (OVERACTIVE BLADDER): ICD-10-CM

## 2025-06-20 DIAGNOSIS — R39.15 URINARY URGENCY: ICD-10-CM

## 2025-06-20 PROCEDURE — 25500020 PHARM REV CODE 255: Performed by: UROLOGY

## 2025-06-20 PROCEDURE — 87086 URINE CULTURE/COLONY COUNT: CPT | Performed by: UROLOGY

## 2025-06-20 PROCEDURE — 74177 CT ABD & PELVIS W/CONTRAST: CPT | Mod: 26,,, | Performed by: RADIOLOGY

## 2025-06-20 PROCEDURE — 74177 CT ABD & PELVIS W/CONTRAST: CPT | Mod: TC

## 2025-06-20 RX ORDER — CIPROFLOXACIN 500 MG/1
500 TABLET, FILM COATED ORAL
Status: SHIPPED | OUTPATIENT
Start: 2025-06-20

## 2025-06-20 RX ORDER — LIDOCAINE HYDROCHLORIDE 20 MG/ML
JELLY TOPICAL
Status: SHIPPED | OUTPATIENT
Start: 2025-06-20

## 2025-06-20 RX ADMIN — IOHEXOL 100 ML: 350 INJECTION, SOLUTION INTRAVENOUS at 10:06

## 2025-06-20 NOTE — PROCEDURES
Cystoscopy    Date/Time: 6/20/2025 11:15 AM    Performed by: González Abel MD  Authorized by: González Abel MD    Consent Done?:  Yes (Written)  Timeout: prior to procedure the correct patient, procedure, and site was verified    Prep: patient was prepped and draped in usual sterile fashion    Anesthesia:  Intraurethral instillation  Local anesthetic:  Lidocaine 2% topical gel  Indications: overactive bladder    Position:  Dorsal lithotomy  Anesthesia:  Intraurethral instillation  Preparation: Patient was prepped and draped in usual sterile fashion    Scope type:  Flexible cystoscope  Comments:        After informed consent, and preoperative antibiotic positioned in supine position.  Genitalia prepped and draped sterilely.  A 17 [place] flexible cystoscope was passed through the urethra into the bladder.  Systematic examination revealed bladder petechiae.  Bilateral ureteral orifices were seen.  No urothelial lesion was seen.  Scope was retroflexed and mild BPH was noted.  Prostate were noted to be moderately enlarged with an approximate length of 4 cm. The scope was removed.  He tolerated procedure well.    Impression, bladder neck was tight however he does have significant petechiae through the bladder.  I think he could have interstitial cystitis.  Urodynamics will help us define his overactivity and urgency and level of obstruction.  We discussed options of treatment including prostatectomy followed by Botox and possible sphincter versus sling.  I do think his bladder is going to make incontinence much worse.  We also discussed the option of InterStim.  We also discussed radiation therapy.  He is getting a genetic test possible decipher done by Dr. Cramer.  This will also aid us in determining best course of action whether he chooses radiation or surgery.

## 2025-06-26 ENCOUNTER — TELEPHONE (OUTPATIENT)
Dept: PULMONOLOGY | Facility: CLINIC | Age: 65
End: 2025-06-26
Payer: COMMERCIAL

## 2025-06-26 NOTE — TELEPHONE ENCOUNTER
Office was informed by Yuly with [facility] that they have been trying to contact patient to schedule hydration therapy but have not been successful in scheduling patient. Patient states that he has not spoken with anyone from [facility]. Patient states that he will reach out to [facility] to schedule an appt. Yuly with [facility] informed.

## 2025-07-02 ENCOUNTER — LAB VISIT (OUTPATIENT)
Dept: LAB | Facility: HOSPITAL | Age: 65
End: 2025-07-02
Attending: INTERNAL MEDICINE
Payer: COMMERCIAL

## 2025-07-02 DIAGNOSIS — M15.0 PRIMARY OSTEOARTHRITIS INVOLVING MULTIPLE JOINTS: ICD-10-CM

## 2025-07-02 DIAGNOSIS — R74.8 ELEVATED CK: ICD-10-CM

## 2025-07-02 DIAGNOSIS — D84.821 IMMUNOSUPPRESSION DUE TO DRUG THERAPY: ICD-10-CM

## 2025-07-02 DIAGNOSIS — M35.05 SJOGREN SYNDROME WITH INFLAMMATORY ARTHRITIS: ICD-10-CM

## 2025-07-02 DIAGNOSIS — E55.9 VITAMIN D INSUFFICIENCY: ICD-10-CM

## 2025-07-02 DIAGNOSIS — Z51.81 MEDICATION MONITORING ENCOUNTER: ICD-10-CM

## 2025-07-02 DIAGNOSIS — M25.50 POLYARTHRALGIA: ICD-10-CM

## 2025-07-02 DIAGNOSIS — Z79.899 IMMUNOSUPPRESSION DUE TO DRUG THERAPY: ICD-10-CM

## 2025-07-02 LAB
25(OH)D3+25(OH)D2 SERPL-MCNC: 104 NG/ML (ref 30–96)
ABSOLUTE EOSINOPHIL (OHS): 0.08 K/UL
ABSOLUTE MONOCYTE (OHS): 0.3 K/UL (ref 0.3–1)
ABSOLUTE NEUTROPHIL COUNT (OHS): 1.64 K/UL (ref 1.8–7.7)
ALBUMIN SERPL BCP-MCNC: 3.8 G/DL (ref 3.5–5.2)
ALP SERPL-CCNC: 75 UNIT/L (ref 40–150)
ALT SERPL W/O P-5'-P-CCNC: 20 UNIT/L (ref 10–44)
ANION GAP (OHS): 7 MMOL/L (ref 8–16)
AST SERPL-CCNC: 24 UNIT/L (ref 11–45)
BASOPHILS # BLD AUTO: 0.02 K/UL
BASOPHILS NFR BLD AUTO: 0.5 %
BILIRUB SERPL-MCNC: 0.4 MG/DL (ref 0.1–1)
BUN SERPL-MCNC: 15 MG/DL (ref 8–23)
CALCIUM SERPL-MCNC: 9.5 MG/DL (ref 8.7–10.5)
CHLORIDE SERPL-SCNC: 103 MMOL/L (ref 95–110)
CK SERPL-CCNC: 208 U/L (ref 20–200)
CO2 SERPL-SCNC: 27 MMOL/L (ref 23–29)
CREAT SERPL-MCNC: 1.4 MG/DL (ref 0.5–1.4)
ERYTHROCYTE [DISTWIDTH] IN BLOOD BY AUTOMATED COUNT: 12.3 % (ref 11.5–14.5)
ERYTHROCYTE [SEDIMENTATION RATE] IN BLOOD: 11 MM/HR
GFR SERPLBLD CREATININE-BSD FMLA CKD-EPI: 56 ML/MIN/1.73/M2
GLUCOSE SERPL-MCNC: 96 MG/DL (ref 70–110)
HCT VFR BLD AUTO: 41.8 % (ref 40–54)
HGB BLD-MCNC: 14.1 GM/DL (ref 14–18)
IMM GRANULOCYTES # BLD AUTO: 0.01 K/UL (ref 0–0.04)
IMM GRANULOCYTES NFR BLD AUTO: 0.3 % (ref 0–0.5)
LYMPHOCYTES # BLD AUTO: 1.66 K/UL (ref 1–4.8)
MCH RBC QN AUTO: 32.5 PG (ref 27–31)
MCHC RBC AUTO-ENTMCNC: 33.7 G/DL (ref 32–36)
MCV RBC AUTO: 96 FL (ref 82–98)
NUCLEATED RBC (/100WBC) (OHS): 0 /100 WBC
PLATELET # BLD AUTO: 193 K/UL (ref 150–450)
PMV BLD AUTO: 9.1 FL (ref 9.2–12.9)
POTASSIUM SERPL-SCNC: 4.6 MMOL/L (ref 3.5–5.1)
PROT SERPL-MCNC: 8.1 GM/DL (ref 6–8.4)
RBC # BLD AUTO: 4.34 M/UL (ref 4.6–6.2)
RELATIVE EOSINOPHIL (OHS): 2.2 %
RELATIVE LYMPHOCYTE (OHS): 44.7 % (ref 18–48)
RELATIVE MONOCYTE (OHS): 8.1 % (ref 4–15)
RELATIVE NEUTROPHIL (OHS): 44.2 % (ref 38–73)
SODIUM SERPL-SCNC: 137 MMOL/L (ref 136–145)
URATE SERPL-MCNC: 7.1 MG/DL (ref 3.4–7)
WBC # BLD AUTO: 3.71 K/UL (ref 3.9–12.7)

## 2025-07-02 PROCEDURE — 36415 COLL VENOUS BLD VENIPUNCTURE: CPT

## 2025-07-02 PROCEDURE — 85025 COMPLETE CBC W/AUTO DIFF WBC: CPT

## 2025-07-02 PROCEDURE — 82550 ASSAY OF CK (CPK): CPT

## 2025-07-02 PROCEDURE — 80053 COMPREHEN METABOLIC PANEL: CPT

## 2025-07-02 PROCEDURE — 84550 ASSAY OF BLOOD/URIC ACID: CPT

## 2025-07-02 PROCEDURE — 82306 VITAMIN D 25 HYDROXY: CPT

## 2025-07-02 PROCEDURE — 85651 RBC SED RATE NONAUTOMATED: CPT

## 2025-07-03 ENCOUNTER — RESULTS FOLLOW-UP (OUTPATIENT)
Dept: RHEUMATOLOGY | Facility: CLINIC | Age: 65
End: 2025-07-03

## 2025-07-10 ENCOUNTER — OFFICE VISIT (OUTPATIENT)
Dept: INFECTIOUS DISEASES | Facility: CLINIC | Age: 65
End: 2025-07-10
Payer: COMMERCIAL

## 2025-07-10 DIAGNOSIS — Z79.899 ON PRE-EXPOSURE PROPHYLAXIS FOR HIV: Primary | ICD-10-CM

## 2025-07-11 NOTE — PROGRESS NOTES
"    History of Present Illness    Bradley presents today for follow up of Stage I cancer.    He reports recent diagnosis of Stage I cancer, currently under the [facility] of [facility] Dr. Cramer is not overly concerned about the cancer diagnosis. He has completed SGD scans and has an upcoming bone scan scheduled for next Wednesday at [facility]'s location.    He reports his prostate is pressing against his bladder. There have been discussions regarding potential prostate removal, however Dr. Cramer currently recommends monitoring without surgical intervention. He is accepting of this management approach.    He reports feeling "significantly dehydrated" with associated symptoms.          ROS       Physical Exam     Immunization History   Administered Date(s) Administered    COVID-19, MRNA, LN-S, PF (Pfizer) (Gray Cap) 06/22/2022    COVID-19, MRNA, LN-S, PF (Pfizer) (Purple Cap) 02/09/2021, 03/02/2021, 09/29/2021    COVID-19, mRNA, LNP-S, PF, elizabeth-sucrose, 30 mcg/0.3 mL (Pfizer Ages 12+) 10/30/2024    Influenza - Quadrivalent 10/22/2014    Influenza - Quadrivalent - PF *Preferred* (6 months and older) 10/08/2015, 11/08/2018, 10/05/2020, 10/13/2021, 11/09/2022    Influenza - Trivalent - Fluarix, Flulaval, Fluzone, Afluria - PF 10/30/2024    Tdap 10/22/2014    Zoster Recombinant 11/09/2022         Assessment:  Problem List[1]  Plan:  Problem List Items Addressed This Visit       On pre-exposure prophylaxis for HIV - Primary    Relevant Orders    HIV 1/2 Ag/Ab (4th Gen)    Comprehensive Metabolic Panel    CBC Auto Differential      The patient has been counseled regarding the importance of compliance with every dose of HIV medications. Possible side effects have been reviewed and the patients questions have been answered.    Assessment & Plan    MALIGNANT NEOPLASM OF PROSTATE:  - Acknowledged Stage I cancer diagnosis, but prioritized addressing more pressing health concerns.  - Reassured about cancer " prognosis, noting it was caught early and current control measures are adequate.    DISORDERS OF PROSTATE AND BLADDER:  - Deferred immediate action regarding prostate and bladder issues, opting for watchful waiting approach.            This note was generated with the assistance of ambient listening technology. Verbal consent was obtained by the patient and accompanying visitor(s) for the recording of patient appointment to facilitate this note. I attest to having reviewed and edited the generated note for accuracy, though some syntax or spelling errors may persist. Please contact the author of this note for any clarification.   The patient location is: [place]  The chief complaint leading to consultation is:     Visit type: audiovisual    Face to Face time with patient: 5  10  minutes of total time spent on the encounter, which includes face to face time and non-face to face time preparing to see the patient (eg, review of tests), Obtaining and/or reviewing separately obtained history, Documenting clinical information in the electronic or other health record, Independently interpreting results (not separately reported) and communicating results to the patient/family/caregiver, or Care coordination (not separately reported).         Each patient to whom he or she provides medical services by telemedicine is:  (1) informed of the relationship between the physician and patient and the respective role of any other health care provider with respect to management of the patient; and (2) notified that he or she may decline to receive medical services by telemedicine and may withdraw from such care at any time.    Notes:                                [1]   Patient Active Problem List  Diagnosis    Leukocytopenia, unspecified    Lumbago    Unspecified polyarthropathy or polyarthritis, site unspecified    Fibromyalgia    Depressed    Pain in left shoulder    Cervical radiculitis    Primary open-angle glaucoma(365.11)     Family history of glaucoma    Refractive error    Routine general medical examination at a health care facility    DA on CPAP    Obesity (BMI 30-39.9)    Postinflammatory hyperpigmentation    Hypersomnolence    On pre-exposure prophylaxis for HIV    Decreased ROM of lumbar spine    Weakness of both lower extremities    Sacroiliitis    Bilateral lumbar radiculopathy    Elevated serum creatinine    Severe obesity (BMI 35.0-39.9) with comorbidity    Irregular heart beat    Elevated blood pressure reading    Syncope and collapse    Dizziness    Prostate cancer

## 2025-07-11 NOTE — PROGRESS NOTES
"Subjective   THIS IS A TELE INFECTIOUS DISEASE VISIT  Location- [place]  Audio and video connection used  Time spent 15 minutes    Patient ID: Bradley Petty Jr. is a 64 y.o. male.    Chief Complaint: Follow up    HPIHistory of Present Illness    Bradley presents today for HIV pre op prophylasix.  He reports compliance with medications.      He reports recent diagnosis of Stage I cancer, currently under the care of [facility]. . He has completed SGD scans and has an upcoming bone scan scheduled for next Wednesday at [facility]'s location.    He reports his prostate is pressing against his bladder. There have been discussions regarding potential prostate removal, however Dr. Cramer currently recommends monitoring without surgical intervention. He is accepting of this management approach.    He reports feeling "significantly dehydrated" with associated symptoms.       Review of Systems   Constitutional:  Negative for activity change, appetite change, chills and diaphoresis.   Respiratory:  Negative for apnea.    Genitourinary:  Negative for bladder incontinence and difficulty urinating.          Objective     Physical Exam  Vitals and nursing note reviewed.   Constitutional:       Appearance: Normal appearance.   Cardiovascular:      Rate and Rhythm: Normal rate.   Musculoskeletal:         General: Normal range of motion.      Cervical back: Normal range of motion.   Neurological:      Mental Status: He is alert.            Assessment and Plan   Assessment & Plan    MALIGNANT NEOPLASM OF PROSTATE:  - Acknowledged Stage I cancer diagnosis, but prioritized addressing more pressing health concerns.  - Reassured about cancer prognosis, noting it was caught early and current control measures are adequate.    DISORDERS OF PROSTATE AND BLADDER:  - Deferred immediate action regarding prostate and bladder issues, opting for watchful waiting approach.       1. On pre-exposure prophylaxis for HIV    Will " continue Truvada -  HIV labs reviewed -  HIV assay is negative -06/20/25  CMP-06/02- serum creatinine- 1.4    -     HIV 1/2 Ag/Ab (4th Gen); Standing  -     Comprehensive Metabolic Panel; Standing  -     CBC Auto Differential; Standing        Will need to continue to monitor CMP

## 2025-07-14 ENCOUNTER — PATIENT MESSAGE (OUTPATIENT)
Dept: UROLOGY | Facility: CLINIC | Age: 65
End: 2025-07-14
Payer: COMMERCIAL

## 2025-07-23 ENCOUNTER — OFFICE VISIT (OUTPATIENT)
Dept: CARDIOLOGY | Facility: CLINIC | Age: 65
End: 2025-07-23
Payer: COMMERCIAL

## 2025-07-23 ENCOUNTER — HOSPITAL ENCOUNTER (OUTPATIENT)
Dept: RADIOLOGY | Facility: HOSPITAL | Age: 65
Discharge: HOME | End: 2025-07-23
Attending: UROLOGY
Payer: COMMERCIAL

## 2025-07-23 VITALS
HEART RATE: 88 BPM | BODY MASS INDEX: 33.2 KG/M2 | HEIGHT: 72 IN | OXYGEN SATURATION: 96 % | WEIGHT: 245.13 LBS | SYSTOLIC BLOOD PRESSURE: 122 MMHG | DIASTOLIC BLOOD PRESSURE: 84 MMHG

## 2025-07-23 DIAGNOSIS — R03.0 ELEVATED BLOOD PRESSURE READING: ICD-10-CM

## 2025-07-23 DIAGNOSIS — I49.9 IRREGULAR HEART BEAT: Primary | ICD-10-CM

## 2025-07-23 DIAGNOSIS — E66.9 OBESITY (BMI 30-39.9): ICD-10-CM

## 2025-07-23 DIAGNOSIS — C61 MALIGNANT NEOPLASM OF PROSTATE: ICD-10-CM

## 2025-07-23 DIAGNOSIS — G47.33 OSA ON CPAP: ICD-10-CM

## 2025-07-23 PROCEDURE — A9503 TC99M MEDRONATE: HCPCS | Performed by: UROLOGY

## 2025-07-23 PROCEDURE — 1159F MED LIST DOCD IN RCRD: CPT | Mod: CPTII,S$GLB,, | Performed by: INTERNAL MEDICINE

## 2025-07-23 PROCEDURE — 99214 OFFICE O/P EST MOD 30 MIN: CPT | Mod: S$GLB,,, | Performed by: INTERNAL MEDICINE

## 2025-07-23 PROCEDURE — 78306 BONE IMAGING WHOLE BODY: CPT | Mod: TC

## 2025-07-23 PROCEDURE — 3074F SYST BP LT 130 MM HG: CPT | Mod: CPTII,S$GLB,, | Performed by: INTERNAL MEDICINE

## 2025-07-23 PROCEDURE — 3008F BODY MASS INDEX DOCD: CPT | Mod: CPTII,S$GLB,, | Performed by: INTERNAL MEDICINE

## 2025-07-23 PROCEDURE — 3079F DIAST BP 80-89 MM HG: CPT | Mod: CPTII,S$GLB,, | Performed by: INTERNAL MEDICINE

## 2025-07-23 PROCEDURE — 78306 BONE IMAGING WHOLE BODY: CPT | Mod: 26,,, | Performed by: RADIOLOGY

## 2025-07-23 PROCEDURE — 99999 PR PBB SHADOW E&M-EST. PATIENT-LVL IV: CPT | Mod: PBBFAC,,, | Performed by: INTERNAL MEDICINE

## 2025-07-23 PROCEDURE — 1160F RVW MEDS BY RX/DR IN RCRD: CPT | Mod: CPTII,S$GLB,, | Performed by: INTERNAL MEDICINE

## 2025-07-23 RX ADMIN — TECHNETIUM TC 99M MEDRONATE 24.3 MILLICURIE: 20 INJECTION, POWDER, LYOPHILIZED, FOR SOLUTION INTRAVENOUS at 09:07

## 2025-07-23 NOTE — PROGRESS NOTES
Subjective:   Patient ID:  Bradley Petty Jr. is a 64 y.o. male who presents for follow-up of No chief complaint on file.  4-25  Pt dizziness , syncope ( 1 episode) with elevated BP. Pt did go to urgent care and ER. Pt with rare CP.  Dizziness 1.5 months. CP started 8 months ago     CRF- male, weight,lack of exercise    7-23-25  Echo nml  Nmt/stress nml  Cardiac monitor wnl  Pt with stress/anxiety at home and work  Pt seeing urology and oncologist    Dizziness:   Chronicity:  New  Onset:  More than 1 month ago  Progression since onset:  Waxing and waning  Frequency:  Every few days  Severity:  Moderate  Duration:  Very brief  Dizziness characteristics:  Off-balance  Frequency of Spells:  Weekly   Associated symptoms: light-headedness, syncope and chest pain.no palpitations.  Aggravated by:  Nothing  Treatments tried:  Rest  Improvements on treatment:  Mild  Loss of Consciousness  This is a new problem. The current episode started more than 1 month ago. The problem occurs rarely. The problem has been waxing and waning. He lost consciousness for a period of less than 1 minute. Associated symptoms include chest pain, dizziness and light-headedness. Pertinent negatives include no palpitations. He has tried nothing for the symptoms. The treatment provided mild relief.   Hypertension  This is a new problem. The current episode started more than 1 month ago. The problem has been gradually improving since onset. Associated symptoms include chest pain. Pertinent negatives include no palpitations or shortness of breath. There are no associated agents to hypertension. Risk factors for coronary artery disease include male gender and obesity. Past treatments include nothing. There are no compliance problems.  Identifiable causes of hypertension include sleep apnea.       Review of Systems   Constitutional: Negative. Negative for weight gain.   HENT: Negative.     Eyes: Negative.    Cardiovascular:  Positive for chest pain and  syncope. Negative for leg swelling and palpitations.   Respiratory: Negative.  Negative for shortness of breath.    Endocrine: Negative.    Hematologic/Lymphatic: Negative.    Skin: Negative.    Musculoskeletal:  Negative for muscle weakness.   Gastrointestinal: Negative.    Genitourinary: Negative.    Neurological:  Positive for dizziness and light-headedness.   Psychiatric/Behavioral: Negative.     Allergic/Immunologic: Negative.    All other systems reviewed and are negative.    Family History   Problem Relation Name Age of Onset    Diabetes Mellitus Maternal Grandmother Madear     Rheum arthritis Maternal Grandmother Madear     Cancer Maternal Grandmother Madear     Glaucoma Maternal Grandmother Madear     Kidney disease Maternal Grandmother Madear     Cancer Mother Mother         kidney left    Hearing loss Mother Mother     Melanoma Neg Hx      Psoriasis Neg Hx      Lupus Neg Hx      Acne Neg Hx       Past Medical History:   Diagnosis Date    Acid reflux     Arthralgia     CKD (chronic kidney disease)     Diabetes mellitus     Glaucoma     Hypertension     Sacroiliitis 08/29/2022    Sjogren's syndrome     Sleep apnea      Social History[1]  Medications Ordered Prior to Encounter[2]  Review of patient's allergies indicates:  No Known Allergies    Objective:     Physical Exam  Vitals and nursing note reviewed.   Constitutional:       Appearance: He is well-developed.   HENT:      Head: Normocephalic and atraumatic.   Eyes:      Conjunctiva/sclera: Conjunctivae normal.      Pupils: Pupils are equal, round, and reactive to light.   Cardiovascular:      Rate and Rhythm: Normal rate and regular rhythm.      Pulses: Intact distal pulses.      Heart sounds: Normal heart sounds.   Pulmonary:      Effort: Pulmonary effort is normal.      Breath sounds: Normal breath sounds.   Abdominal:      General: Bowel sounds are normal.      Palpations: Abdomen is soft.   Musculoskeletal:      Cervical back: Normal range of motion  and neck supple.   Skin:     General: Skin is warm and dry.   Neurological:      Mental Status: He is alert and oriented to person, place, and time.         Assessment:     1. Irregular heart beat    2. Elevated blood pressure reading    3. Obesity (BMI 30-39.9)    4. DA on CPAP        Plan:     Irregular heart beat    Elevated blood pressure reading    Obesity (BMI 30-39.9)    DA on CPAP        Exercise  Risk factor modification         [1]   Social History  Socioeconomic History    Marital status: Single   Occupational History     Employer: [employer]   Tobacco Use    Smoking status: Never    Smokeless tobacco: Never    Tobacco comments:     Never used   Substance and Sexual Activity    Alcohol use: Never    Drug use: Never    Sexual activity: Not Currently     Partners: Female     Birth control/protection: None     Social Drivers of Health     Financial Resource Strain: Low Risk  (3/19/2025)    Overall Financial Resource Strain (CARDIA)     Difficulty of Paying Living Expenses: Not hard at all   Food Insecurity: No Food Insecurity (3/19/2025)    Hunger Vital Sign     Worried About Running Out of Food in the Last Year: Never true     Ran Out of Food in the Last Year: Never true   Transportation Needs: No Transportation Needs (3/19/2025)    PRAPARE - Transportation     Lack of Transportation (Medical): No     Lack of Transportation (Non-Medical): No   Physical Activity: Sufficiently Active (3/19/2025)    Exercise Vital Sign     Days of Exercise per Week: 2 days     Minutes of Exercise per Session: 90 min   Stress: Stress Concern Present (3/19/2025)    [employer] - Occupational Stress Questionnaire     Feeling of Stress : Very much   Housing Stability: Low Risk  (3/19/2025)    Housing Stability Vital Sign     Unable to Pay for Housing in the Last Year: No     Homeless in the Last Year: No   [2]   Current Outpatient Medications on File Prior to Visit   Medication Sig Dispense Refill     acetaminophen (TYLENOL) 500 mg Cap Take 1,000 mg by mouth once daily.      azelastine (ASTELIN) 137 mcg (0.1 %) nasal spray 1 spray (137 mcg total) by Nasal route 2 (two) times daily. 30 mL 5    b complex vitamins tablet Take 1 tablet by mouth Daily.      cholecalciferol, vitamin D3, 5,000 unit capsule Take 5,000 Units by mouth Daily.      cyanocobalamin, vitamin B-12, 5,000 mcg TbDL Take 1 tablet by mouth once daily.      emtricitabine-tenofovir 200-300 mg (TRUVADA) 200-300 mg Tab TAKE 1 TABLET BY MOUTH EVERY DAY 30 tablet 4    folic acid (FOLVITE) 1 MG tablet TAKE 1 TABLET BY MOUTH EVERY DAY 90 tablet 1    HERBAL DRUGS (COLON HERBAL CLEANSER ORAL) Take by mouth once daily.       hydroquinone 4 % Crea Apply to affected areas of face daily for up to 2 months per course, then take at least 1 month off 30 g 1    meclizine (ANTIVERT) 25 mg tablet Take 1 tablet (25 mg total) by mouth 3 (three) times daily as needed for Dizziness. 18 tablet 0    mirabegron (MYRBETRIQ) 50 mg Tb24 Take 1 tablet (50 mg total) by mouth once daily. 30 tablet 11    multivitamin capsule Take 1 capsule by mouth Daily.      ondansetron (ZOFRAN-ODT) 4 MG TbDL Take 1 tablet (4 mg total) by mouth every 8 (eight) hours as needed (nausea). 15 tablet 0    solifenacin (VESICARE) 10 MG tablet TAKE 1 TABLET BY MOUTH EVERY DAY 30 tablet 11    tolterodine (DETROL LA) 4 MG 24 hr capsule Take 1 capsule (4 mg total) by mouth once daily. 30 capsule 11    traZODone (DESYREL) 50 MG tablet TAKE 1 TO 2 TABLETS BY MOUTH EVERY NIGHT AT BEDTIME AS NEEDED FOR SLEEP 180 tablet 3    levoFLOXacin (LEVAQUIN) 500 MG tablet Take 1 po 1 hour before biopsy 1 tablet 0    methotrexate 2.5 MG Tab Take 5 tablets (12.5 mg total) by mouth every 7 days. 20 tablet 3     Current Facility-Administered Medications on File Prior to Visit   Medication Dose Route Frequency Provider Last Rate Last Admin    ciprofloxacin HCl tablet 500 mg  500 mg Oral 1 time in Clinic/HOD         LIDOcaine  HCl 2% urojet   Mucous Membrane 1 time in Clinic/HOD

## 2025-07-26 DIAGNOSIS — Z79.899 ON PRE-EXPOSURE PROPHYLAXIS FOR HIV: ICD-10-CM

## 2025-07-28 ENCOUNTER — TELEPHONE (OUTPATIENT)
Dept: INFECTIOUS DISEASES | Facility: CLINIC | Age: 65
End: 2025-07-28
Payer: COMMERCIAL

## 2025-07-28 RX ORDER — EMTRICITABINE AND TENOFOVIR DISOPROXIL FUMARATE 200; 300 MG/1; MG/1
1 TABLET, FILM COATED ORAL
Qty: 30 TABLET | Refills: 4 | Status: SHIPPED | OUTPATIENT
Start: 2025-07-28

## 2025-07-28 NOTE — TELEPHONE ENCOUNTER
Copied from CRM #[id number]. Topic: Medications - Medication Refill  >> Jul 28, 2025  7:34 AM Miranda wrote:  Type:  RX Refill Request    Who Called: Bradley Petty Jr.  Refill or New Rx:refill  RX Name and Strength:emtricitabine-tenofovir 200-300 mg (TRUVADA) 200-300 mg Tab  How is the patient currently taking it? (ex. 1XDay):  Is this a 30 day or 90 day RX:.  Preferred Pharmacy with phone number:   [facility]/pharmacy #[id number] - [place] - [address]  [address]  [place] [address]  Phone: [phone] Fax: [phone]  Local or Mail Order: local  Ordering Provider:Pro  Would the patient rather a call back or a response via [facility]? Call back  Best Call Back Number:[phone]  Additional Information: the patient will be going out of town for 2 weeks and was calling to see if the prescription can be called in today. He states the pharmacy reached out to try and get it filled.

## 2025-08-06 ENCOUNTER — OFFICE VISIT (OUTPATIENT)
Dept: RADIATION ONCOLOGY | Facility: CLINIC | Age: 65
End: 2025-08-06
Payer: COMMERCIAL

## 2025-08-06 DIAGNOSIS — C61 PROSTATE CANCER: Primary | ICD-10-CM

## 2025-08-06 NOTE — PROGRESS NOTES
The patient location is:  [place]  The chief complaint leading to consultation is:  Prostate cancer    Visit type: audiovisual    Face to Face time with patient:  8 minutes  15  minutes of total time spent on the encounter, which includes face to face time and non-face to face time preparing to see the patient (eg, review of tests), Obtaining and/or reviewing separately obtained history, Documenting clinical information in the electronic or other health record, Independently interpreting results (not separately reported) and communicating results to the patient/family/caregiver, or Care coordination (not separately reported).         Each patient to whom he or she provides medical services by telemedicine is:  (1) informed of the relationship between the physician and patient and the respective role of any other health care provider with respect to management of the patient; and (2) notified that he or she may decline to receive medical services by telemedicine and may withdraw from such care at any time.    Notes:  Mr. Petty connected via video conference.  We could both see and hear each other through the course of the discussion.  We summarized his current information which includes his Prolaris score coming back low risk and even suggesting observation.  He has seen Dr. Abel who has performed cystoscopy in his questioning whether or not he has interstitial cystitis.  He is scheduled for urodynamics next month.  I have assured Mr. Petty that he has low risk disease but with pattern 4 disease I would air towards the site of treatment but would not recommend treatment until we had completely sorted his bladder out so we can understand the risk of radiation worsening potential interstitial cystitis or his interstitial cystitis worsening his incontinence outcome with surgery which currently is low.  Plan: We will meet again in 1 month.  I certainly appreciate participating in this delightful gentleman's  care.

## 2025-08-19 ENCOUNTER — TELEPHONE (OUTPATIENT)
Dept: UROLOGY | Facility: CLINIC | Age: 65
End: 2025-08-19
Payer: COMMERCIAL

## 2025-08-20 DIAGNOSIS — M35.05 SJOGREN SYNDROME WITH INFLAMMATORY ARTHRITIS: ICD-10-CM

## 2025-08-20 RX ORDER — METHOTREXATE 2.5 MG/1
12.5 TABLET ORAL
Qty: 20 TABLET | Refills: 3 | Status: SHIPPED | OUTPATIENT
Start: 2025-08-20 | End: 2025-09-19

## 2025-09-03 ENCOUNTER — OFFICE VISIT (OUTPATIENT)
Dept: PULMONOLOGY | Facility: CLINIC | Age: 65
End: 2025-09-03
Payer: COMMERCIAL

## 2025-09-03 ENCOUNTER — TELEPHONE (OUTPATIENT)
Dept: PULMONOLOGY | Facility: CLINIC | Age: 65
End: 2025-09-03
Payer: COMMERCIAL

## 2025-09-03 VITALS — WEIGHT: 240 LBS | HEIGHT: 72 IN | BODY MASS INDEX: 32.51 KG/M2

## 2025-09-03 DIAGNOSIS — G47.33 OSA ON CPAP: ICD-10-CM

## 2025-09-03 DIAGNOSIS — G47.33 OSA (OBSTRUCTIVE SLEEP APNEA): Primary | ICD-10-CM

## 2025-09-03 DIAGNOSIS — E66.9 OBESITY (BMI 30-39.9): ICD-10-CM

## 2025-09-03 PROCEDURE — 1160F RVW MEDS BY RX/DR IN RCRD: CPT | Mod: CPTII,95,, | Performed by: INTERNAL MEDICINE

## 2025-09-03 PROCEDURE — 98002 SYNCH AUDIO-VIDEO NEW MOD 45: CPT | Mod: 95,,, | Performed by: INTERNAL MEDICINE

## 2025-09-03 PROCEDURE — 1159F MED LIST DOCD IN RCRD: CPT | Mod: CPTII,95,, | Performed by: INTERNAL MEDICINE

## 2025-09-05 ENCOUNTER — OFFICE VISIT (OUTPATIENT)
Dept: RADIATION ONCOLOGY | Facility: CLINIC | Age: 65
End: 2025-09-05
Payer: COMMERCIAL

## 2025-09-05 DIAGNOSIS — C61 PROSTATE CANCER: Primary | ICD-10-CM
